# Patient Record
Sex: MALE | Race: WHITE | NOT HISPANIC OR LATINO | Employment: OTHER | URBAN - METROPOLITAN AREA
[De-identification: names, ages, dates, MRNs, and addresses within clinical notes are randomized per-mention and may not be internally consistent; named-entity substitution may affect disease eponyms.]

---

## 2020-08-17 ENCOUNTER — NURSING HOME VISIT (OUTPATIENT)
Dept: FAMILY MEDICINE CLINIC | Facility: CLINIC | Age: 70
End: 2020-08-17
Payer: COMMERCIAL

## 2020-08-17 VITALS
TEMPERATURE: 98.2 F | OXYGEN SATURATION: 90 % | WEIGHT: 136.9 LBS | SYSTOLIC BLOOD PRESSURE: 122 MMHG | HEART RATE: 67 BPM | RESPIRATION RATE: 16 BRPM | DIASTOLIC BLOOD PRESSURE: 80 MMHG

## 2020-08-17 DIAGNOSIS — I63.9 CEREBROVASCULAR ACCIDENT (CVA), UNSPECIFIED MECHANISM (HCC): ICD-10-CM

## 2020-08-17 DIAGNOSIS — I10 ESSENTIAL HYPERTENSION: ICD-10-CM

## 2020-08-17 DIAGNOSIS — G47.09 OTHER INSOMNIA: ICD-10-CM

## 2020-08-17 DIAGNOSIS — E78.49 OTHER HYPERLIPIDEMIA: Primary | ICD-10-CM

## 2020-08-17 PROCEDURE — 99305 1ST NF CARE MODERATE MDM 35: CPT | Performed by: INTERNAL MEDICINE

## 2020-08-17 NOTE — ASSESSMENT & PLAN NOTE
Patient was initially hospitalized on 07/21 with confusion  He was noted to have left-sided neglect on physical exam   MRI/MRA of the head and neck revealed acute to early subacute infarction involving the right posterior cerebral artery territory along with multiple old lacunar infarction and microvascular ischemic changes  Patient was evaluated by neurology service    Continue aspirin and atorvastatin
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
## 2020-08-17 NOTE — ASSESSMENT & PLAN NOTE
Continue trazodone and melatonin    Patient was previously on Remeron and Ambien which were discontinued during recent hospitalization

## 2020-08-17 NOTE — ASSESSMENT & PLAN NOTE
Blood pressure remains stable of antihypertensives    Patient was previously on lisinopril/ HCTZ which was discontinued at the hospital

## 2020-08-21 PROBLEM — I63.9 CVA (CEREBRAL VASCULAR ACCIDENT) (HCC): Status: RESOLVED | Noted: 2020-08-17 | Resolved: 2020-08-21

## 2020-08-21 PROBLEM — Z29.9 DVT PROPHYLAXIS: Status: ACTIVE | Noted: 2020-08-21

## 2020-08-21 PROBLEM — K59.01 SLOW TRANSIT CONSTIPATION: Status: ACTIVE | Noted: 2020-08-21

## 2020-08-21 PROBLEM — G62.9 PERIPHERAL NEUROPATHY: Status: ACTIVE | Noted: 2020-08-21

## 2020-12-10 ENCOUNTER — APPOINTMENT (EMERGENCY)
Dept: RADIOLOGY | Facility: HOSPITAL | Age: 70
DRG: 177 | End: 2020-12-10
Payer: COMMERCIAL

## 2020-12-10 ENCOUNTER — HOSPITAL ENCOUNTER (INPATIENT)
Facility: HOSPITAL | Age: 70
LOS: 21 days | Discharge: NURSING, REHAB OR LONG-TERM CARE | DRG: 177 | End: 2020-12-31
Attending: EMERGENCY MEDICINE | Admitting: ANESTHESIOLOGY
Payer: COMMERCIAL

## 2020-12-10 DIAGNOSIS — J96.01 ACUTE RESPIRATORY FAILURE WITH HYPOXIA (HCC): ICD-10-CM

## 2020-12-10 DIAGNOSIS — G93.41 ACUTE METABOLIC ENCEPHALOPATHY: ICD-10-CM

## 2020-12-10 DIAGNOSIS — J44.9 CHRONIC OBSTRUCTIVE PULMONARY DISEASE, UNSPECIFIED COPD TYPE (HCC): ICD-10-CM

## 2020-12-10 DIAGNOSIS — U07.1 PNEUMONIA DUE TO COVID-19 VIRUS: ICD-10-CM

## 2020-12-10 DIAGNOSIS — U07.1 COVID-19 VIRUS INFECTION: ICD-10-CM

## 2020-12-10 DIAGNOSIS — I63.9 CEREBROVASCULAR ACCIDENT (CVA), UNSPECIFIED MECHANISM (HCC): ICD-10-CM

## 2020-12-10 DIAGNOSIS — A41.9 SEPSIS, DUE TO UNSPECIFIED ORGANISM, UNSPECIFIED WHETHER ACUTE ORGAN DYSFUNCTION PRESENT (HCC): Primary | ICD-10-CM

## 2020-12-10 DIAGNOSIS — R09.02 HYPOXIA: ICD-10-CM

## 2020-12-10 DIAGNOSIS — J12.82 PNEUMONIA DUE TO COVID-19 VIRUS: ICD-10-CM

## 2020-12-10 DIAGNOSIS — I48.91 NEW ONSET ATRIAL FIBRILLATION (HCC): ICD-10-CM

## 2020-12-10 DIAGNOSIS — S06.5X9A SUBDURAL HEMATOMA (HCC): ICD-10-CM

## 2020-12-10 LAB
ABO GROUP BLD: NORMAL
ALBUMIN SERPL BCP-MCNC: 2.5 G/DL (ref 3.5–5)
ALP SERPL-CCNC: 77 U/L (ref 46–116)
ALT SERPL W P-5'-P-CCNC: 27 U/L (ref 12–78)
ANION GAP SERPL CALCULATED.3IONS-SCNC: 9 MMOL/L (ref 4–13)
APTT PPP: 33 SECONDS (ref 23–37)
AST SERPL W P-5'-P-CCNC: 58 U/L (ref 5–45)
ATRIAL RATE: 86 BPM
BASOPHILS # BLD AUTO: 0 THOUSANDS/ΜL (ref 0–0.1)
BASOPHILS NFR BLD AUTO: 0 % (ref 0–1)
BILIRUB SERPL-MCNC: 0.5 MG/DL (ref 0.2–1)
BUN SERPL-MCNC: 41 MG/DL (ref 5–25)
CALCIUM ALBUM COR SERPL-MCNC: 9.2 MG/DL (ref 8.3–10.1)
CALCIUM SERPL-MCNC: 8 MG/DL (ref 8.3–10.1)
CHLORIDE SERPL-SCNC: 105 MMOL/L (ref 100–108)
CO2 SERPL-SCNC: 22 MMOL/L (ref 21–32)
CREAT SERPL-MCNC: 1.41 MG/DL (ref 0.6–1.3)
CRP SERPL QL: 229.7 MG/L
D DIMER PPP FEU-MCNC: 1.99 UG/ML FEU
EOSINOPHIL # BLD AUTO: 0 THOUSAND/ΜL (ref 0–0.61)
EOSINOPHIL NFR BLD AUTO: 0 % (ref 0–6)
ERYTHROCYTE [DISTWIDTH] IN BLOOD BY AUTOMATED COUNT: 16.3 % (ref 11.6–15.1)
FERRITIN SERPL-MCNC: 1801 NG/ML (ref 8–388)
FLUAV RNA RESP QL NAA+PROBE: NEGATIVE
FLUBV RNA RESP QL NAA+PROBE: NEGATIVE
GFR SERPL CREATININE-BSD FRML MDRD: 49 ML/MIN/1.73SQ M
GLUCOSE SERPL-MCNC: 174 MG/DL (ref 65–140)
HCT VFR BLD AUTO: 30.2 % (ref 36.5–49.3)
HGB BLD-MCNC: 10.3 G/DL (ref 12–17)
IMM GRANULOCYTES # BLD AUTO: 0.02 THOUSAND/UL (ref 0–0.2)
IMM GRANULOCYTES NFR BLD AUTO: 1 % (ref 0–2)
INR PPP: 0.98 (ref 0.84–1.19)
LACTATE SERPL-SCNC: 1.6 MMOL/L (ref 0.5–2)
LYMPHOCYTES # BLD AUTO: 0.42 THOUSANDS/ΜL (ref 0.6–4.47)
LYMPHOCYTES NFR BLD AUTO: 14 % (ref 14–44)
MAGNESIUM SERPL-MCNC: 2.1 MG/DL (ref 1.6–2.6)
MCH RBC QN AUTO: 30.8 PG (ref 26.8–34.3)
MCHC RBC AUTO-ENTMCNC: 34.1 G/DL (ref 31.4–37.4)
MCV RBC AUTO: 90 FL (ref 82–98)
MONOCYTES # BLD AUTO: 0.23 THOUSAND/ΜL (ref 0.17–1.22)
MONOCYTES NFR BLD AUTO: 8 % (ref 4–12)
NEUTROPHILS # BLD AUTO: 2.37 THOUSANDS/ΜL (ref 1.85–7.62)
NEUTS SEG NFR BLD AUTO: 77 % (ref 43–75)
NRBC BLD AUTO-RTO: 0 /100 WBCS
NT-PROBNP SERPL-MCNC: 442 PG/ML
P AXIS: 38 DEGREES
PLATELET # BLD AUTO: 131 THOUSANDS/UL (ref 149–390)
PMV BLD AUTO: 10 FL (ref 8.9–12.7)
POTASSIUM SERPL-SCNC: 4.3 MMOL/L (ref 3.5–5.3)
PR INTERVAL: 142 MS
PROCALCITONIN SERPL-MCNC: 0.05 NG/ML
PROCALCITONIN SERPL-MCNC: 0.18 NG/ML
PROT SERPL-MCNC: 6.7 G/DL (ref 6.4–8.2)
PROTHROMBIN TIME: 13.2 SECONDS (ref 11.6–14.5)
QRS AXIS: 22 DEGREES
QRSD INTERVAL: 76 MS
QT INTERVAL: 392 MS
QTC INTERVAL: 469 MS
RBC # BLD AUTO: 3.34 MILLION/UL (ref 3.88–5.62)
RH BLD: POSITIVE
RSV RNA RESP QL NAA+PROBE: NEGATIVE
SARS-COV-2 RNA RESP QL NAA+PROBE: POSITIVE
SODIUM SERPL-SCNC: 136 MMOL/L (ref 136–145)
T WAVE AXIS: 29 DEGREES
TROPONIN I SERPL-MCNC: 0.02 NG/ML
VENTRICULAR RATE: 86 BPM
WBC # BLD AUTO: 3.04 THOUSAND/UL (ref 4.31–10.16)

## 2020-12-10 PROCEDURE — 83735 ASSAY OF MAGNESIUM: CPT | Performed by: EMERGENCY MEDICINE

## 2020-12-10 PROCEDURE — 84484 ASSAY OF TROPONIN QUANT: CPT | Performed by: EMERGENCY MEDICINE

## 2020-12-10 PROCEDURE — 85379 FIBRIN DEGRADATION QUANT: CPT | Performed by: EMERGENCY MEDICINE

## 2020-12-10 PROCEDURE — 80053 COMPREHEN METABOLIC PANEL: CPT | Performed by: EMERGENCY MEDICINE

## 2020-12-10 PROCEDURE — 96374 THER/PROPH/DIAG INJ IV PUSH: CPT

## 2020-12-10 PROCEDURE — 85610 PROTHROMBIN TIME: CPT | Performed by: EMERGENCY MEDICINE

## 2020-12-10 PROCEDURE — 87040 BLOOD CULTURE FOR BACTERIA: CPT | Performed by: EMERGENCY MEDICINE

## 2020-12-10 PROCEDURE — 86901 BLOOD TYPING SEROLOGIC RH(D): CPT | Performed by: INTERNAL MEDICINE

## 2020-12-10 PROCEDURE — 85025 COMPLETE CBC W/AUTO DIFF WBC: CPT | Performed by: EMERGENCY MEDICINE

## 2020-12-10 PROCEDURE — 83605 ASSAY OF LACTIC ACID: CPT | Performed by: EMERGENCY MEDICINE

## 2020-12-10 PROCEDURE — 85730 THROMBOPLASTIN TIME PARTIAL: CPT | Performed by: EMERGENCY MEDICINE

## 2020-12-10 PROCEDURE — 99223 1ST HOSP IP/OBS HIGH 75: CPT | Performed by: INTERNAL MEDICINE

## 2020-12-10 PROCEDURE — 86140 C-REACTIVE PROTEIN: CPT | Performed by: INTERNAL MEDICINE

## 2020-12-10 PROCEDURE — 99285 EMERGENCY DEPT VISIT HI MDM: CPT

## 2020-12-10 PROCEDURE — 84145 PROCALCITONIN (PCT): CPT | Performed by: INTERNAL MEDICINE

## 2020-12-10 PROCEDURE — 99285 EMERGENCY DEPT VISIT HI MDM: CPT | Performed by: EMERGENCY MEDICINE

## 2020-12-10 PROCEDURE — 93005 ELECTROCARDIOGRAM TRACING: CPT

## 2020-12-10 PROCEDURE — 84145 PROCALCITONIN (PCT): CPT | Performed by: EMERGENCY MEDICINE

## 2020-12-10 PROCEDURE — 83520 IMMUNOASSAY QUANT NOS NONAB: CPT | Performed by: INTERNAL MEDICINE

## 2020-12-10 PROCEDURE — 71045 X-RAY EXAM CHEST 1 VIEW: CPT

## 2020-12-10 PROCEDURE — 86900 BLOOD TYPING SEROLOGIC ABO: CPT | Performed by: INTERNAL MEDICINE

## 2020-12-10 PROCEDURE — 0241U HB NFCT DS VIR RESP RNA 4 TRGT: CPT | Performed by: EMERGENCY MEDICINE

## 2020-12-10 PROCEDURE — XW033E5 INTRODUCTION OF REMDESIVIR ANTI-INFECTIVE INTO PERIPHERAL VEIN, PERCUTANEOUS APPROACH, NEW TECHNOLOGY GROUP 5: ICD-10-PCS | Performed by: INTERNAL MEDICINE

## 2020-12-10 PROCEDURE — 93010 ELECTROCARDIOGRAM REPORT: CPT | Performed by: INTERNAL MEDICINE

## 2020-12-10 PROCEDURE — 83880 ASSAY OF NATRIURETIC PEPTIDE: CPT | Performed by: EMERGENCY MEDICINE

## 2020-12-10 PROCEDURE — 96360 HYDRATION IV INFUSION INIT: CPT

## 2020-12-10 PROCEDURE — 82728 ASSAY OF FERRITIN: CPT | Performed by: INTERNAL MEDICINE

## 2020-12-10 PROCEDURE — 36415 COLL VENOUS BLD VENIPUNCTURE: CPT | Performed by: EMERGENCY MEDICINE

## 2020-12-10 RX ORDER — DOXYCYCLINE HYCLATE 100 MG/1
100 CAPSULE ORAL EVERY 12 HOURS SCHEDULED
Status: DISCONTINUED | OUTPATIENT
Start: 2020-12-10 | End: 2020-12-11

## 2020-12-10 RX ORDER — FAMOTIDINE 20 MG/1
20 TABLET, FILM COATED ORAL DAILY
Status: DISCONTINUED | OUTPATIENT
Start: 2020-12-10 | End: 2020-12-22

## 2020-12-10 RX ORDER — HEPARIN SODIUM 5000 [USP'U]/ML
5000 INJECTION, SOLUTION INTRAVENOUS; SUBCUTANEOUS EVERY 8 HOURS SCHEDULED
Status: DISCONTINUED | OUTPATIENT
Start: 2020-12-10 | End: 2020-12-15

## 2020-12-10 RX ORDER — FAMOTIDINE 20 MG/1
20 TABLET, FILM COATED ORAL 2 TIMES DAILY
Status: DISCONTINUED | OUTPATIENT
Start: 2020-12-10 | End: 2020-12-10

## 2020-12-10 RX ORDER — MELATONIN
2000 DAILY
Status: DISCONTINUED | OUTPATIENT
Start: 2020-12-11 | End: 2020-12-22

## 2020-12-10 RX ORDER — ATORVASTATIN CALCIUM 40 MG/1
40 TABLET, FILM COATED ORAL
Status: DISCONTINUED | OUTPATIENT
Start: 2020-12-10 | End: 2020-12-22

## 2020-12-10 RX ORDER — DEXAMETHASONE SODIUM PHOSPHATE 4 MG/ML
6 INJECTION, SOLUTION INTRA-ARTICULAR; INTRALESIONAL; INTRAMUSCULAR; INTRAVENOUS; SOFT TISSUE EVERY 24 HOURS
Status: COMPLETED | OUTPATIENT
Start: 2020-12-10 | End: 2020-12-19

## 2020-12-10 RX ORDER — MULTIVITAMIN/IRON/FOLIC ACID 18MG-0.4MG
1 TABLET ORAL DAILY
Status: DISCONTINUED | OUTPATIENT
Start: 2020-12-18 | End: 2020-12-22

## 2020-12-10 RX ORDER — VANCOMYCIN HYDROCHLORIDE 1 G/200ML
15 INJECTION, SOLUTION INTRAVENOUS ONCE
Status: COMPLETED | OUTPATIENT
Start: 2020-12-10 | End: 2020-12-10

## 2020-12-10 RX ORDER — ZINC SULFATE 50(220)MG
220 CAPSULE ORAL DAILY
Status: COMPLETED | OUTPATIENT
Start: 2020-12-11 | End: 2020-12-17

## 2020-12-10 RX ORDER — LANOLIN ALCOHOL/MO/W.PET/CERES
3 CREAM (GRAM) TOPICAL
Status: DISCONTINUED | OUTPATIENT
Start: 2020-12-10 | End: 2020-12-22

## 2020-12-10 RX ORDER — ACETAMINOPHEN 325 MG/1
650 TABLET ORAL EVERY 6 HOURS PRN
Status: DISCONTINUED | OUTPATIENT
Start: 2020-12-10 | End: 2020-12-10 | Stop reason: SDUPTHER

## 2020-12-10 RX ORDER — ONDANSETRON 2 MG/ML
4 INJECTION INTRAMUSCULAR; INTRAVENOUS EVERY 6 HOURS PRN
Status: DISCONTINUED | OUTPATIENT
Start: 2020-12-10 | End: 2020-12-24

## 2020-12-10 RX ORDER — SODIUM CHLORIDE, SODIUM LACTATE, POTASSIUM CHLORIDE, CALCIUM CHLORIDE 600; 310; 30; 20 MG/100ML; MG/100ML; MG/100ML; MG/100ML
75 INJECTION, SOLUTION INTRAVENOUS CONTINUOUS
Status: DISCONTINUED | OUTPATIENT
Start: 2020-12-10 | End: 2020-12-11

## 2020-12-10 RX ORDER — ASCORBIC ACID 500 MG
1000 TABLET ORAL EVERY 12 HOURS SCHEDULED
Status: COMPLETED | OUTPATIENT
Start: 2020-12-10 | End: 2020-12-17

## 2020-12-10 RX ORDER — BISACODYL 10 MG
10 SUPPOSITORY, RECTAL RECTAL DAILY
Status: DISCONTINUED | OUTPATIENT
Start: 2020-12-11 | End: 2020-12-22

## 2020-12-10 RX ORDER — ACETAMINOPHEN 325 MG/1
650 TABLET ORAL EVERY 6 HOURS PRN
Status: DISCONTINUED | OUTPATIENT
Start: 2020-12-10 | End: 2020-12-22

## 2020-12-10 RX ORDER — NICOTINE 21 MG/24HR
1 PATCH, TRANSDERMAL 24 HOURS TRANSDERMAL DAILY
Status: DISCONTINUED | OUTPATIENT
Start: 2020-12-11 | End: 2020-12-20

## 2020-12-10 RX ORDER — GABAPENTIN 300 MG/1
600 CAPSULE ORAL 3 TIMES DAILY
Status: DISCONTINUED | OUTPATIENT
Start: 2020-12-10 | End: 2020-12-22

## 2020-12-10 RX ORDER — TRAZODONE HYDROCHLORIDE 50 MG/1
25 TABLET ORAL
Status: DISCONTINUED | OUTPATIENT
Start: 2020-12-10 | End: 2020-12-22

## 2020-12-10 RX ORDER — ASPIRIN 325 MG
325 TABLET ORAL DAILY
Status: DISCONTINUED | OUTPATIENT
Start: 2020-12-11 | End: 2020-12-16

## 2020-12-10 RX ADMIN — SODIUM CHLORIDE 1000 ML: 0.9 INJECTION, SOLUTION INTRAVENOUS at 13:31

## 2020-12-10 RX ADMIN — CEFEPIME HYDROCHLORIDE 2000 MG: 2 INJECTION, POWDER, FOR SOLUTION INTRAVENOUS at 14:05

## 2020-12-10 RX ADMIN — DOXYCYCLINE 100 MG: 100 CAPSULE ORAL at 20:14

## 2020-12-10 RX ADMIN — ATORVASTATIN CALCIUM 40 MG: 40 TABLET, FILM COATED ORAL at 22:50

## 2020-12-10 RX ADMIN — MELATONIN 3 MG: at 22:51

## 2020-12-10 RX ADMIN — VANCOMYCIN HYDROCHLORIDE 1000 MG: 1 INJECTION, SOLUTION INTRAVENOUS at 15:27

## 2020-12-10 RX ADMIN — GABAPENTIN 600 MG: 300 CAPSULE ORAL at 20:15

## 2020-12-10 RX ADMIN — FAMOTIDINE 20 MG: 20 TABLET ORAL at 22:51

## 2020-12-10 RX ADMIN — DEXAMETHASONE SODIUM PHOSPHATE 6 MG: 4 INJECTION, SOLUTION INTRAMUSCULAR; INTRAVENOUS at 20:15

## 2020-12-10 RX ADMIN — SODIUM CHLORIDE, SODIUM LACTATE, POTASSIUM CHLORIDE, AND CALCIUM CHLORIDE 75 ML/HR: .6; .31; .03; .02 INJECTION, SOLUTION INTRAVENOUS at 20:11

## 2020-12-10 RX ADMIN — CEFTRIAXONE SODIUM 1000 MG: 10 INJECTION, POWDER, FOR SOLUTION INTRAVENOUS at 20:16

## 2020-12-10 RX ADMIN — TRAZODONE HYDROCHLORIDE 25 MG: 50 TABLET ORAL at 22:50

## 2020-12-10 RX ADMIN — HEPARIN SODIUM 5000 UNITS: 5000 INJECTION INTRAVENOUS; SUBCUTANEOUS at 22:51

## 2020-12-10 RX ADMIN — REMDESIVIR 200 MG: 100 INJECTION, POWDER, LYOPHILIZED, FOR SOLUTION INTRAVENOUS at 22:45

## 2020-12-10 RX ADMIN — OXYCODONE HYDROCHLORIDE AND ACETAMINOPHEN 1000 MG: 500 TABLET ORAL at 20:14

## 2020-12-10 NOTE — ED NOTES
Patient informed about urine sample collection  Patient unable to use the restroom at this time       Beranrd Landis  12/10/20 5745
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
## 2020-12-10 NOTE — ASSESSMENT & PLAN NOTE
Will admit patient on moderate COVID-19 protocol as he is on 5 L of oxygen satting 92%   IV remdesivir, IV Decadron, multivitamin  IV Rocephin, doxycycline  DVT prophylaxis  Will order all COVID labs and trend     Discussed with patient's niece over the phone obtain consent COVID-19 plasma after explaining it as EUA  Convalescent Plasma was ordered on 12/10/2020  The Fact Sheet for Patients and Parents/Caregivers was provided to the patient and/or healthcare agent  The option to accept or refuse administration was offered  The risks, benefits, and alternatives to treatment were reviewed, and all questions addressed  Disclosure that this treatment is authorized for use under EUA and not FDA approved for treatment was discussed       Will consult pulmonology, recommendations would be greatly appreciated

## 2020-12-10 NOTE — ASSESSMENT & PLAN NOTE
Patient currently on 5 L of oxygen via nasal cannula satting 92%  Continue oxygen supplementation  If patient needs increased oxygen requirement insert med floor and consult ICU

## 2020-12-10 NOTE — H&P
H&P- Syliva Arrow 1950, 65 y o  male MRN: [id number]    Unit/Bed#: ED 21 Encounter: [id number]    Primary Care Provider: Cari Reagan MD   Date and time admitted to hospital: 12/10/2020 12:59 PM    * COVID-19 virus infection  Assessment & Plan  Will admit patient on moderate COVID-19 protocol as he is on 5 L of oxygen satting 92%   IV remdesivir, IV Decadron, multivitamin  IV Rocephin, doxycycline  DVT prophylaxis  Will order all COVID labs and trend     Discussed with patient's niece over the phone obtain consent COVID-19 plasma after explaining it as EUA  Convalescent Plasma was ordered on 12/10/2020  The Fact Sheet for Patients and Parents/Caregivers was provided to the patient and/or healthcare agent  The option to accept or refuse administration was offered  The risks, benefits, and alternatives to treatment were reviewed, and all questions addressed  Disclosure that this treatment is authorized for use under EUA and not FDA approved for treatment was discussed  Will consult pulmonology, recommendations would be greatly appreciated    Acute metabolic encephalopathy  Assessment & Plan  Likely secondary to COVID-19 infection with underlying cognitive dysfunction after recent stroke  Continue to monitor clinically  Serial exams    Acute respiratory failure with hypoxia [facility])  Assessment & Plan  Patient currently on 5 L of oxygen via nasal cannula satting 92%  Continue oxygen supplementation  If patient needs increased oxygen requirement insert med floor and consult ICU    COPD (chronic obstructive pulmonary disease) ([facility])  Assessment & Plan  Albuterol p r n      CVA (cerebral vascular accident) [facility])  Assessment & Plan  Recent history of strokes few months ago mild left upper extremity weakness  Fall precautions    Essential hypertension  Assessment & Plan  Was hypotensive on admission  IV fluid hydration    Continue to monitor    Other hyperlipidemia  Assessment & Plan  Will change statin to atorvastatin 40 mg due to COVID infection      VTE Prophylaxis: Heparin  / sequential compression device   Code Status:  Level 2 as per patient's niece  POLST: POLST form is not discussed and not completed at this time  Discussion with family:  Patient's niece over the phone    Anticipated Length of Stay:  Patient will be admitted on an Inpatient basis with an anticipated length of stay of  2 midnights  Justification for Hospital Stay:     Total Time for Visit, including Counseling / Coordination of Care: 30 minutes  Greater than 50% of this total time spent on direct patient counseling and coordination of care  Chief Complaint:       History of Present Illness:    Jennifer Russell is a 65 y o  male with prior past medical history of hypertension, hyperlipidemia, TIA, Chronic obstructive pulmonary disease who presents with difficulty walking and hypotension  Patient is a poor historian also appears to have had gradual cognitive worsening over the last few weeks after his stroke  Entire history is obtained by talking to ED provider as well as discussing with his niece over the phone  Patient's sister called EMS as she found him very weak tired  Upon EMS arrival he was found to be hypoxic  Review of Systems:    Review of systems limited secondary to patient's mental status change  He denies any chest pain or shortness of breath  Does not answer much questions      Past Medical and Surgical History:     Past Medical History:   Diagnosis Date    Abnormality of gait and mobility     GERD (gastroesophageal reflux disease)     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 Authorizing Provider   acetaminophen (TYLENOL) 325 mg tablet Take 650 mg by mouth every 6 (six) hours as needed for mild pain    Historical Provider, MD   aspirin 325 mg tablet Take 325 mg by mouth daily    Historical Provider, MD   atorvastatin (LIPITOR) 20 mg tablet Take 20 mg by mouth daily    Historical Provider, MD   bisacodyl (Dulcolax) 10 mg suppository Insert 10 mg into the rectum daily    Historical Provider, MD   gabapentin (NEURONTIN) 600 MG tablet Take 600 mg by mouth 3 (three) times a day    Historical Provider, MD   Heparin Sodium, Porcine, (heparin, porcine,) 5,000 units/mL Inject 5,000 Units under the skin every 8 (eight) hours    Historical Provider, MD   magnesium hydroxide (MILK OF MAGNESIA) 400 mg/5 mL oral suspension Take 30 mL by mouth daily as needed for constipation    Historical Provider, MD   melatonin 3 mg Take 3 mg by mouth daily at bedtime    Historical Provider, MD   Polyethylene Glycol 3350 POWD 17 g by Does not apply route daily as needed    Historical Provider, MD   traZODone (DESYREL) 50 mg tablet Take 25 mg by mouth daily at bedtime    Historical Provider, MD     I have reviewed home medications with patient personally  Allergies: No Known Allergies    Social History:     Marital Status: Single   Occupation:   Patient Pre-hospital Living Situation:   Patient Pre-hospital Level of Mobility:   Patient Pre-hospital Diet Restrictions:   Substance Use History:   Social History     Substance and Sexual Activity   Alcohol Use Yes     Social History     Tobacco Use   Smoking Status Current Every Day Smoker     Social History     Substance and Sexual Activity   Drug Use Yes    Comment: pt states "I take whatever I get a hold of"       Family History:    No family history on file      Physical Exam:     Vitals:   Blood Pressure: 114/57 (12/10/20 1500)  Pulse: 77 (12/10/20 1500)  Temperature: 98 6 °F (37 °C) (12/10/20 1309)  Temp Source: Oral (12/10/20 1309)  Respirations: (!) 26 (12/10/20 1500)  SpO2: 92 % (12/10/20 1500)    Physical Exam  Vitals signs and nursing note reviewed  Constitutional:       Appearance: He is ill-appearing  HENT:      Head: Normocephalic and atraumatic  Neck:      Musculoskeletal: Normal range of motion  Cardiovascular:      Rate and Rhythm: Normal rate and regular rhythm  Pulses: Normal pulses  Pulmonary:      Effort: Pulmonary effort is normal  No respiratory distress  Breath sounds: Rales present  Abdominal:      General: Abdomen is flat  Palpations: Abdomen is soft  Neurological:      Mental Status: He is alert  He is disoriented  Comments: Mild left upper extremity weakness         Additional Data:     Lab Results: I have personally reviewed pertinent reports  Results from last 7 days   Lab Units 12/10/20  1318   WBC Thousand/uL 3 04*   HEMOGLOBIN g/dL 10 3*   HEMATOCRIT % 30 2*   PLATELETS Thousands/uL 131*   NEUTROS PCT % 77*   LYMPHS PCT % 14   MONOS PCT % 8   EOS PCT % 0     Results from last 7 days   Lab Units 12/10/20  1318   SODIUM mmol/L 136   POTASSIUM mmol/L 4 3   CHLORIDE mmol/L 105   CO2 mmol/L 22   BUN mg/dL 41*   CREATININE mg/dL 1 41*   ANION GAP mmol/L 9   CALCIUM mg/dL 8 0*   ALBUMIN g/dL 2 5*   TOTAL BILIRUBIN mg/dL 0 50   ALK PHOS U/L 77   ALT U/L 27   AST U/L 58*   GLUCOSE RANDOM mg/dL 174*     Results from last 7 days   Lab Units 12/10/20  1318   INR  0 98             Results from last 7 days   Lab Units 12/10/20  1318   LACTIC ACID mmol/L 1 6       Imaging: I have personally reviewed pertinent reports  EKG, Pathology, and Other Studies Reviewed on Admission:   · EKG:     Allscripts / Epic Records Reviewed: Yes     ** Please Note: This note has been constructed using a voice recognition system   **

## 2020-12-10 NOTE — PLAN OF CARE
Problem: Potential for Falls  Goal: Patient will remain free of falls  Description: INTERVENTIONS:  - Assess patient frequently for physical needs  -  Identify cognitive and physical deficits and behaviors that affect risk of falls    -  [place] fall precautions as indicated by assessment   - Educate patient/family on patient safety including physical limitations  - Instruct patient to call for assistance with activity based on assessment  - Modify environment to reduce risk of injury  - Consider OT/PT consult to assist with strengthening/mobility  Outcome: Not Progressing     Problem: PAIN - ADULT  Goal: Verbalizes/displays adequate comfort level or baseline comfort level  Description: Interventions:  - Encourage patient to monitor pain and request assistance  - Assess pain using appropriate pain scale  - Administer analgesics based on type and severity of pain and evaluate response  - Implement non-pharmacological measures as appropriate and evaluate response  - Consider cultural and social influences on pain and pain management  - Notify physician/advanced practitioner if interventions unsuccessful or patient reports new pain  Outcome: Not Progressing     Problem: INFECTION - ADULT  Goal: Absence or prevention of progression during hospitalization  Description: INTERVENTIONS:  - Assess and monitor for signs and symptoms of infection  - Monitor lab/diagnostic results  - Monitor all insertion sites, i e  indwelling lines, tubes, and drains  - Monitor endotracheal if appropriate and nasal secretions for changes in amount and color  - [place] appropriate cooling/warming therapies per order  - Administer medications as ordered  - Instruct and encourage patient and family to use good hand hygiene technique  - Identify and instruct in appropriate isolation precautions for identified infection/condition  Outcome: Not Progressing  Goal: Absence of fever/infection during neutropenic period  Description: INTERVENTIONS:  - Monitor WBC    Outcome: Not Progressing     Problem: SAFETY ADULT  Goal: Patient will remain free of falls  Description: INTERVENTIONS:  - Assess patient frequently for physical needs  -  Identify cognitive and physical deficits and behaviors that affect risk of falls    -  [place] fall precautions as indicated by assessment   - Educate patient/family on patient safety including physical limitations  - Instruct patient to call for assistance with activity based on assessment  - Modify environment to reduce risk of injury  - Consider OT/PT consult to assist with strengthening/mobility  Outcome: Not Progressing  Goal: Maintain or return to baseline ADL function  Description: INTERVENTIONS:  -  Assess patient's ability to carry out ADLs; assess patient's baseline for ADL function and identify physical deficits which impact ability to perform ADLs (bathing, care of mouth/teeth, toileting, grooming, dressing, etc )  - Assess/evaluate cause of self-care deficits   - Assess range of motion  - Assess patient's mobility; develop plan if impaired  - Assess patient's need for assistive devices and provide as appropriate  - Encourage maximum independence but intervene and supervise when necessary  - Involve family in performance of ADLs  - Assess for home care needs following discharge   - Consider OT consult to assist with ADL evaluation and planning for discharge  - Provide patient education as appropriate  Outcome: Not Progressing  Goal: Maintain or return mobility status to optimal level  Description: INTERVENTIONS:  - Assess patient's baseline mobility status (ambulation, transfers, stairs, etc )    - Identify cognitive and physical deficits and behaviors that affect mobility  - Identify mobility aids required to assist with transfers and/or ambulation (gait belt, sit-to-stand, lift, walker, cane, etc )  - [place] fall precautions as indicated by assessment  - Record patient progress and toleration of activity level on Mobility SBAR; progress patient to next Phase/Stage  - Instruct patient to call for assistance with activity based on assessment  - Consider rehabilitation consult to assist with strengthening/weightbearing, etc   Outcome: Not Progressing     Problem: DISCHARGE PLANNING  Goal: Discharge to home or other facility with appropriate resources  Description: INTERVENTIONS:  - Identify barriers to discharge w/patient and caregiver  - Arrange for needed discharge resources and transportation as appropriate  - Identify discharge learning needs (meds, wound care, etc )  - Arrange for interpretive services to assist at discharge as needed  - Refer to Case Management Department for coordinating discharge planning if the patient needs post-hospital services based on physician/advanced practitioner order or complex needs related to functional status, cognitive ability, or social support system  Outcome: Not Progressing     Problem: Knowledge Deficit  Goal: Patient/family/caregiver demonstrates understanding of disease process, treatment plan, medications, and discharge instructions  Description: Complete learning assessment and assess knowledge base    Interventions:  - Provide teaching at level of understanding  - Provide teaching via preferred learning methods  Outcome: Not Progressing

## 2020-12-11 PROBLEM — N17.9 AKI (ACUTE KIDNEY INJURY) (HCC): Status: ACTIVE | Noted: 2020-12-11

## 2020-12-11 LAB
ABO GROUP BLD: NORMAL
ALBUMIN SERPL BCP-MCNC: 2.3 G/DL (ref 3.5–5)
ALP SERPL-CCNC: 81 U/L (ref 46–116)
ALT SERPL W P-5'-P-CCNC: 27 U/L (ref 12–78)
ANION GAP SERPL CALCULATED.3IONS-SCNC: 9 MMOL/L (ref 4–13)
AST SERPL W P-5'-P-CCNC: 55 U/L (ref 5–45)
BILIRUB SERPL-MCNC: 0.4 MG/DL (ref 0.2–1)
BLD GP AB SCN SERPL QL: NEGATIVE
BUN SERPL-MCNC: 30 MG/DL (ref 5–25)
CALCIUM ALBUM COR SERPL-MCNC: 9.9 MG/DL (ref 8.3–10.1)
CALCIUM SERPL-MCNC: 8.5 MG/DL (ref 8.3–10.1)
CHLORIDE SERPL-SCNC: 107 MMOL/L (ref 100–108)
CO2 SERPL-SCNC: 25 MMOL/L (ref 21–32)
CREAT SERPL-MCNC: 1.17 MG/DL (ref 0.6–1.3)
ERYTHROCYTE [DISTWIDTH] IN BLOOD BY AUTOMATED COUNT: 16.2 % (ref 11.6–15.1)
GFR SERPL CREATININE-BSD FRML MDRD: 62 ML/MIN/1.73SQ M
GLUCOSE SERPL-MCNC: 189 MG/DL (ref 65–140)
HCT VFR BLD AUTO: 33.1 % (ref 36.5–49.3)
HGB BLD-MCNC: 11 G/DL (ref 12–17)
MCH RBC QN AUTO: 30.5 PG (ref 26.8–34.3)
MCHC RBC AUTO-ENTMCNC: 33.2 G/DL (ref 31.4–37.4)
MCV RBC AUTO: 92 FL (ref 82–98)
PLATELET # BLD AUTO: 122 THOUSANDS/UL (ref 149–390)
PMV BLD AUTO: 9.7 FL (ref 8.9–12.7)
POTASSIUM SERPL-SCNC: 4.7 MMOL/L (ref 3.5–5.3)
PROCALCITONIN SERPL-MCNC: 0.05 NG/ML
PROT SERPL-MCNC: 6.6 G/DL (ref 6.4–8.2)
RBC # BLD AUTO: 3.61 MILLION/UL (ref 3.88–5.62)
RH BLD: POSITIVE
SODIUM SERPL-SCNC: 141 MMOL/L (ref 136–145)
SPECIMEN EXPIRATION DATE: NORMAL
WBC # BLD AUTO: 2.73 THOUSAND/UL (ref 4.31–10.16)

## 2020-12-11 PROCEDURE — 86901 BLOOD TYPING SEROLOGIC RH(D): CPT | Performed by: INTERNAL MEDICINE

## 2020-12-11 PROCEDURE — XW13325 TRANSFUSION OF CONVALESCENT PLASMA (NONAUTOLOGOUS) INTO PERIPHERAL VEIN, PERCUTANEOUS APPROACH, NEW TECHNOLOGY GROUP 5: ICD-10-PCS | Performed by: INTERNAL MEDICINE

## 2020-12-11 PROCEDURE — 86900 BLOOD TYPING SEROLOGIC ABO: CPT | Performed by: INTERNAL MEDICINE

## 2020-12-11 PROCEDURE — 80053 COMPREHEN METABOLIC PANEL: CPT | Performed by: INTERNAL MEDICINE

## 2020-12-11 PROCEDURE — 86850 RBC ANTIBODY SCREEN: CPT | Performed by: INTERNAL MEDICINE

## 2020-12-11 PROCEDURE — 85027 COMPLETE CBC AUTOMATED: CPT | Performed by: INTERNAL MEDICINE

## 2020-12-11 PROCEDURE — 99232 SBSQ HOSP IP/OBS MODERATE 35: CPT | Performed by: NURSE PRACTITIONER

## 2020-12-11 PROCEDURE — 84145 PROCALCITONIN (PCT): CPT | Performed by: EMERGENCY MEDICINE

## 2020-12-11 PROCEDURE — 99222 1ST HOSP IP/OBS MODERATE 55: CPT | Performed by: INTERNAL MEDICINE

## 2020-12-11 RX ADMIN — OXYCODONE HYDROCHLORIDE AND ACETAMINOPHEN 1000 MG: 500 TABLET ORAL at 20:49

## 2020-12-11 RX ADMIN — FAMOTIDINE 20 MG: 20 TABLET ORAL at 10:00

## 2020-12-11 RX ADMIN — GABAPENTIN 600 MG: 300 CAPSULE ORAL at 10:00

## 2020-12-11 RX ADMIN — ATORVASTATIN CALCIUM 40 MG: 40 TABLET, FILM COATED ORAL at 23:36

## 2020-12-11 RX ADMIN — HEPARIN SODIUM 5000 UNITS: 5000 INJECTION INTRAVENOUS; SUBCUTANEOUS at 06:15

## 2020-12-11 RX ADMIN — Medication 1 PATCH: at 10:00

## 2020-12-11 RX ADMIN — ZINC SULFATE 220 MG (50 MG) CAPSULE 220 MG: CAPSULE at 10:00

## 2020-12-11 RX ADMIN — BISACODYL 10 MG: 10 SUPPOSITORY RECTAL at 10:00

## 2020-12-11 RX ADMIN — ACETAMINOPHEN 650 MG: 325 TABLET, FILM COATED ORAL at 00:00

## 2020-12-11 RX ADMIN — HEPARIN SODIUM 5000 UNITS: 5000 INJECTION INTRAVENOUS; SUBCUTANEOUS at 12:50

## 2020-12-11 RX ADMIN — DEXAMETHASONE SODIUM PHOSPHATE 6 MG: 4 INJECTION, SOLUTION INTRAMUSCULAR; INTRAVENOUS at 20:49

## 2020-12-11 RX ADMIN — GABAPENTIN 600 MG: 300 CAPSULE ORAL at 18:31

## 2020-12-11 RX ADMIN — OXYCODONE HYDROCHLORIDE AND ACETAMINOPHEN 1000 MG: 500 TABLET ORAL at 10:00

## 2020-12-11 RX ADMIN — ACETAMINOPHEN 650 MG: 325 TABLET, FILM COATED ORAL at 23:35

## 2020-12-11 RX ADMIN — ASPIRIN 325 MG ORAL TABLET 325 MG: 325 PILL ORAL at 10:00

## 2020-12-11 RX ADMIN — TRAZODONE HYDROCHLORIDE 25 MG: 50 TABLET ORAL at 23:36

## 2020-12-11 RX ADMIN — MELATONIN 3 MG: at 23:35

## 2020-12-11 RX ADMIN — HEPARIN SODIUM 5000 UNITS: 5000 INJECTION INTRAVENOUS; SUBCUTANEOUS at 23:34

## 2020-12-11 RX ADMIN — Medication 2000 UNITS: at 10:00

## 2020-12-11 RX ADMIN — DOXYCYCLINE 100 MG: 100 CAPSULE ORAL at 10:00

## 2020-12-11 RX ADMIN — REMDESIVIR 100 MG: 100 INJECTION, POWDER, LYOPHILIZED, FOR SOLUTION INTRAVENOUS at 20:42

## 2020-12-11 NOTE — NURSING NOTE
Pt is currently receiving plasma unable to do documentation in EPIC as per blood bank there is no code as yet for this unit so I was unable to scan same, however the downtime paper work has been completed    Silvio Espino RN  7:08 AM  12/11/20

## 2020-12-11 NOTE — PROGRESS NOTES
Progress Note - Georgia Rinaldi 1950, 65 y o  male MRN: [id number]    Unit/Bed#: -01 Encounter: [id number]    Primary Care Provider: Leo Gautam MD   Date and time admitted to hospital: 12/10/2020 12:59 PM    * COVID-19 virus infection  Assessment & Plan  Will admit patient on moderate COVID-19 protocol as he is on 4-5 L of oxygen satting 92%   IV remdesivir, IV Decadron, multivitamin  Procalcitonin negative x2, IV antibiotics discontinued  DVT prophylaxis  COVID labs are elevated, continue to trend  Pulmonary consulted    Previous provider Discussed with patient's niece over the phone obtain consent COVID-19 plasma after explaining it as EUA  Convalescent Plasma was ordered on 12/11/2020  The Fact Sheet for Patients and Parents/Caregivers was provided to the patient and/or healthcare agent  The option to accept or refuse administration was offered  The risks, benefits, and alternatives to treatment were reviewed, and all questions addressed  Disclosure that this treatment is authorized for use under EUA and not FDA approved for treatment was discussed  Acute respiratory failure with hypoxia (HCC)  Assessment & Plan  Patient increased to 6 L of oxygen via nasal cannula for saturation 93-95%  Continue oxygen supplementation  If patient needs increased oxygen requirement insert med floor and consult ICU    Acute metabolic encephalopathy  Assessment & Plan  Likely secondary to COVID-19 infection  Continue to monitor clinically  Serial exams    JOSHUA (acute kidney injury) ([facility] [address] )  Assessment & Plan  Creatinine was 1 41 on admission improved to 1 7 with IV fluids    COPD (chronic obstructive pulmonary disease) ([facility])  Assessment & Plan  Albuterol p r n      CVA (cerebral vascular accident) [facility])  Assessment & Plan  Recent history of strokes few months ago mild right upper extremity weakness  Fall precautions    Essential hypertension  Assessment & Plan  With hypotensive  IV fluid hydration  Continue to monitor    Other hyperlipidemia  Assessment & Plan  Will change statin to atorvastatin 40 mg due to COVID infection     VTE Pharmacologic Prophylaxis:   Pharmacologic: Heparin  Mechanical VTE Prophylaxis in Place: Yes    Patient Centered Rounds: I have performed bedside rounds with nursing staff today  Discussions with Specialists or Other Care Team Provider:  Reviewed previous provider's notes discussed with case management and primary RN    Education and Discussions with Family / Patient:  Discussed plan of care with patient denies any additional questions or concerns at time    Time Spent for Care: 20 minutes  More than 50% of total time spent on counseling and coordination of care as described above  Current Length of Stay: 1 day(s)    Current Patient Status: Inpatient   Certification Statement: The patient will continue to require additional inpatient hospital stay due to Increased oxygen requirements, IV remdesivir, IV steroids, supportive care    Discharge Plan / Estimated Discharge Date:  Greater than 72 hours      Code Status: Level 2 - DNAR: but accepts endotracheal intubation      Subjective:   Reports that he feels okay, reports pretty severe fatigue  Went over a value and importance of proning  Encouraged ambulation in out of bed into the chair  Patient has significant decreased mobility secondary to a left arm injury  Encouraged incentive spirometer  Denies any chest pain or chest tightness does remark mild shortness of breath and dyspnea on exertion    Objective:     Vitals:   Temp (24hrs), Av 1 °F (36 7 °C), Min:97 3 °F (36 3 °C), Max:99 2 °F (37 3 °C)    Temp:  [97 3 °F (36 3 °C)-99 2 °F (37 3 °C)] 98 3 °F (36 8 °C)  HR:  [58-84] 79  Resp:  [17-26] 20  BP: (103-136)/(57-78) 105/65  SpO2:  [85 %-95 %] 91 %  Body mass index is 23 5 kg/m²  Input and Output Summary (last 24 hours):        Intake/Output Summary (Last 24 hours) at 2020 1448  Last data filed at 2020 0601  Gross per 24 hour   Intake 50 ml   Output 400 ml   Net -350 ml       Physical Exam:     Physical Exam  Constitutional:       General: He is not in acute distress  Appearance: He is cachectic  He is ill-appearing  HENT:      Head: Normocephalic  Neck:      Musculoskeletal: Normal range of motion  Cardiovascular:      Rate and Rhythm: Normal rate  Pulmonary:      Effort: Pulmonary effort is normal  No respiratory distress  Abdominal:      General: Abdomen is flat  Musculoskeletal: Normal range of motion  Skin:     General: Skin is warm  Neurological:      General: No focal deficit present  Mental Status: He is alert  Mental status is at baseline  Psychiatric:         Mood and Affect: Mood normal          Thought Content: Thought content normal          Judgment: Judgment normal        Additional Data:     Labs:    Results from last 7 days   Lab Units 12/11/20  0456 12/10/20  1318   WBC Thousand/uL 2 73* 3 04*   HEMOGLOBIN g/dL 11 0* 10 3*   HEMATOCRIT % 33 1* 30 2*   PLATELETS Thousands/uL 122* 131*   NEUTROS PCT %  --  77*   LYMPHS PCT %  --  14   MONOS PCT %  --  8   EOS PCT %  --  0     Results from last 7 days   Lab Units 12/11/20  0456   POTASSIUM mmol/L 4 7   CHLORIDE mmol/L 107   CO2 mmol/L 25   BUN mg/dL 30*   CREATININE mg/dL 1 17   CALCIUM mg/dL 8 5   ALK PHOS U/L 81   ALT U/L 27   AST U/L 55*     Results from last 7 days   Lab Units 12/10/20  1318   INR  0 98       * I Have Reviewed All Lab Data Listed Above  * Additional Pertinent Lab Tests Reviewed: [facility]    Recent Cultures (last 7 days):     Results from last 7 days   Lab Units 12/10/20  1318   BLOOD CULTURE  Received in Microbiology Lab  Culture in Progress  Received in Microbiology Lab  Culture in Progress         Last 24 Hours Medication List:   Current Facility-Administered Medications   Medication Dose Route Frequency Provider Last Rate    acetaminophen  650 mg Oral Q6H PRFEROZ Roberts, MD      ascorbic acid  1,000 mg Oral Q12H Marcellus Heimlich, MD      aspirin  325 mg Oral Daily Brisa Roberts, MD      atorvastatin  40 mg Oral HS Brisa Roberts, MD      bisacodyl  10 mg Rectal Daily Brisa Roberts MD      cholecalciferol  2,000 Units Oral Daily Brisa Roberts MD      dexamethasone  6 mg Intravenous Q24H Brisa Roberts MD      famotidine  20 mg Oral Daily Brisa Roberts, MD      gabapentin  600 mg Oral TID Brisa Roberts MD      heparin (porcine)  5,000 Units Subcutaneous Q8H Marcellus Heimlich, MD      lactated ringers  75 mL/hr Intravenous Continuous Brisa Roberts MD Stopped (12/11/20 0446)    magnesium hydroxide  30 mL Oral Daily PRN Brisa Roberts MD      melatonin  3 mg Oral HS Brisa Roberts MD      zinc sulfate  220 mg Oral Daily Brisa Roberts MD      Followed by   Terrie Jose ON 12/18/2020] multivitamin-minerals  1 tablet Oral Daily Brisa Roberts MD      nicotine  1 patch Transdermal Daily Brisa Roberts MD      ondansetron  4 mg Intravenous Q6H PRN Brisa Roberts MD      remdesivir  100 mg Intravenous Q24H Brisa Roberts MD      traZODone  25 mg Oral HS Brisa Roberts MD          Today, Patient Was Seen By: ZULMA Last    ** Please Note: Dragon 360 Dictation voice to text software may have been used in the creation of this document   **

## 2020-12-11 NOTE — ASSESSMENT & PLAN NOTE
Will admit patient on moderate COVID-19 protocol as he is on 4-5 L of oxygen satting 92%   IV remdesivir, IV Decadron, multivitamin  Procalcitonin negative x2, IV antibiotics discontinued  DVT prophylaxis  COVID labs are elevated, continue to trend  Pulmonary consulted    Previous provider Discussed with patient's niece over the phone obtain consent COVID-19 plasma after explaining it as EUA  Convalescent Plasma was ordered on 12/11/2020  The Fact Sheet for Patients and Parents/Caregivers was provided to the patient and/or healthcare agent  The option to accept or refuse administration was offered  The risks, benefits, and alternatives to treatment were reviewed, and all questions addressed  Disclosure that this treatment is authorized for use under EUA and not FDA approved for treatment was discussed

## 2020-12-11 NOTE — ASSESSMENT & PLAN NOTE
Patient increased to 6 L of oxygen via nasal cannula for saturation 93-95%  Continue oxygen supplementation  If patient needs increased oxygen requirement insert med floor and consult ICU

## 2020-12-11 NOTE — UTILIZATION REVIEW
Notification of Inpatient Admission/Inpatient Authorization Request   This is a Notification of Inpatient Admission for [address]  Be advised that this patient was admitted to our facility under Inpatient Status  Contact Dean Stanford at [phone] for additional admission information  [facility] DEPT DEDICATED [employer] Cover [phone]  Patient Name:   Kobi Calvin   YOB: 1950       [address]   [address]:   [address]   Tax ID: [id number]  NPI: [id number] Attending Provider/NPI:  Phone:  Address: Jeffry Poe [[id number]]  [phone]  Same as SHANTE/Emmie Baird 1106 of Service Code: 24     [facility]   Start Date: 12/10/20 1418 Discharge Date & Time: No discharge date for patient encounter  Type of Admission: Inpatient Status Discharge Disposition   (if discharged): Final discharge disposition not confirmed   Patient Diagnoses: Difficulty walking [R26 2]  Hypoxia [R09 02]  Sepsis, due to unspecified organism, unspecified whether acute organ dysfunction present ([facility] [address] ) [A41 9]  Pneumonia due to COVID-19 virus [U07 1, J12 89]     Orders: Admission Orders (From admission, onward)     Ordered        12/10/20 1418  Inpatient Admission  Once                    Assigned Utilization Review Contact: Dean Stanford  Utilization   Network Utilization Review Department  Phone: [phone]; Fax [phone]  Email: [email or web address]   ATTENTION PAYERS: Please call the assigned Utilization  directly with any questions or concerns ALL voicemails in the department are confidential  Send all requests for admission clinical reviews, approved or denied determinations and any other requests to dedicated fax number belonging to the campus where the patient is receiving treatment

## 2020-12-11 NOTE — UTILIZATION REVIEW
Initial Clinical Review    Admission: Date/Time/Statement:   Admission Orders (From admission, onward)     Ordered        12/10/20 1418  Inpatient Admission  Once                   Orders Placed This Encounter   Procedures    Inpatient Admission     Standing Status:   Standing     Number of Occurrences:   1     Order Specific Question:   Admitting Physician     Answer:   Ellis Cramer [[id number]]     Order Specific Question:   Level of Care     Answer:   Med Surg [[id number]]     Order Specific Question:   Estimated length of stay     Answer:   More than 2 Midnights     Order Specific Question:   Certification     Answer:   I certify that inpatient services are medically necessary for this patient for a duration of greater than two midnights  See H&P and MD Progress Notes for additional information about the patient's course of treatment  ED Arrival Information     Expected Arrival Acuity Means of Arrival Escorted By Service Admission Type    - 12/10/2020 12:59 Emergent Ambulance [address] Emergency    Arrival Complaint    -        Chief Complaint   Patient presents with    Difficulty Walking     Pt arrives via EMS, sister called EMS because pt was not able to walk, BP 90/50, was recently in a rehab for stroke   Flu Symptoms     Assessment/Plan: 65year old male to the ED from home via EMS with complaints of difficulty walking  Recently discharged from rehab facility and was not able to get out of bed and not eating anything  Admitted to inpatient COVID 19, acute metabolic encephaolopathy, acute respir failure with hypoxia  for  Arrives hypoxic with pulse ox 89%  Placed on [facility]  GCS-15, very poor historian  IVfluid bolus given by EMS with improved BP  CXR shows:  diffuse consolidations, especially the right, concerning for possible pneumonia, could also be COVID-19  COVID +  Rales heard in bases  Pulmonary consult  IV remdesivir, IV abx, IV steroids initiated    Plan for convalescent plasma  12/10 INcrease in oxygen need to [facility] with pulse ox 92%  12/11 PUlmonary consult: Acute hypoxemic respiratory failure secondary to COVID-19 pneumonia  Currently on moderate COVID pathway  Requiring 6 L nasal cannula with sats in the low 90s  No O2 requirement at baseline, titrate to maintain O2 sats greater than 90%  Continue steroids, remdesivir, S/P convalescent plasma  INflammatory markers elevated  Continue anticoag    Procal neg x 2   DC ab      ED Triage Vitals [12/10/20 1309]   Temperature Pulse Respirations Blood Pressure SpO2   98 6 °F (37 °C) 80 20 101/55 (!) 89 %      Temp Source Heart Rate Source Patient Position - Orthostatic VS BP Location FiO2 (%)   Oral Monitor Lying Left arm --      Pain Score       No Pain          Wt Readings from Last 1 Encounters:   12/11/20 62 1 kg (136 lb 14 5 oz)     Additional Vital Signs:   Date/Time  Temp  Pulse  Resp  BP  MAP (mmHg)  SpO2  Calculated FIO2 (%) - Nasal Cannula  Nasal Cannula O2 Flow Rate (L/min)  O2 Device  Patient Position - Orthostatic VS   12/11/20 1100  --  --  --  --  --  --  44  6 L/min  Nasal cannula  --   12/11/20 0900  --  --  --  --  --  --  44  6 L/min  Nasal cannula  --   12/11/20 0700  --  --  --  --  --  94 %  40  5 L/min  Nasal cannula  --   12/11/20 06:46:17  97 3 °F (36 3 °C)Abnormal   64  --  136/78  97  93 %  --  --  --  --   12/11/20 0645  97 6 °F (36 4 °C)  64  17  136/78  --  --  --  --  --  --   12/11/20 0615  --  58  --  136/78  --  --  --  --  --  --   12/11/20 0600  98 9 °F (37 2 °C)  --  17  --  --  95 %  --  --  --  --   12/11/20 05:54:41  97 3 °F (36 3 °C)Abnormal   72  17  133/78  96  94 %  --  --  Nasal cannula  Lying   12/11/20 0530  --  --  --  --  --  95 %  --  --  --  --   12/11/20 0300  --  --  --  --  --  94 %  40  5 L/min  None (Room air)  --   12/10/20 2300  --  --  --  --  --  94 %  40  5 L/min  None (Room air)  --   12/10/20 21:39:26  99 2 °F (37 3 °C)  84  19  111/66  81  95 %  --  --  --  -- 12/10/20 1900  --  --  --  --  --  91 %  40  5 L/min  --  --   12/10/20 1750  --  --  --  --  --  --  40  5 L/min  --  --   12/10/20 17:36:56  98 3 °F (36 8 °C)  69  18  118/64  82  95 %  --  --  None (Room air)  Lying   12/10/20 1650  --  70  24Abnormal   103/61  76  94 %  40  5 L/min  Nasal cannula  Lying   12/10/20 1500  --  77  26Abnormal   114/57  79  92 %  --  --  Nasal cannula  --   12/10/20 1309  98 6 °F (37 °C)  80  20  101/55  --  89 %Abnormal              Pertinent Labs/Diagnostic Test Results:   12/10 CXR: There are areas of groundglass density in the both lungs with peripheral predominance with associated organizing consolidation in the right midlung and right perihilar region  ,  Commonly reported imaging features of Covid 19 pneumonia are   present   Superimposed bacterial pneumonia is not entirely excluded       Other less likely differential consideration could include influenza pneumonia and organizing pneumonia     Correlate clinically     Results from last 7 days   Lab Units 12/10/20  1318   SARS-COV-2  Positive*     Results from last 7 days   Lab Units 12/11/20  0456 12/10/20  1318   WBC Thousand/uL 2 73* 3 04*   HEMOGLOBIN g/dL 11 0* 10 3*   HEMATOCRIT % 33 1* 30 2*   PLATELETS Thousands/uL 122* 131*   NEUTROS ABS Thousands/µL  --  2 37         Results from last 7 days   Lab Units 12/11/20  0456 12/10/20  1318   SODIUM mmol/L 141 136   POTASSIUM mmol/L 4 7 4 3   CHLORIDE mmol/L 107 105   CO2 mmol/L 25 22   ANION GAP mmol/L 9 9   BUN mg/dL 30* 41*   CREATININE mg/dL 1 17 1 41*   EGFR ml/min/1 73sq m 62 49   CALCIUM mg/dL 8 5 8 0*   MAGNESIUM mg/dL  --  2 1     Results from last 7 days   Lab Units 12/11/20  0456 12/10/20  1318   AST U/L 55* 58*   ALT U/L 27 27   ALK PHOS U/L 81 77   TOTAL PROTEIN g/dL 6 6 6 7   ALBUMIN g/dL 2 3* 2 5*   TOTAL BILIRUBIN mg/dL 0 40 0 50         Results from last 7 days   Lab Units 12/11/20  0456 12/10/20  1318   GLUCOSE RANDOM mg/dL 189* 174*       Results from last 7 days   Lab Units 12/10/20  1318   TROPONIN I ng/mL 0 02     Results from last 7 days   Lab Units 12/10/20  1318   D-DIMER QUANTITATIVE ug/ml FEU 1 99*     Results from last 7 days   Lab Units 12/10/20  1318   PROTIME seconds 13 2   INR  0 98   PTT seconds 33         Results from last 7 days   Lab Units 12/11/20  0456 12/10/20  1649 12/10/20  1318   PROCALCITONIN ng/ml 0 05 0 05 0 18     Results from last 7 days   Lab Units 12/10/20  1318   LACTIC ACID mmol/L 1 6             Results from last 7 days   Lab Units 12/10/20  1318   NT-PRO BNP pg/mL 442*     Results from last 7 days   Lab Units 12/10/20  1318   FERRITIN ng/mL 1,801*       Results from last 7 days   Lab Units 12/10/20  1318   CRP mg/L 229 7*     Results from last 7 days   Lab Units 12/10/20  1318   INFLUENZA A PCR  Negative   INFLUENZA B PCR  Negative   RSV PCR  Negative           Results from last 7 days   Lab Units 12/10/20  1318   BLOOD CULTURE  Received in Microbiology Lab  Culture in Progress  Received in Microbiology Lab  Culture in Progress       ED Treatment:   Medication Administration from 12/10/2020 1259 to 12/10/2020 1730       Date/Time Order Dose Route Action     12/10/2020 1405 cefepime (MAXIPIME) 2,000 mg in dextrose 5 % 50 mL IVPB 2,000 mg Intravenous New Bag     12/10/2020 1527 vancomycin (VANCOCIN) IVPB (premix in dextrose) 1,000 mg 200 mL 1,000 mg Intravenous New Bag        Past Medical History:   Diagnosis Date    Abnormality of gait and mobility     GERD (gastroesophageal reflux disease)     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     Other mechanical complication of other specified internal prosthetic devices, implants and grafts, sequela     Recurrent depressive disorder ([facility] [address] )     Wedge compression fracture of t11-T12 vertebra, initial encounter for closed fracture ([facility] [address] )     Wheezing        Admitting Diagnosis: Difficulty walking [R26 2]  Hypoxia [R09 02]  Sepsis, due to unspecified organism, unspecified whether acute organ dysfunction present ([facility] [address] ) [A41 9]  Pneumonia due to COVID-19 virus [U07 1, J12 89]  Age/Sex: 65 y o  male  Admission Orders:  SCDS  I/O  Up with assist  Self prone  CBC, CMP, CRP, ferritin, ddimer    Scheduled Medications:  ascorbic acid, 1,000 mg, Oral, Q12H ALEJANDRINA  aspirin, 325 mg, Oral, Daily  atorvastatin, 40 mg, Oral, HS  bisacodyl, 10 mg, Rectal, Daily  cholecalciferol, 2,000 Units, Oral, Daily  dexamethasone, 6 mg, Intravenous, Q24H  famotidine, 20 mg, Oral, Daily  gabapentin, 600 mg, Oral, TID  heparin (porcine), 5,000 Units, Subcutaneous, Q8H [address]  melatonin, 3 mg, Oral, HS  zinc sulfate, 220 mg, Oral, Daily    Followed by  Kenisha Killian ON 12/18/2020] multivitamin-minerals, 1 tablet, Oral, Daily  nicotine, 1 patch, Transdermal, Daily  remdesivir, 100 mg, Intravenous, Q24H  traZODone, 25 mg, Oral, HS      Continuous IV Infusions:  lactated ringers, 75 mL/hr, Intravenous, Continuous      PRN Meds:  acetaminophen, 650 mg, Oral, Q6H PRN  magnesium hydroxide, 30 mL, Oral, Daily PRN  ondansetron, 4 mg, Intravenous, Q6H PRN        IP CONSULT TO PULMONOLOGY    Network Utilization Review Department  [email or web address]  ATTENTION: Please call with any questions or concerns to [phone] and carefully listen to the prompts so that you are directed to the right person  All voicemails are confidential   Olena Bailey all requests for admission clinical reviews, approved or denied determinations and any other requests to dedicated fax number below belonging to the campus where the patient is receiving treatment   List of dedicated fax numbers for the Facilities:  FACILITY NAME UR FAX NUMBER   ADMISSION DENIALS (Administrative/Medical Necessity) [phone]   [address] (Maternity/NICU/Pediatrics) [phone]   Johan Almaraz [address] [place] [address] [facility] [phone]   [facility] [phone] [address] [facility]  [phone]   [address] [place] [address] [phone]

## 2020-12-12 PROBLEM — N17.9 AKI (ACUTE KIDNEY INJURY) (HCC): Status: RESOLVED | Noted: 2020-12-11 | Resolved: 2020-12-12

## 2020-12-12 LAB
ALBUMIN SERPL BCP-MCNC: 2.3 G/DL (ref 3.5–5)
ALP SERPL-CCNC: 84 U/L (ref 46–116)
ALT SERPL W P-5'-P-CCNC: 27 U/L (ref 12–78)
ANION GAP SERPL CALCULATED.3IONS-SCNC: 10 MMOL/L (ref 4–13)
AST SERPL W P-5'-P-CCNC: 50 U/L (ref 5–45)
BASOPHILS # BLD MANUAL: 0 THOUSAND/UL (ref 0–0.1)
BASOPHILS NFR MAR MANUAL: 0 % (ref 0–1)
BILIRUB SERPL-MCNC: 0.4 MG/DL (ref 0.2–1)
BUN SERPL-MCNC: 38 MG/DL (ref 5–25)
CALCIUM ALBUM COR SERPL-MCNC: 9.9 MG/DL (ref 8.3–10.1)
CALCIUM SERPL-MCNC: 8.5 MG/DL (ref 8.3–10.1)
CHLORIDE SERPL-SCNC: 109 MMOL/L (ref 100–108)
CO2 SERPL-SCNC: 23 MMOL/L (ref 21–32)
CREAT SERPL-MCNC: 1.08 MG/DL (ref 0.6–1.3)
CRP SERPL QL: 130.2 MG/L
D DIMER PPP FEU-MCNC: 2.2 UG/ML FEU
EOSINOPHIL # BLD MANUAL: 0 THOUSAND/UL (ref 0–0.4)
EOSINOPHIL NFR BLD MANUAL: 0 % (ref 0–6)
ERYTHROCYTE [DISTWIDTH] IN BLOOD BY AUTOMATED COUNT: 15.9 % (ref 11.6–15.1)
FERRITIN SERPL-MCNC: 2697 NG/ML (ref 8–388)
GFR SERPL CREATININE-BSD FRML MDRD: 68 ML/MIN/1.73SQ M
GLUCOSE SERPL-MCNC: 166 MG/DL (ref 65–140)
HCT VFR BLD AUTO: 28.1 % (ref 36.5–49.3)
HGB BLD-MCNC: 9.6 G/DL (ref 12–17)
LYMPHOCYTES # BLD AUTO: 0.6 THOUSAND/UL (ref 0.6–4.47)
LYMPHOCYTES # BLD AUTO: 13 % (ref 14–44)
MCH RBC QN AUTO: 31.2 PG (ref 26.8–34.3)
MCHC RBC AUTO-ENTMCNC: 34.2 G/DL (ref 31.4–37.4)
MCV RBC AUTO: 91 FL (ref 82–98)
MONOCYTES # BLD AUTO: 0.28 THOUSAND/UL (ref 0–1.22)
MONOCYTES NFR BLD: 6 % (ref 4–12)
NEUTROPHILS # BLD MANUAL: 3.69 THOUSAND/UL (ref 1.85–7.62)
NEUTS SEG NFR BLD AUTO: 80 % (ref 43–75)
NRBC BLD AUTO-RTO: 0 /100 WBCS
PLATELET # BLD AUTO: 142 THOUSANDS/UL (ref 149–390)
PLATELET BLD QL SMEAR: ABNORMAL
PMV BLD AUTO: 10.1 FL (ref 8.9–12.7)
POTASSIUM SERPL-SCNC: 4.7 MMOL/L (ref 3.5–5.3)
PROT SERPL-MCNC: 6.2 G/DL (ref 6.4–8.2)
RBC # BLD AUTO: 3.08 MILLION/UL (ref 3.88–5.62)
SODIUM SERPL-SCNC: 142 MMOL/L (ref 136–145)
TOTAL CELLS COUNTED SPEC: 100
VARIANT LYMPHS # BLD AUTO: 1 %
WBC # BLD AUTO: 4.61 THOUSAND/UL (ref 4.31–10.16)

## 2020-12-12 PROCEDURE — 80053 COMPREHEN METABOLIC PANEL: CPT | Performed by: NURSE PRACTITIONER

## 2020-12-12 PROCEDURE — 85027 COMPLETE CBC AUTOMATED: CPT | Performed by: NURSE PRACTITIONER

## 2020-12-12 PROCEDURE — 94760 N-INVAS EAR/PLS OXIMETRY 1: CPT

## 2020-12-12 PROCEDURE — 85007 BL SMEAR W/DIFF WBC COUNT: CPT | Performed by: NURSE PRACTITIONER

## 2020-12-12 PROCEDURE — 82728 ASSAY OF FERRITIN: CPT | Performed by: NURSE PRACTITIONER

## 2020-12-12 PROCEDURE — 94660 CPAP INITIATION&MGMT: CPT

## 2020-12-12 PROCEDURE — 86140 C-REACTIVE PROTEIN: CPT | Performed by: NURSE PRACTITIONER

## 2020-12-12 PROCEDURE — 85379 FIBRIN DEGRADATION QUANT: CPT | Performed by: NURSE PRACTITIONER

## 2020-12-12 PROCEDURE — 99232 SBSQ HOSP IP/OBS MODERATE 35: CPT | Performed by: NURSE PRACTITIONER

## 2020-12-12 RX ADMIN — FAMOTIDINE 20 MG: 20 TABLET ORAL at 08:22

## 2020-12-12 RX ADMIN — ASPIRIN 325 MG ORAL TABLET 325 MG: 325 PILL ORAL at 08:22

## 2020-12-12 RX ADMIN — GABAPENTIN 600 MG: 300 CAPSULE ORAL at 21:38

## 2020-12-12 RX ADMIN — Medication 1 PATCH: at 08:23

## 2020-12-12 RX ADMIN — TRAZODONE HYDROCHLORIDE 25 MG: 50 TABLET ORAL at 21:39

## 2020-12-12 RX ADMIN — HEPARIN SODIUM 5000 UNITS: 5000 INJECTION INTRAVENOUS; SUBCUTANEOUS at 14:36

## 2020-12-12 RX ADMIN — ZINC SULFATE 220 MG (50 MG) CAPSULE 220 MG: CAPSULE at 08:22

## 2020-12-12 RX ADMIN — Medication 2000 UNITS: at 08:22

## 2020-12-12 RX ADMIN — OXYCODONE HYDROCHLORIDE AND ACETAMINOPHEN 1000 MG: 500 TABLET ORAL at 08:22

## 2020-12-12 RX ADMIN — HEPARIN SODIUM 5000 UNITS: 5000 INJECTION INTRAVENOUS; SUBCUTANEOUS at 21:45

## 2020-12-12 RX ADMIN — ATORVASTATIN CALCIUM 40 MG: 40 TABLET, FILM COATED ORAL at 21:45

## 2020-12-12 RX ADMIN — DEXAMETHASONE SODIUM PHOSPHATE 6 MG: 4 INJECTION, SOLUTION INTRAMUSCULAR; INTRAVENOUS at 19:51

## 2020-12-12 RX ADMIN — MELATONIN 3 MG: at 21:39

## 2020-12-12 RX ADMIN — REMDESIVIR 100 MG: 100 INJECTION, POWDER, LYOPHILIZED, FOR SOLUTION INTRAVENOUS at 20:50

## 2020-12-12 RX ADMIN — GABAPENTIN 600 MG: 300 CAPSULE ORAL at 16:48

## 2020-12-12 RX ADMIN — OXYCODONE HYDROCHLORIDE AND ACETAMINOPHEN 1000 MG: 500 TABLET ORAL at 21:38

## 2020-12-12 RX ADMIN — HEPARIN SODIUM 5000 UNITS: 5000 INJECTION INTRAVENOUS; SUBCUTANEOUS at 05:29

## 2020-12-12 RX ADMIN — GABAPENTIN 600 MG: 300 CAPSULE ORAL at 08:22

## 2020-12-12 NOTE — CASE MANAGEMENT
CM received a call from patient's  sister Christina Durbin- [phone] who states patient lives alone in his senior apartment  Patient recently came home from being [facility] [place] for five weeks  Patient recently came home on Wednesday prior to this hospitalization  Christina Durbin states patient is too weak to do anything for himself at home  There are no stairs to enter from outside  Patient fills scripts from [employer], [employer]  Patient's pcp is Dr Alycia Villeda  Patient does not have [address] hx however suffers from a stroke  Patient does not have substance abuse hx  Patient's pcp is art  Patient is retired and does not drive  Upon clearance for discharge patient will need STR  A post acute care recommendation was made by your care team for STR  Discussed Freedom of Choice with caregiver  List of facilities given to caregiver via in person  caregiver aware the list is custom filtered for them by preferred and that [facility] post acute providers are designated  Referrals are pend  Cm to follow patient's needs treatment team informed  CM reviewed discharge planning process including the following: identifying help at home, patient preference for discharge planning needs, pharmacy preference, and availability of treatment team to discuss questions or concerns patient and/or family may have regarding understanding medications and recognizing signs and symptoms once discharged  CM also encouraged patient to follow up with all recommended appointments after discharge  Patient advised of importance for patient and family to participate in managing patients medical well being

## 2020-12-12 NOTE — PROGRESS NOTES
Progress Note - Ruperto Book 1950, 65 y o  male MRN: [id number]    Unit/Bed#: -01 Encounter: [id number]    Primary Care Provider: Koffi Delaney MD   Date and time admitted to hospital: 12/10/2020 12:59 PM    * COVID-19 virus infection  Assessment & Plan  Will admit patient on moderate COVID-19 protocol as he is on 4-5 L of oxygen satting 92%   IV remdesivir, IV Decadron, multivitamin  Procalcitonin negative x2, IV antibiotics discontinued  DVT prophylaxis  COVID labs are elevated, continue to trend  D-dimer increased, repeat in a m  If continues to worsen or hypoxia worsens consider PE study  Pulmonary following      Acute respiratory failure with hypoxia ([facility])  Assessment & Plan  Patient decreased to 4 L of oxygen via nasal cannula for saturation 93-95%  Continue oxygen supplementation      Acute metabolic encephalopathy  Assessment & Plan  Likely secondary to COVID-19 infection  Continue to monitor clinically  Seems to be improving    COPD (chronic obstructive pulmonary disease) ([facility])  Assessment & Plan  Albuterol p r n  CVA (cerebral vascular accident) [facility])  Assessment & Plan  Recent history of strokes few months ago mild right upper extremity weakness  Fall precautions    Essential hypertension  Assessment & Plan  With hypotensive  IV fluid hydration  Continue to monitor    Other hyperlipidemia  Assessment & Plan  Will change statin to atorvastatin 40 mg due to COVID infection    JOSHUA (acute kidney injury) ([facility])-resolved as of 12/12/2020  Assessment & Plan  Creatinine was 1 7on admission improved to 1 08 with IV fluids  Resolved with IV fluids       VTE Pharmacologic Prophylaxis:   Pharmacologic: Heparin  Mechanical VTE Prophylaxis in Place: Yes    Patient Centered Rounds: I have performed bedside rounds with nursing staff today      Discussions with Specialists or Other Care Team Provider:  Discussed with primary RN    Education and Discussions with Family / Patient:  Discussed plan of care with patient denies any additional questions or concerns    Time Spent for Care: 20 minutes  More than 50% of total time spent on counseling and coordination of care as described above  Current Length of Stay: 2 day(s)    Current Patient Status: Inpatient   Certification Statement: The patient will continue to require additional inpatient hospital stay due to IV remdesivir, IV steroids, oxygen support    Discharge Plan / Estimated Discharge Date: >48    Code Status: Level 2 - DNAR: but accepts endotracheal intubation    Subjective:   Denies any chest pain chest tightness reports that he is continuing to feel better is compliant with lying on his side given his arm deformity 3 difficult for him to get over in his stomach  Eating well decreased appetite    Objective:     Vitals:   Temp (24hrs), Av 8 °F (36 6 °C), Min:97 4 °F (36 3 °C), Max:98 3 °F (36 8 °C)    Temp:  [97 4 °F (36 3 °C)-98 3 °F (36 8 °C)] 97 6 °F (36 4 °C)  HR:  [68-79] 68  Resp:  [20] 20  BP: (105-129)/(65-71) 129/71  SpO2:  [82 %-94 %] 92 %  Body mass index is 22 71 kg/m²  Input and Output Summary (last 24 hours): Intake/Output Summary (Last 24 hours) at 2020 1242  Last data filed at 2020 0239  Gross per 24 hour   Intake --   Output 650 ml   Net -650 ml       Physical Exam:     Physical Exam  Vitals signs and nursing note reviewed  Constitutional:       General: He is not in acute distress  Appearance: He is underweight  HENT:      Head: Normocephalic  Nose: Nose normal    Cardiovascular:      Rate and Rhythm: Normal rate  Pulmonary:      Effort: Pulmonary effort is normal    Abdominal:      General: Abdomen is flat  Musculoskeletal:         General: Deformity present  Skin:     General: Skin is warm and dry  Coloration: Skin is pale  Neurological:      General: No focal deficit present  Mental Status: He is alert  Mental status is at baseline     Psychiatric:         Mood and Affect: Mood normal          Thought Content: Thought content normal      Additional Data:     Labs:    Results from last 7 days   Lab Units 12/12/20  0534  12/10/20  1318   WBC Thousand/uL 4 61   < > 3 04*   HEMOGLOBIN g/dL 9 6*   < > 10 3*   HEMATOCRIT % 28 1*   < > 30 2*   PLATELETS Thousands/uL 142*   < > 131*   NEUTROS PCT %  --   --  77*   LYMPHS PCT %  --   --  14   LYMPHO PCT % 13*  --   --    MONOS PCT %  --   --  8   MONO PCT % 6  --   --    EOS PCT % 0  --  0    < > = values in this interval not displayed  Results from last 7 days   Lab Units 12/12/20  0630   POTASSIUM mmol/L 4 7   CHLORIDE mmol/L 109*   CO2 mmol/L 23   BUN mg/dL 38*   CREATININE mg/dL 1 08   CALCIUM mg/dL 8 5   ALK PHOS U/L 84   ALT U/L 27   AST U/L 50*     Results from last 7 days   Lab Units 12/10/20  1318   INR  0 98       * I Have Reviewed All Lab Data Listed Above  * Additional Pertinent Lab Tests Reviewed: [address] Admission Reviewed    Recent Cultures (last 7 days):     Results from last 7 days   Lab Units 12/10/20  1318   BLOOD CULTURE  No Growth at 24 hrs     GRAM STAIN RESULT  Gram positive cocci in clusters*       Last 24 Hours Medication List:   Current Facility-Administered Medications   Medication Dose Route Frequency Provider Last Rate    acetaminophen  650 mg Oral Q6H PRN Lisa Tijerina MD      ascorbic acid  1,000 mg Oral Q12H Romeo Callahan MD      aspirin  325 mg Oral Daily Lisa Tijerina MD      atorvastatin  40 mg Oral HS Lisa Tijerina MD      bisacodyl  10 mg Rectal Daily Lisa Tijerina MD      cholecalciferol  2,000 Units Oral Daily Lisa Tijerina MD      dexamethasone  6 mg Intravenous Q24H Lisa Tijerina MD      famotidine  20 mg Oral Daily Lisa Tijerina MD      gabapentin  600 mg Oral TID Lisa Tijerina MD      heparin (porcine)  5,000 Units Subcutaneous Q8H [address] Lisa Tijerina MD      magnesium hydroxide  30 mL Oral Daily PRN Lisa Tijerina MD      melatonin  3 mg Oral HS Sheila Nance MD      zinc sulfate  220 mg Oral Daily Sheila Nance MD      Followed by   Georgina Maldonado ON 12/18/2020] multivitamin-minerals  1 tablet Oral Daily Sheila Nance MD      nicotine  1 patch Transdermal Daily Sheila Nance MD      ondansetron  4 mg Intravenous Q6H PRN Sheila Nance MD      remdesivir  100 mg Intravenous Q24H Sheila Nance MD      traZODone  25 mg Oral HS Sheila Nance MD          Today, Patient Was Seen By: ZULMA Mackenzie    ** Please Note: Dragon 360 Dictation voice to text software may have been used in the creation of this document   **

## 2020-12-12 NOTE — QUICK NOTE
Notified by RN [facility] x1 came back (+) for gram (+) cocci in clusters  However, other BC negative at this time  Possible contaminant? At this time continue to follow cxs and with only one BC (+) will hold off on starting abx

## 2020-12-12 NOTE — ASSESSMENT & PLAN NOTE
Will admit patient on moderate COVID-19 protocol as he is on 4-5 L of oxygen satting 92%   IV remdesivir, IV Decadron, multivitamin  Procalcitonin negative x2, IV antibiotics discontinued  DVT prophylaxis  COVID labs are elevated, continue to trend  D-dimer increased, repeat in a m   If continues to worsen or hypoxia worsens consider PE study  Pulmonary following

## 2020-12-12 NOTE — ASSESSMENT & PLAN NOTE
Patient decreased to 4 L of oxygen via nasal cannula for saturation 93-95%  Continue oxygen supplementation

## 2020-12-13 LAB
ALBUMIN SERPL BCP-MCNC: 2.4 G/DL (ref 3.5–5)
ALP SERPL-CCNC: 99 U/L (ref 46–116)
ALT SERPL W P-5'-P-CCNC: 35 U/L (ref 12–78)
ANION GAP SERPL CALCULATED.3IONS-SCNC: 13 MMOL/L (ref 4–13)
AST SERPL W P-5'-P-CCNC: 49 U/L (ref 5–45)
BASOPHILS # BLD AUTO: 0.01 THOUSANDS/ΜL (ref 0–0.1)
BASOPHILS NFR BLD AUTO: 0 % (ref 0–1)
BILIRUB SERPL-MCNC: 0.4 MG/DL (ref 0.2–1)
BUN SERPL-MCNC: 30 MG/DL (ref 5–25)
CALCIUM ALBUM COR SERPL-MCNC: 10.1 MG/DL (ref 8.3–10.1)
CALCIUM SERPL-MCNC: 8.8 MG/DL (ref 8.3–10.1)
CHLORIDE SERPL-SCNC: 109 MMOL/L (ref 100–108)
CO2 SERPL-SCNC: 22 MMOL/L (ref 21–32)
CREAT SERPL-MCNC: 0.96 MG/DL (ref 0.6–1.3)
CRP SERPL QL: 73.8 MG/L
D DIMER PPP FEU-MCNC: 1.99 UG/ML FEU
EOSINOPHIL # BLD AUTO: 0 THOUSAND/ΜL (ref 0–0.61)
EOSINOPHIL NFR BLD AUTO: 0 % (ref 0–6)
ERYTHROCYTE [DISTWIDTH] IN BLOOD BY AUTOMATED COUNT: 15.9 % (ref 11.6–15.1)
FERRITIN SERPL-MCNC: 2048 NG/ML (ref 8–388)
GFR SERPL CREATININE-BSD FRML MDRD: 79 ML/MIN/1.73SQ M
GLUCOSE SERPL-MCNC: 192 MG/DL (ref 65–140)
HCT VFR BLD AUTO: 31.9 % (ref 36.5–49.3)
HGB BLD-MCNC: 10.7 G/DL (ref 12–17)
IMM GRANULOCYTES # BLD AUTO: 0.05 THOUSAND/UL (ref 0–0.2)
IMM GRANULOCYTES NFR BLD AUTO: 1 % (ref 0–2)
LYMPHOCYTES # BLD AUTO: 0.44 THOUSANDS/ΜL (ref 0.6–4.47)
LYMPHOCYTES NFR BLD AUTO: 7 % (ref 14–44)
MCH RBC QN AUTO: 30.4 PG (ref 26.8–34.3)
MCHC RBC AUTO-ENTMCNC: 33.5 G/DL (ref 31.4–37.4)
MCV RBC AUTO: 91 FL (ref 82–98)
MONOCYTES # BLD AUTO: 0.36 THOUSAND/ΜL (ref 0.17–1.22)
MONOCYTES NFR BLD AUTO: 5 % (ref 4–12)
NEUTROPHILS # BLD AUTO: 5.78 THOUSANDS/ΜL (ref 1.85–7.62)
NEUTS SEG NFR BLD AUTO: 87 % (ref 43–75)
NRBC BLD AUTO-RTO: 0 /100 WBCS
PLATELET # BLD AUTO: 154 THOUSANDS/UL (ref 149–390)
PMV BLD AUTO: 10.2 FL (ref 8.9–12.7)
POTASSIUM SERPL-SCNC: 4.5 MMOL/L (ref 3.5–5.3)
PROT SERPL-MCNC: 6.5 G/DL (ref 6.4–8.2)
RBC # BLD AUTO: 3.52 MILLION/UL (ref 3.88–5.62)
SODIUM SERPL-SCNC: 144 MMOL/L (ref 136–145)
WBC # BLD AUTO: 6.64 THOUSAND/UL (ref 4.31–10.16)

## 2020-12-13 PROCEDURE — 99232 SBSQ HOSP IP/OBS MODERATE 35: CPT | Performed by: NURSE PRACTITIONER

## 2020-12-13 PROCEDURE — 85379 FIBRIN DEGRADATION QUANT: CPT | Performed by: NURSE PRACTITIONER

## 2020-12-13 PROCEDURE — 94760 N-INVAS EAR/PLS OXIMETRY 1: CPT

## 2020-12-13 PROCEDURE — 94660 CPAP INITIATION&MGMT: CPT

## 2020-12-13 PROCEDURE — 82728 ASSAY OF FERRITIN: CPT | Performed by: NURSE PRACTITIONER

## 2020-12-13 PROCEDURE — 80053 COMPREHEN METABOLIC PANEL: CPT | Performed by: NURSE PRACTITIONER

## 2020-12-13 PROCEDURE — 86140 C-REACTIVE PROTEIN: CPT | Performed by: NURSE PRACTITIONER

## 2020-12-13 PROCEDURE — 85025 COMPLETE CBC W/AUTO DIFF WBC: CPT | Performed by: NURSE PRACTITIONER

## 2020-12-13 RX ADMIN — HEPARIN SODIUM 5000 UNITS: 5000 INJECTION INTRAVENOUS; SUBCUTANEOUS at 13:14

## 2020-12-13 RX ADMIN — ACETAMINOPHEN 650 MG: 325 TABLET, FILM COATED ORAL at 10:14

## 2020-12-13 RX ADMIN — FAMOTIDINE 20 MG: 20 TABLET ORAL at 10:15

## 2020-12-13 RX ADMIN — DEXAMETHASONE SODIUM PHOSPHATE 6 MG: 4 INJECTION, SOLUTION INTRAMUSCULAR; INTRAVENOUS at 20:12

## 2020-12-13 RX ADMIN — HEPARIN SODIUM 5000 UNITS: 5000 INJECTION INTRAVENOUS; SUBCUTANEOUS at 05:39

## 2020-12-13 RX ADMIN — ASPIRIN 325 MG ORAL TABLET 325 MG: 325 PILL ORAL at 10:15

## 2020-12-13 RX ADMIN — Medication 2000 UNITS: at 10:14

## 2020-12-13 RX ADMIN — ATORVASTATIN CALCIUM 40 MG: 40 TABLET, FILM COATED ORAL at 23:16

## 2020-12-13 RX ADMIN — OXYCODONE HYDROCHLORIDE AND ACETAMINOPHEN 1000 MG: 500 TABLET ORAL at 20:27

## 2020-12-13 RX ADMIN — MELATONIN 3 MG: at 23:17

## 2020-12-13 RX ADMIN — GABAPENTIN 600 MG: 300 CAPSULE ORAL at 20:27

## 2020-12-13 RX ADMIN — OXYCODONE HYDROCHLORIDE AND ACETAMINOPHEN 1000 MG: 500 TABLET ORAL at 10:15

## 2020-12-13 RX ADMIN — ZINC SULFATE 220 MG (50 MG) CAPSULE 220 MG: CAPSULE at 10:14

## 2020-12-13 RX ADMIN — TRAZODONE HYDROCHLORIDE 25 MG: 50 TABLET ORAL at 23:16

## 2020-12-13 RX ADMIN — REMDESIVIR 100 MG: 100 INJECTION, POWDER, LYOPHILIZED, FOR SOLUTION INTRAVENOUS at 20:11

## 2020-12-13 RX ADMIN — GABAPENTIN 600 MG: 300 CAPSULE ORAL at 15:17

## 2020-12-13 RX ADMIN — HEPARIN SODIUM 5000 UNITS: 5000 INJECTION INTRAVENOUS; SUBCUTANEOUS at 23:17

## 2020-12-13 RX ADMIN — GABAPENTIN 600 MG: 300 CAPSULE ORAL at 10:14

## 2020-12-13 NOTE — ASSESSMENT & PLAN NOTE
Will admit patient on moderate COVID-19 protocol as he is on 15 L MFNC of oxygen satting 92-95%   IV remdesivir, IV Decadron, multivitamin  Procalcitonin negative x2, IV antibiotics discontinued  DVT prophylaxis  COVID labs are elevated, continue to trend  D-dimer is improving  Pulmonary following

## 2020-12-13 NOTE — PROGRESS NOTES
Progress Note - Shahriar Sharp 1950, 65 y o  male MRN: [id number]    Unit/Bed#: -01 Encounter: [id number]    Primary Care Provider: Monica Aguilar MD   Date and time admitted to hospital: 12/10/2020 12:59 PM    * COVID-19 virus infection  Assessment & Plan  Will admit patient on moderate COVID-19 protocol as he is on 15 L [address] of oxygen satting 92-95%   IV remdesivir, IV Decadron, multivitamin  Procalcitonin negative x2, IV antibiotics discontinued  DVT prophylaxis  COVID labs are elevated, continue to trend  D-dimer is improving  Pulmonary following      Acute respiratory failure with hypoxia ([facility] [address] )  Assessment & Plan  Hypoxia worsened overnight patient is now on mid flow nasal cannula 15 L  Saturating in the low 31%    Acute metabolic encephalopathy  Assessment & Plan  Likely secondary to COVID-19 infection  Continue to monitor clinically  Seems to be improving    COPD (chronic obstructive pulmonary disease) ([facility])  Assessment & Plan  Albuterol p r n  CVA (cerebral vascular accident) [facility])  Assessment & Plan  Recent history of strokes few months ago mild right upper extremity weakness  Fall precautions    Essential hypertension  Assessment & Plan  With hypotensive  IV fluid hydration  Continue to monitor    Other hyperlipidemia  Assessment & Plan  Will change statin to atorvastatin 40 mg due to COVID infection       VTE Pharmacologic Prophylaxis:   Pharmacologic: Heparin  Mechanical VTE Prophylaxis in Place: Yes    Patient Centered Rounds: I have performed bedside rounds with nursing staff today  Discussions with Specialists or Other Care Team Provider:  Discussed with primary RN    Education and Discussions with Family / Patient:  Discussed plan of care with patient denies any additional questions or concerns    Time Spent for Care: 20 minutes  More than 50% of total time spent on counseling and coordination of care as described above      Current Length of Stay: 3 day(s)    Current Patient Status: Inpatient   Certification Statement: The patient will continue to require additional inpatient hospital stay due to Increased oxygen requirements, IV steroids, IV remdesivir     Discharge Plan / Estimated Discharge Date: > 72 hours      Code Status: Level 2 - DNAR: but accepts endotracheal intubation      Subjective:   Patient looks very fatigued today, increased work of breathing today encouraged self proning and incentive spirometer    Objective:     Vitals:   Temp (24hrs), Av 8 °F (36 6 °C), Min:97 2 °F (36 2 °C), Max:98 1 °F (36 7 °C)    Temp:  [97 2 °F (36 2 °C)-98 1 °F (36 7 °C)] 98 °F (36 7 °C)  HR:  [63-88] 78  Resp:  [18-20] 18  BP: (110-148)/(69-74) 110/72  SpO2:  [85 %-99 %] 95 %  Body mass index is 22 29 kg/m²  Input and Output Summary (last 24 hours): Intake/Output Summary (Last 24 hours) at 2020 1046  Last data filed at 2020 0900  Gross per 24 hour   Intake 200 ml   Output 2200 ml   Net -2000 ml       Physical Exam:     Physical Exam  Vitals signs and nursing note reviewed  Constitutional:       General: He is in acute distress  Appearance: He is ill-appearing  HENT:      Head: Normocephalic  Nose: Nose normal    Cardiovascular:      Rate and Rhythm: Normal rate  Pulmonary:      Effort: Pulmonary effort is normal       Breath sounds: Rhonchi present  Abdominal:      General: Abdomen is flat  Skin:     General: Skin is warm  Neurological:      General: No focal deficit present  Mental Status: He is alert  Psychiatric:         Mood and Affect: Mood normal          Thought Content:  Thought content normal          Judgment: Judgment normal        Additional Data:     Labs:    Results from last 7 days   Lab Units 20   WBC Thousand/uL 6 64   HEMOGLOBIN g/dL 10 7*   HEMATOCRIT % 31 9*   PLATELETS Thousands/uL 154   NEUTROS PCT % 87*   LYMPHS PCT % 7*   MONOS PCT % 5   EOS PCT % 0     Results from last 7 days   Lab Units 20 POTASSIUM mmol/L 4 5   CHLORIDE mmol/L 109*   CO2 mmol/L 22   BUN mg/dL 30*   CREATININE mg/dL 0 96   CALCIUM mg/dL 8 8   ALK PHOS U/L 99   ALT U/L 35   AST U/L 49*     Results from last 7 days   Lab Units 12/10/20  1318   INR  0 98       * I Have Reviewed All Lab Data Listed Above  * Additional Pertinent Lab Tests Reviewed: [facility] Admission Reviewed    Recent Cultures (last 7 days):     Results from last 7 days   Lab Units 12/10/20  1318   BLOOD CULTURE  Staphylococcus coagulase negative*  No Growth at 48 hrs     GRAM STAIN RESULT  Gram positive cocci in clusters*       Last 24 Hours Medication List:   Current Facility-Administered Medications   Medication Dose Route Frequency Provider Last Rate    acetaminophen  650 mg Oral Q6H PRN Shelbi Armenta MD      ascorbic acid  1,000 mg Oral Q12H Eliot Mae MD      aspirin  325 mg Oral Daily Shelbi Armenta MD      atorvastatin  40 mg Oral HS Shelbi Armenta MD      bisacodyl  10 mg Rectal Daily Shelbi Armenta MD      cholecalciferol  2,000 Units Oral Daily Shelbi Armenta MD      dexamethasone  6 mg Intravenous Q24H Shelbi Armenta MD      famotidine  20 mg Oral Daily Shelbi Armneta MD      gabapentin  600 mg Oral TID Shelbi Armenta MD      heparin (porcine)  5,000 Units Subcutaneous Q8H Eliot Mae MD      magnesium hydroxide  30 mL Oral Daily PRN Shelbi Armenta MD      melatonin  3 mg Oral HS Shelbi Armenta MD      zinc sulfate  220 mg Oral Daily Shelbi Armenta MD      Followed by   Sana Ley ON 12/18/2020] multivitamin-minerals  1 tablet Oral Daily Shelbi Armenta MD      nicotine  1 patch Transdermal Daily Shelbi Armenta MD      ondansetron  4 mg Intravenous Q6H PRN Shelbi Armenta MD      remdesivir  100 mg Intravenous Q24H Shelbi Armenta MD      traZODone  25 mg Oral HS Shelbi Armenta MD          Today, Patient Was Seen By: ZULMA Taylor    ** Please Note: Dragon 360 Dictation voice to text software may have been used in the creation of this document   **

## 2020-12-14 ENCOUNTER — APPOINTMENT (INPATIENT)
Dept: CT IMAGING | Facility: HOSPITAL | Age: 70
DRG: 177 | End: 2020-12-14
Payer: COMMERCIAL

## 2020-12-14 PROBLEM — S06.5X9A SUBDURAL HEMATOMA (HCC): Status: ACTIVE | Noted: 2020-12-14

## 2020-12-14 PROBLEM — R73.9 HYPERGLYCEMIA: Status: ACTIVE | Noted: 2020-12-14

## 2020-12-14 PROBLEM — R29.898 WEAKNESS OF LEFT UPPER EXTREMITY: Status: ACTIVE | Noted: 2020-12-14

## 2020-12-14 PROBLEM — I48.91 NEW ONSET ATRIAL FIBRILLATION (HCC): Status: ACTIVE | Noted: 2020-12-14

## 2020-12-14 PROBLEM — R78.81 POSITIVE BLOOD CULTURE: Status: ACTIVE | Noted: 2020-12-14

## 2020-12-14 LAB
ATRIAL RATE: 136 BPM
ATRIAL RATE: 159 BPM
BACTERIA BLD CULT: ABNORMAL
BASOPHILS # BLD AUTO: 0.01 THOUSANDS/ΜL (ref 0–0.1)
BASOPHILS NFR BLD AUTO: 0 % (ref 0–1)
CRP SERPL QL: 42.8 MG/L
D DIMER PPP FEU-MCNC: 1.93 UG/ML FEU
EOSINOPHIL # BLD AUTO: 0 THOUSAND/ΜL (ref 0–0.61)
EOSINOPHIL NFR BLD AUTO: 0 % (ref 0–6)
ERYTHROCYTE [DISTWIDTH] IN BLOOD BY AUTOMATED COUNT: 15.9 % (ref 11.6–15.1)
EST. AVERAGE GLUCOSE BLD GHB EST-MCNC: 140 MG/DL
FERRITIN SERPL-MCNC: 1715 NG/ML (ref 8–388)
GLUCOSE SERPL-MCNC: 268 MG/DL (ref 65–140)
GLUCOSE SERPL-MCNC: 372 MG/DL (ref 65–140)
GLUCOSE SERPL-MCNC: 422 MG/DL (ref 65–140)
GRAM STN SPEC: ABNORMAL
HBA1C MFR BLD: 6.5 %
HCT VFR BLD AUTO: 32.4 % (ref 36.5–49.3)
HGB BLD-MCNC: 10.9 G/DL (ref 12–17)
IMM GRANULOCYTES # BLD AUTO: 0.12 THOUSAND/UL (ref 0–0.2)
IMM GRANULOCYTES NFR BLD AUTO: 2 % (ref 0–2)
LYMPHOCYTES # BLD AUTO: 0.46 THOUSANDS/ΜL (ref 0.6–4.47)
LYMPHOCYTES NFR BLD AUTO: 6 % (ref 14–44)
MCH RBC QN AUTO: 30.3 PG (ref 26.8–34.3)
MCHC RBC AUTO-ENTMCNC: 33.6 G/DL (ref 31.4–37.4)
MCV RBC AUTO: 90 FL (ref 82–98)
MONOCYTES # BLD AUTO: 0.35 THOUSAND/ΜL (ref 0.17–1.22)
MONOCYTES NFR BLD AUTO: 5 % (ref 4–12)
NEUTROPHILS # BLD AUTO: 6.62 THOUSANDS/ΜL (ref 1.85–7.62)
NEUTS SEG NFR BLD AUTO: 87 % (ref 43–75)
NRBC BLD AUTO-RTO: 0 /100 WBCS
PLATELET # BLD AUTO: 193 THOUSANDS/UL (ref 149–390)
PMV BLD AUTO: 10.4 FL (ref 8.9–12.7)
PROCALCITONIN SERPL-MCNC: <0.05 NG/ML
QRS AXIS: 36 DEGREES
QRS AXIS: 47 DEGREES
QRSD INTERVAL: 80 MS
QRSD INTERVAL: 82 MS
QT INTERVAL: 326 MS
QT INTERVAL: 338 MS
QTC INTERVAL: 461 MS
QTC INTERVAL: 479 MS
RBC # BLD AUTO: 3.6 MILLION/UL (ref 3.88–5.62)
T WAVE AXIS: -19 DEGREES
T WAVE AXIS: -47 DEGREES
TSH SERPL DL<=0.05 MIU/L-ACNC: 0.34 UIU/ML (ref 0.36–3.74)
VENTRICULAR RATE: 112 BPM
VENTRICULAR RATE: 130 BPM
WBC # BLD AUTO: 7.56 THOUSAND/UL (ref 4.31–10.16)

## 2020-12-14 PROCEDURE — 82948 REAGENT STRIP/BLOOD GLUCOSE: CPT

## 2020-12-14 PROCEDURE — 94660 CPAP INITIATION&MGMT: CPT

## 2020-12-14 PROCEDURE — 83036 HEMOGLOBIN GLYCOSYLATED A1C: CPT | Performed by: PHYSICIAN ASSISTANT

## 2020-12-14 PROCEDURE — 93010 ELECTROCARDIOGRAM REPORT: CPT | Performed by: INTERNAL MEDICINE

## 2020-12-14 PROCEDURE — 84439 ASSAY OF FREE THYROXINE: CPT | Performed by: PHYSICIAN ASSISTANT

## 2020-12-14 PROCEDURE — 84145 PROCALCITONIN (PCT): CPT | Performed by: NURSE PRACTITIONER

## 2020-12-14 PROCEDURE — 94760 N-INVAS EAR/PLS OXIMETRY 1: CPT

## 2020-12-14 PROCEDURE — G1004 CDSM NDSC: HCPCS

## 2020-12-14 PROCEDURE — 86140 C-REACTIVE PROTEIN: CPT | Performed by: NURSE PRACTITIONER

## 2020-12-14 PROCEDURE — 85379 FIBRIN DEGRADATION QUANT: CPT | Performed by: NURSE PRACTITIONER

## 2020-12-14 PROCEDURE — 99232 SBSQ HOSP IP/OBS MODERATE 35: CPT | Performed by: PHYSICIAN ASSISTANT

## 2020-12-14 PROCEDURE — 84443 ASSAY THYROID STIM HORMONE: CPT | Performed by: PHYSICIAN ASSISTANT

## 2020-12-14 PROCEDURE — 70450 CT HEAD/BRAIN W/O DYE: CPT

## 2020-12-14 PROCEDURE — 99221 1ST HOSP IP/OBS SF/LOW 40: CPT | Performed by: PSYCHIATRY & NEUROLOGY

## 2020-12-14 PROCEDURE — G2012 BRIEF CHECK IN BY MD/QHP: HCPCS | Performed by: INTERNAL MEDICINE

## 2020-12-14 PROCEDURE — 99232 SBSQ HOSP IP/OBS MODERATE 35: CPT | Performed by: INTERNAL MEDICINE

## 2020-12-14 PROCEDURE — 82728 ASSAY OF FERRITIN: CPT | Performed by: NURSE PRACTITIONER

## 2020-12-14 PROCEDURE — 85025 COMPLETE CBC W/AUTO DIFF WBC: CPT | Performed by: NURSE PRACTITIONER

## 2020-12-14 PROCEDURE — 93005 ELECTROCARDIOGRAM TRACING: CPT

## 2020-12-14 RX ORDER — METOPROLOL TARTRATE 5 MG/5ML
2.5 INJECTION INTRAVENOUS EVERY 6 HOURS PRN
Status: DISCONTINUED | OUTPATIENT
Start: 2020-12-14 | End: 2020-12-22

## 2020-12-14 RX ORDER — INSULIN GLARGINE 100 [IU]/ML
5 INJECTION, SOLUTION SUBCUTANEOUS
Status: DISCONTINUED | OUTPATIENT
Start: 2020-12-14 | End: 2020-12-15

## 2020-12-14 RX ADMIN — ASPIRIN 325 MG ORAL TABLET 325 MG: 325 PILL ORAL at 09:38

## 2020-12-14 RX ADMIN — HEPARIN SODIUM 5000 UNITS: 5000 INJECTION INTRAVENOUS; SUBCUTANEOUS at 14:15

## 2020-12-14 RX ADMIN — HEPARIN SODIUM 5000 UNITS: 5000 INJECTION INTRAVENOUS; SUBCUTANEOUS at 21:52

## 2020-12-14 RX ADMIN — DEXAMETHASONE SODIUM PHOSPHATE 6 MG: 4 INJECTION, SOLUTION INTRAMUSCULAR; INTRAVENOUS at 18:38

## 2020-12-14 RX ADMIN — Medication 2000 UNITS: at 09:37

## 2020-12-14 RX ADMIN — INSULIN GLARGINE 5 UNITS: 100 INJECTION, SOLUTION SUBCUTANEOUS at 21:51

## 2020-12-14 RX ADMIN — FAMOTIDINE 20 MG: 20 TABLET ORAL at 09:46

## 2020-12-14 RX ADMIN — ZINC SULFATE 220 MG (50 MG) CAPSULE 220 MG: CAPSULE at 09:38

## 2020-12-14 RX ADMIN — Medication 1 PATCH: at 09:38

## 2020-12-14 RX ADMIN — ATORVASTATIN CALCIUM 40 MG: 40 TABLET, FILM COATED ORAL at 21:46

## 2020-12-14 RX ADMIN — TRAZODONE HYDROCHLORIDE 25 MG: 50 TABLET ORAL at 21:46

## 2020-12-14 RX ADMIN — DILTIAZEM HYDROCHLORIDE 30 MG: 30 TABLET, FILM COATED ORAL at 14:15

## 2020-12-14 RX ADMIN — INSULIN LISPRO 4 UNITS: 100 INJECTION, SOLUTION INTRAVENOUS; SUBCUTANEOUS at 17:00

## 2020-12-14 RX ADMIN — GABAPENTIN 600 MG: 300 CAPSULE ORAL at 09:38

## 2020-12-14 RX ADMIN — OXYCODONE HYDROCHLORIDE AND ACETAMINOPHEN 1000 MG: 500 TABLET ORAL at 09:38

## 2020-12-14 RX ADMIN — INSULIN LISPRO 2 UNITS: 100 INJECTION, SOLUTION INTRAVENOUS; SUBCUTANEOUS at 21:52

## 2020-12-14 RX ADMIN — REMDESIVIR 100 MG: 100 INJECTION, POWDER, LYOPHILIZED, FOR SOLUTION INTRAVENOUS at 22:02

## 2020-12-14 RX ADMIN — GABAPENTIN 600 MG: 300 CAPSULE ORAL at 21:45

## 2020-12-14 RX ADMIN — MELATONIN 3 MG: at 21:45

## 2020-12-14 RX ADMIN — HEPARIN SODIUM 5000 UNITS: 5000 INJECTION INTRAVENOUS; SUBCUTANEOUS at 06:01

## 2020-12-14 RX ADMIN — GABAPENTIN 600 MG: 300 CAPSULE ORAL at 17:00

## 2020-12-14 RX ADMIN — OXYCODONE HYDROCHLORIDE AND ACETAMINOPHEN 1000 MG: 500 TABLET ORAL at 21:45

## 2020-12-14 RX ADMIN — DILTIAZEM HYDROCHLORIDE 30 MG: 30 TABLET, FILM COATED ORAL at 17:05

## 2020-12-14 RX ADMIN — INSULIN LISPRO 4 UNITS: 100 INJECTION, SOLUTION INTRAVENOUS; SUBCUTANEOUS at 14:15

## 2020-12-14 NOTE — RESPIRATORY THERAPY NOTE
12/13/20 1934   Non-Invasive Information   Interface [address] [facility] prongs   Non-Invasive Ventilation Mode MFNC (Mid flow)   SpO2 92 %   $ Pulse Oximetry Spot Check Charge Completed   Resp Comments pt remains on [address] [facility] at this time, pt is still reguiring a alejandro liter flow than allowed by a standad NC   Non-Invasive Settings   Flow (lpm) 8   Non-Invasive Readings   Skin Intervention Skin intact

## 2020-12-14 NOTE — CONSULTS
Consultation - Cardiology   Santana Francisco 65 y o  male MRN: [id number]  Unit/Bed#: -01 Encounter: [id number]  12/14/20  1:19 PM    Assessment/ Plan:  1-new onset of atrial fibrillation with RVR, heart rates in the 140s, add Cardizem 30 Q 6  Check TSH  Patient will need anticoagulation given chads 2 Vasc =4 (htn 1, age 1, stroke 2)  No need to check echocardiogram at this time as it will not   Monitor telemetry  2-COVID-19 infection continue with pathway  Management per [facility] Hospitalist/pulmonary    3-hypertension, was hypotensive on admission, now improved  Continue to monitor, last bp 117/69    4-hyperlipidemia on Lipitor    5-recent stroke versus subdural hematoma as noted in old records, consult Neurology to further assess if using anticoagulation is appropriate      History of Present Illness   Physician Requesting Consult: Kami Jolly MD    Reason for Consult / Principal Problem:     HPI: Santana Francisco is a 65y o  year old male who presents with difficulty walking and hypotension  Patient is a poor historian and has had gradual cognitive worsening over the last couple of weeks after stroke  Entire history was taken by ED provider as well as discussion with the niece over the phone  This history is obtained from the H&P done by [facility] Hospitalist   Patient's sister called EMS as noted to him to be very weak and tired  Upon arrival EMS found him to be hypoxic  Upon arrival to emergency room patient found to have COVID-19 infection  Started immediately on COVID pathway  Patient received convaslascent plasma  This morning patient noted to have tachycardia, heart rates up into the 150s  Patient found to have new onset of atrial fibrillation       Past medical history:  Recent stroke, hypertension, hyperlipidemia, COPD    Inpatient consult to Cardiology  Consult performed by: Fang Carrillo PA-C  Consult ordered by: Yadiel Lee IVET Lindsay          EKG:  Sinus rhythm with PACs      Review of Systems   Unable to perform ROS: Mental status change       Historical Information   Past Medical History:   Diagnosis Date    Abnormality of gait and mobility     JOSHUA (acute kidney injury) ([facility] ) 12/11/2020    GERD (gastroesophageal reflux disease)     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     Other mechanical complication of other specified internal prosthetic devices, implants and grafts, sequela     Recurrent depressive disorder ([facility] )     Wedge compression fracture of t11-T12 vertebra, initial encounter for closed fracture ([facility] )     Wheezing      No past surgical history on file  Social History     Substance and Sexual Activity   Alcohol Use Yes     Social History     Substance and Sexual Activity   Drug Use Yes    Comment: pt states "I take whatever I get a hold of"     Social History     Tobacco Use   Smoking Status Current Every Day Smoker       Family History: No family history on file      Meds/Allergies   all current active meds have been reviewed and current meds:   Current Facility-Administered Medications   Medication Dose Route Frequency    acetaminophen (TYLENOL) tablet 650 mg  650 mg Oral Q6H PRN    ascorbic acid (VITAMIN C) tablet 1,000 mg  1,000 mg Oral Q12H [address]    aspirin tablet 325 mg  325 mg Oral Daily    atorvastatin (LIPITOR) tablet 40 mg  40 mg Oral HS    bisacodyl (DULCOLAX) rectal suppository 10 mg  10 mg Rectal Daily    cholecalciferol (VITAMIN D3) tablet 2,000 Units  2,000 Units Oral Daily    dexamethasone (DECADRON) injection 6 mg  6 mg Intravenous Q24H    diltiazem (CARDIZEM) tablet 30 mg  30 mg Oral Q6H [address]    famotidine (PEPCID) tablet 20 mg  20 mg Oral Daily    gabapentin (NEURONTIN) capsule 600 mg  600 mg Oral TID    heparin (porcine) subcutaneous injection 5,000 Units  5,000 Units Subcutaneous Q8H [address]    insulin lispro (HumaLOG) 100 units/mL subcutaneous injection 1-5 Units 1-5 Units Subcutaneous TID AC    magnesium hydroxide (MILK OF MAGNESIA) oral suspension 30 mL  30 mL Oral Daily PRN    melatonin tablet 3 mg  3 mg Oral HS    metoprolol (LOPRESSOR) injection 2 5 mg  2 5 mg Intravenous Q6H PRN    zinc sulfate (ZINCATE) capsule 220 mg  220 mg Oral Daily    Followed by   Shante Bilberry ON 2020] multivitamin-minerals (CENTRUM ADULTS) tablet 1 tablet  1 tablet Oral Daily    nicotine (NICODERM CQ) 14 mg/24hr TD 24 hr patch 1 patch  1 patch Transdermal Daily    ondansetron (ZOFRAN) injection 4 mg  4 mg Intravenous Q6H PRN    remdesivir (Veklury) 100 mg in sodium chloride 0 9 % 250 mL IVPB  100 mg Intravenous Q24H    traZODone (DESYREL) tablet 25 mg  25 mg Oral HS     No Known Allergies    Objective   Vitals: Blood pressure 117/69, pulse 66, temperature 97 5 °F (36 4 °C), resp  rate 20, height 5' 4" (1 626 m), weight 58 6 kg (129 lb 3 oz), SpO2 98 %  , Body mass index is 22 18 kg/m² ,   Orthostatic Blood Pressures      Most Recent Value   Blood Pressure  117/69 filed at 2020 0605   Patient Position - Orthostatic VS  Lying filed at 2020 1807          Systolic (91QBT), NZ , Min:111 , CUU:102     Diastolic (50XNX), WNF:84, Min:66, Max:74        Intake/Output Summary (Last 24 hours) at 2020 1319  Last data filed at 2020 0900  Gross per 24 hour   Intake 240 ml   Output 2800 ml   Net -2560 ml       Invasive Devices     Peripheral Intravenous Line            Peripheral IV 20 Left;Ventral (anterior) Hand 2 days          Drain            External Urinary Catheter Medium 3 days                    Physical Exam: Pt was spoken to over the phone    Not physically examined, given current COVID infection      Lab Results:     Troponins:   Results from last 7 days   Lab Units 12/10/20  1318   TROPONIN I ng/mL 0 02       CBC with diff:   Results from last 7 days   Lab Units 20  0546 20  0527 20  0534 20  0456 12/10/20  1318   WBC Thousand/uL 7  56 6 64 4 61 2 73* 3 04*   HEMOGLOBIN g/dL 10 9* 10 7* 9 6* 11 0* 10 3*   HEMATOCRIT % 32 4* 31 9* 28 1* 33 1* 30 2*   MCV fL 90 91 91 92 90   PLATELETS Thousands/uL 193 154 142* 122* 131*   MCH pg 30 3 30 4 31 2 30 5 30 8   MCHC g/dL 33 6 33 5 34 2 33 2 34 1   RDW % 15 9* 15 9* 15 9* 16 2* 16 3*   MPV fL 10 4 10 2 10 1 9 7 10 0   NRBC AUTO /100 WBCs 0 0 0  --  0         CMP:   Results from last 7 days   Lab Units 12/13/20  0527 12/12/20  0630 12/11/20  0456 12/10/20  1318   POTASSIUM mmol/L 4 5 4 7 4 7 4 3   CHLORIDE mmol/L 109* 109* 107 105   CO2 mmol/L 22 23 25 22   BUN mg/dL 30* 38* 30* 41*   CREATININE mg/dL 0 96 1 08 1 17 1 41*   CALCIUM mg/dL 8 8 8 5 8 5 8 0*   AST U/L 49* 50* 55* 58*   ALT U/L 35 27 27 27   ALK PHOS U/L 99 84 81 77   EGFR ml/min/1 73sq m 79 68 62 49

## 2020-12-14 NOTE — ASSESSMENT & PLAN NOTE
· Noted to have hypotension on admission  · Significantly improved, stable  · Not maintained on any antihypertensives on review   · Continue to monitor

## 2020-12-14 NOTE — ASSESSMENT & PLAN NOTE
This patient may have had an encephalopathy at the time of his presentation  He would have had multiple reasons for an encephalopathy, specifically his positive COVID status, his new AFib with rapid ventricular response, his a previous strokes and subdural, as well as his age of 65  This time though he appears to be otherwise doing well  Although I did not go into his room he conversed on the phone with me through the class and was able to follow commands, interact somewhat spontaneously etcetera  I suspect that he is likely at his cognitive baseline, his staff nurse reports that he is cognitively intact in terms of orientation and other facets of his hospitalization  But certainly his positive COVID status may certainly still be a factor in his interaction, his neuro fatigue, his memory problem-solving etcetera

## 2020-12-14 NOTE — ASSESSMENT & PLAN NOTE
· Pt initially requiring 5 L NC on admission with positive COVID screen   · Hypoxia continues to worsen, pt saturating 80s on 10 L MFNC, therefore will increase to 15 L [address] today   · Critical care team notified   · Appreciate pulmonary recommendations   · Attempt to keep O2 saturations > 88%

## 2020-12-14 NOTE — ASSESSMENT & PLAN NOTE
· Pt presented with generalized weakness and increased confusion   · COVID (+)   · Initially requiring 5 L NC, now increased to 15 L MFNC, monitor oxygen saturations   · Pulmonary following, appreciate recommendations   · Moderate pathway,   · Continue IV Remdesivir (day 5/5)   · Continue IV Decadron (day 5/10)  · S/p covalescent plasma administration   · Procalcitonin negative x2, IV antibiotics discontinued  · Vit C, Zinc, Vit D supplementation  · Lipitor 40 mg daily   · Pepcid 20 mg daily  · Continue SQ heparin per protocol   · Inflammatory markers slightly improved today, continue to trend   · D-dimer < 2 5

## 2020-12-14 NOTE — ASSESSMENT & PLAN NOTE
· Noted on AM BMP  · Glucose 192 this AM  · Prior hgbA1c 4 9% in July of this year  · Obtain repeat hgbA1c, likely in setting of high dose IV steroid therapy   · Place on low dose PRN SSI coverage   · QID glucose checks  · Consistent carb diet as able, speech therapy consultation for signs of possible dysphagia

## 2020-12-14 NOTE — ASSESSMENT & PLAN NOTE
· Pt noted to have fluctuations in HR overnight on 12/13  · EKG obtained with evidence of A   Fib with RVR  · Place on telemetry monitoring, HR ranging from 120-160s   · New onset, consult cardiology   · Could be in setting of underlying COVID infection   · Place on PRN IV lopressor for HR > 130 BPM, holding parameters due to previous hypotension  · CHADSvasc score of 3, consideration for therapeutic a/c per cardiology, currently on SQ heparin per COVID pathway

## 2020-12-14 NOTE — ASSESSMENT & PLAN NOTE
· 1/2 BC (+) for staph coag negative   · Likely contaminant, additional BC negative x 72 hours   · Continue to monitor
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
## 2020-12-14 NOTE — ASSESSMENT & PLAN NOTE
His new onset AFib with rapid ventricular response was appreciated this morning  He was already been seen by Cardiology and evaluated  Their recommendation is for anticoagulation at this time there are no films available at this time for review

## 2020-12-14 NOTE — ASSESSMENT & PLAN NOTE
He has recently been increased on his oxygen to 10 L of mid flow, he previously was on a yesterday  Denies that he has any shortness of breath this time
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
## 2020-12-14 NOTE — ASSESSMENT & PLAN NOTE
· Not typically maintained on supplemental O2 as an outpatient   · Pulmonary following,  · Continue IV Decadron 6 mg daily (day 5/10)  · Continue PRN albuterol

## 2020-12-14 NOTE — ASSESSMENT & PLAN NOTE
· Pt with recent hx of stroke x a few months ago   · Baseline left sided weakness  · Continue fall precautions  · PT/OT consultations once clinically improved for disposition   · Monitor
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
## 2020-12-14 NOTE — QUICK NOTE
Called to evaluate patient for worsening hypoxia  Now on 12 L midflow nasal cannula  Patient admitted for COVID-19 infection  Upon evaluation, the patient appears comfortable in bed  He denies dyspnea  He is currently 91% on 12 L midflow n/c  Assessment/Plan    Acute hypoxic respiratory failure secondary to COVID-19    COVID Treatment ([facility]):  - Vitamin C/Zinc/Statin: started on 12/10   - Steroids: dexamethasone 6 mg IV x 10 days  - Convalescent Plasma: received   - Remdesivir: started 12/11 for 5 doses    - Actemra: can consider if hypoxia continues to worsen CRP on presentation 229 (now down-trending), ferritin persistently > 600  Check chronic hepatitis panel and rapid HIV 1/2 AB-AG combo prior to administration     -if initiating Actemra please order adequate   Trend CRP, D-dimer, ferritin daily  - Anticoagulation: heparin SQ 5000 q 8 hours, monitor D-dimer, if escalate above 2 5, consider changing to lovenox 1 mg/kg q 12  Encourage self proning or side lying position if able     Patient can remain MS/tele level of care    Please call critical care if patient has respiratory distress or if requiring > 15 L midflow nasal cannula

## 2020-12-14 NOTE — QUICK NOTE
Notified by RN pt without complaints, no cp, but HR staying mostly in 70's but occasionally jumps up to 150  Obtain ekg for now  possibly in setting of hypoxia with COVID infxn  If this continues consider cardio consult  If HR jumps at all above 130 for longer periods of time consider adding metoprolol IV prn

## 2020-12-14 NOTE — ASSESSMENT & PLAN NOTE
· Likely secondary to COVID-19 infection  · Continue to monitor clinically  · Seems to be improving, pt is currently alert, answers questions appropriately

## 2020-12-14 NOTE — CASE MANAGEMENT
Paula Avilez at the [facility] phoned to inform they will accept patient  gallito states patient was in their facility last   CM phoned and spoke to sister-yanet who states she does not want patient to go back to the [facility] because she does not like the way they sent him home from their  Eri Alejandrina states she did not recognize patient when he came home after being at the [facility]  Eri Tinoco would like patient placed in another skilled facility for str  [facility]  CM sent referral and they are reviewing  [place] pending  Treatment team informed

## 2020-12-14 NOTE — ASSESSMENT & PLAN NOTE
This patient has a reported history, from review of his chart, of a right subdural hematoma which apparently may be traumatic status post fall at his home in September of this year after he was discharged for a right PCA infarct territory this summer  His subdural monitored at [facility] crest was not evacuated  His anti-platelet meds were held  He was apparently cleared by their neurosurgery practice at the end of October

## 2020-12-14 NOTE — ASSESSMENT & PLAN NOTE
Apparently throughout the summer when the patient had his 1st stroke and and through his fall his admission to [facility] for the subdural and then his subsequent discharged to [facility] and a subacute rehab after that he apparently has remained COVID negative until he returned this time with a positive test   He is currently being managed by internal medicine as follows:  · Initially requiring 5 L NC, now increased to 10 L [address] [facility], monitor oxygen saturations   · Pulmonary following, appreciate recommendations   · Moderate pathway,   ? Continue IV Remdesivir (day 5/5)   ? Continue IV Decadron (day 5/10)  ? S/p covalescent plasma administration   ? Procalcitonin negative x2, IV antibiotics discontinued  ? Vit C, Zinc, Vit D supplementation  ? Lipitor 40 mg daily   ? Pepcid 20 mg daily  ? Continue SQ heparin per protocol   ? Inflammatory markers slightly improved today, continue to trend   ?  D-dimer < 2 5

## 2020-12-14 NOTE — PROGRESS NOTES
Progress Note - Pulmonary   Cat Peat 65 y o  male MRN: [id number]  Unit/Bed#: -01 Encounter: [id number]    Assessment:  Acute hypoxemic respiratory failure  COVID-19 pneumonia  Recent CVA  New onset AFib    Plan:  Acute hypoxemic respiratory failure secondary to COVID-19 pneumonia  Currently on moderate COVID pathway  Requiring 15 L [address]  No O2 requirement at baseline, titrate to maintain O2 sats greater than 90%  Seen by critical care today for increased O2 requirement  - dexamethasone 6 mg x 10 days  - remdesivir x 5 days  - received convalescent plasma  -consider Actemra if hypoxia continues to worsen  - continue zinc, multivitamin, vitamin-C   - supportive care with incentive spirometer, self proning will be difficult due to recent stroke and left sided weakness  - procalcitonin has been negative x2, can monitor off antibiotics  - inflammatory markers are elevated but improving, D-dimer less than 2 5  Continue on prophylactic dose anticoagulation  Cardiology following    Discussed with SLIM and nursing    Subjective:   Tried calling patient on the phone  No answer  Nursing staff reporting coughing with eating meals  SLP to follow-up  Objective:     Vitals: Blood pressure 119/71, pulse 61, temperature 97 7 °F (36 5 °C), resp  rate 20, height 5' 4" (1 626 m), weight 58 6 kg (129 lb 3 oz), SpO2 94 %  ,Body mass index is 22 18 kg/m²  Intake/Output Summary (Last 24 hours) at 12/14/2020 1532  Last data filed at 12/14/2020 0900  Gross per 24 hour   Intake 240 ml   Output 2800 ml   Net -2560 ml       Invasive Devices     Peripheral Intravenous Line            Peripheral IV 12/12/20 Left;Ventral (anterior) Hand 2 days          Drain            External Urinary Catheter Medium 3 days                Physical Exam:  Done in conjuction with SLIM and nursing  General: Awake & alert  No acute distress  WDWN  Looks comfortable  HEENT: Normocephalic, without obvious abnormality, atraumatic   Managing own secretions  EOM intact  Sclera non-icteric, non-injected  Hearing in tact  Pulm: Not in respiratory distress  Decreased breath sounds  Cardiovascular:  Tachycardia  Musculoskeletal:  No gross deformity  No LE edema  Abdomen:  No significant distention appreciated  Neuro: Residual weakness from CVA  Skin:  Appears to be dry  No jaundice, erythema, or cyanosis appreciated  Psych: No acute psychosis  Labs: I have personally reviewed pertinent lab results  Imaging and other studies: I have personally reviewed pertinent reports

## 2020-12-14 NOTE — PROGRESS NOTES
Progress Note - Kobi Calvin 1950, 65 y o  male MRN: [id number]    Unit/Bed#: -Sebastian Encounter: [id number]    Primary Care Provider: Chuck Martinez MD   Date and time admitted to hospital: 12/10/2020 12:59 PM      DOS: 12/14/2020    * COVID-19 virus infection  Assessment & Plan  · Pt presented with generalized weakness and increased confusion   · COVID (+)   · Initially requiring 5 L NC, now increased to 15 L [address], monitor oxygen saturations   · Pulmonary following, appreciate recommendations   · Moderate pathway,   · Continue IV Remdesivir (day 5/5)   · Continue IV Decadron (day 5/10)  · S/p covalescent plasma administration   · Procalcitonin negative x2, IV antibiotics discontinued  · Vit C, Zinc, Vit D supplementation  · Lipitor 40 mg daily   · Pepcid 20 mg daily  · Continue SQ heparin per protocol   · Inflammatory markers slightly improved today, continue to trend   · D-dimer < 2 5    New onset atrial fibrillation ([facility] [address] )  Assessment & Plan  · Pt noted to have fluctuations in HR overnight on 12/13  · EKG obtained with evidence of A   Fib with RVR  · Place on telemetry monitoring, HR ranging from 120-160s   · New onset, consult cardiology   · Could be in setting of underlying COVID infection   · Place on PRN IV lopressor for HR > 130 BPM, holding parameters due to previous hypotension  · CHADSvasc score of 3, consideration for therapeutic a/c per cardiology, currently on SQ heparin per COVID pathway      Hyperglycemia  Assessment & Plan  · Noted on AM BMP  · Glucose 192 this AM  · Prior hgbA1c 4 9% in July of this year  · Obtain repeat hgbA1c, likely in setting of high dose IV steroid therapy   · Place on low dose PRN SSI coverage   · QID glucose checks  · Consistent carb diet as able, speech therapy consultation for signs of possible dysphagia     Positive blood culture  Assessment & Plan  · 1/2 BC (+) for staph coag negative   · Likely contaminant, additional BC negative x 72 hours   · Continue to monitor     Acute metabolic encephalopathy  Assessment & Plan  · Likely secondary to COVID-19 infection  · Continue to monitor clinically  · Seems to be improving, pt is currently alert, answers questions appropriately    Acute respiratory failure with hypoxia (HCC)  Assessment & Plan  · Pt initially requiring 5 L NC on admission with positive COVID screen   · Hypoxia continues to worsen, pt saturating 80s on 10 L MFNC, therefore will increase to 15 L MFNC today   · Critical care team notified   · Appreciate pulmonary recommendations   · Attempt to keep O2 saturations > 88%    COPD (chronic obstructive pulmonary disease) ([facility] [address] )  Assessment & Plan  · Not typically maintained on supplemental O2 as an outpatient   · Pulmonary following,  · Continue IV Decadron 6 mg daily (day 5/10)  · Continue PRN albuterol     CVA (cerebral vascular accident) ([facility] [address] )  Assessment & Plan  · Pt with recent hx of stroke x a few months ago   · Baseline left sided weakness  · Continue fall precautions  · PT/OT consultations once clinically improved for disposition   · Monitor     Essential hypertension  Assessment & Plan  · Noted to have hypotension on admission  · Significantly improved, stable  · Not maintained on any antihypertensives on review   · Continue to monitor     Other hyperlipidemia  Assessment & Plan  · Will change statin to atorvastatin 40 mg due to COVID infection    VTE Pharmacologic Prophylaxis:   Pharmacologic: Heparin  Mechanical VTE Prophylaxis in Place: Yes    Patient Centered Rounds: I have evaluated patient without nursing staff present due to speaking to nurse outside patient's room secondary to COVID precautions     Discussions with Specialists or Other Care Team Provider: Discussed with ICU, pulmonary, RN, CM and reviewed previous notes     Education and Discussions with Family / Patient: Discussed with patient at bedside regarding plan of care, when asked to update friends or family members patient politely declined  Time Spent for Care: 30 minutes  More than 50% of total time spent on counseling and coordination of care as described above  Current Length of Stay: 4 day(s)    Current Patient Status: Inpatient   Certification Statement: The patient will continue to require additional inpatient hospital stay due to pt on 10 L [address], IV Decadron, IV remdesivir for treatment of COVID    Discharge Plan: Not medically stable as above, likely not for another 48-72 hours pending improvement of oxygen saturations     Code Status: Level 2 - DNAR: but accepts endotracheal intubation      Subjective:   Pt reports that he feels okay  Denies any shortness of breath, chest pain, abdominal pain, nausea, vomiting, diarrhea  Nursing staff reported pt with coughing when attempting to eat meals  Therefore will obtain speech therapy consultation  Pt also reports baseline left sided weakness from prior strokes  Objective:     Vitals:   Temp (24hrs), Av 6 °F (36 4 °C), Min:97 4 °F (36 3 °C), Max:97 9 °F (36 6 °C)    Temp:  [97 4 °F (36 3 °C)-97 9 °F (36 6 °C)] 97 5 °F (36 4 °C)  HR:  [] 76  Resp:  [20] 20  BP: (111-122)/(66-74) 117/69  SpO2:  [86 %-96 %] 87 %  Body mass index is 22 18 kg/m²  Input and Output Summary (last 24 hours): Intake/Output Summary (Last 24 hours) at 2020 1118  Last data filed at 2020 0900  Gross per 24 hour   Intake 240 ml   Output 2800 ml   Net -2560 ml       Physical Exam:     Physical Exam  Vitals signs reviewed  Constitutional:       General: He is not in acute distress  Appearance: He is ill-appearing  He is not toxic-appearing  Comments: Pt is in no acute distress lying in his hospital bed resting comfortably  HENT:      Head: Normocephalic and atraumatic  Eyes:      Conjunctiva/sclera: Conjunctivae normal       Pupils: Pupils are equal, round, and reactive to light  Cardiovascular:      Rate and Rhythm: Tachycardia present  Rhythm irregular  Pulses: Normal pulses  Pulmonary:      Effort: Pulmonary effort is normal  No respiratory distress  Breath sounds: No wheezing or rales  Comments: Decreased breath sounds bilaterally   Abdominal:      General: Bowel sounds are normal  There is no distension  Palpations: Abdomen is soft  Tenderness: There is no abdominal tenderness  There is no guarding  Musculoskeletal:         General: No swelling  Right lower leg: No edema  Left lower leg: No edema  Skin:     General: Skin is warm and dry  Findings: No erythema  Neurological:      Mental Status: He is alert  Psychiatric:         Mood and Affect: Mood normal          Additional Data:     Labs:    Results from last 7 days   Lab Units 12/14/20  0546   WBC Thousand/uL 7 56   HEMOGLOBIN g/dL 10 9*   HEMATOCRIT % 32 4*   PLATELETS Thousands/uL 193   NEUTROS PCT % 87*   LYMPHS PCT % 6*   MONOS PCT % 5   EOS PCT % 0     Results from last 7 days   Lab Units 12/13/20  0527   POTASSIUM mmol/L 4 5   CHLORIDE mmol/L 109*   CO2 mmol/L 22   BUN mg/dL 30*   CREATININE mg/dL 0 96   CALCIUM mg/dL 8 8   ALK PHOS U/L 99   ALT U/L 35   AST U/L 49*     Results from last 7 days   Lab Units 12/10/20  1318   INR  0 98       * I Have Reviewed All Lab Data Listed Above  * Additional Pertinent Lab Tests Reviewed: All Labs Within Last 24 Hours Reviewed    Imaging:    Imaging Reports Reviewed Today Include: CXR  Imaging Personally Reviewed by Myself Includes:  None    Recent Cultures (last 7 days):     Results from last 7 days   Lab Units 12/10/20  1318   BLOOD CULTURE  No Growth at 72 hrs    Staphylococcus coagulase negative*   GRAM STAIN RESULT  Gram positive cocci in clusters*       Last 24 Hours Medication List:   Current Facility-Administered Medications   Medication Dose Route Frequency Provider Last Rate    acetaminophen  650 mg Oral Q6H PRN Rogelio Oliveira MD      ascorbic acid  1,000 mg Oral Q12H Eitan Burrows MD      aspirin 325 mg Oral Daily Mimi Naidu MD      atorvastatin  40 mg Oral HS Mimi Naidu MD      bisacodyl  10 mg Rectal Daily Mimi Naidu MD      cholecalciferol  2,000 Units Oral Daily Mimi Naidu MD      dexamethasone  6 mg Intravenous Q24H Mimi Naidu MD      famotidine  20 mg Oral Daily Mimi Naidu MD      gabapentin  600 mg Oral TID Mimi Naidu MD      heparin (porcine)  5,000 Units Subcutaneous Q8H Tali Mohan MD      insulin lispro  1-5 Units Subcutaneous TID AC Shannon Lindsay PA-C      magnesium hydroxide  30 mL Oral Daily PRN Mimi Naidu MD      melatonin  3 mg Oral HS Mimi Naidu MD      metoprolol  2 5 mg Intravenous Q6H PRN Jane Rosa PA-C      zinc sulfate  220 mg Oral Daily Mimi Naidu MD      Followed by   Yury Thomas ON 12/18/2020] multivitamin-minerals  1 tablet Oral Daily Mimi Naidu MD      nicotine  1 patch Transdermal Daily Mimi Naidu MD      ondansetron  4 mg Intravenous Q6H PRN Mimi Naidu MD      remdesivir  100 mg Intravenous Q24H Mimi Naidu MD      traZODone  25 mg Oral HS Mimi Naidu MD          Today, Patient Was Seen By: Jane Rosa PA-C    ** Please Note: Dictation voice to text software may have been used in the creation of this document   **

## 2020-12-15 LAB
ALBUMIN SERPL BCP-MCNC: 2.3 G/DL (ref 3.5–5)
ALP SERPL-CCNC: 121 U/L (ref 46–116)
ALT SERPL W P-5'-P-CCNC: 45 U/L (ref 12–78)
ANION GAP SERPL CALCULATED.3IONS-SCNC: 11 MMOL/L (ref 4–13)
AST SERPL W P-5'-P-CCNC: 32 U/L (ref 5–45)
BACTERIA BLD CULT: NORMAL
BILIRUB SERPL-MCNC: 0.4 MG/DL (ref 0.2–1)
BUN SERPL-MCNC: 29 MG/DL (ref 5–25)
CALCIUM ALBUM COR SERPL-MCNC: 10.1 MG/DL (ref 8.3–10.1)
CALCIUM SERPL-MCNC: 8.7 MG/DL (ref 8.3–10.1)
CHLORIDE SERPL-SCNC: 104 MMOL/L (ref 100–108)
CO2 SERPL-SCNC: 22 MMOL/L (ref 21–32)
CREAT SERPL-MCNC: 1.02 MG/DL (ref 0.6–1.3)
CRP SERPL QL: 35.6 MG/L
D DIMER PPP FEU-MCNC: 2.37 UG/ML FEU
ERYTHROCYTE [DISTWIDTH] IN BLOOD BY AUTOMATED COUNT: 15.9 % (ref 11.6–15.1)
FERRITIN SERPL-MCNC: 1305 NG/ML (ref 8–388)
GFR SERPL CREATININE-BSD FRML MDRD: 73 ML/MIN/1.73SQ M
GLUCOSE SERPL-MCNC: 260 MG/DL (ref 65–140)
GLUCOSE SERPL-MCNC: 262 MG/DL (ref 65–140)
GLUCOSE SERPL-MCNC: 326 MG/DL (ref 65–140)
GLUCOSE SERPL-MCNC: 338 MG/DL (ref 65–140)
GLUCOSE SERPL-MCNC: 397 MG/DL (ref 65–140)
GLUCOSE SERPL-MCNC: 411 MG/DL (ref 65–140)
HCT VFR BLD AUTO: 33.2 % (ref 36.5–49.3)
HGB BLD-MCNC: 11.3 G/DL (ref 12–17)
MCH RBC QN AUTO: 30.3 PG (ref 26.8–34.3)
MCHC RBC AUTO-ENTMCNC: 34 G/DL (ref 31.4–37.4)
MCV RBC AUTO: 89 FL (ref 82–98)
PLATELET # BLD AUTO: 197 THOUSANDS/UL (ref 149–390)
PMV BLD AUTO: 10.2 FL (ref 8.9–12.7)
POTASSIUM SERPL-SCNC: 4.7 MMOL/L (ref 3.5–5.3)
PROCALCITONIN SERPL-MCNC: <0.05 NG/ML
PROT SERPL-MCNC: 6.6 G/DL (ref 6.4–8.2)
RBC # BLD AUTO: 3.73 MILLION/UL (ref 3.88–5.62)
SODIUM SERPL-SCNC: 137 MMOL/L (ref 136–145)
T4 FREE SERPL-MCNC: 1.8 NG/DL (ref 0.76–1.46)
WBC # BLD AUTO: 6.6 THOUSAND/UL (ref 4.31–10.16)

## 2020-12-15 PROCEDURE — 82948 REAGENT STRIP/BLOOD GLUCOSE: CPT

## 2020-12-15 PROCEDURE — 94660 CPAP INITIATION&MGMT: CPT

## 2020-12-15 PROCEDURE — 94760 N-INVAS EAR/PLS OXIMETRY 1: CPT

## 2020-12-15 PROCEDURE — G2012 BRIEF CHECK IN BY MD/QHP: HCPCS | Performed by: INTERNAL MEDICINE

## 2020-12-15 PROCEDURE — 80053 COMPREHEN METABOLIC PANEL: CPT | Performed by: PHYSICIAN ASSISTANT

## 2020-12-15 PROCEDURE — 85027 COMPLETE CBC AUTOMATED: CPT | Performed by: PHYSICIAN ASSISTANT

## 2020-12-15 PROCEDURE — 99232 SBSQ HOSP IP/OBS MODERATE 35: CPT | Performed by: PHYSICIAN ASSISTANT

## 2020-12-15 PROCEDURE — 85379 FIBRIN DEGRADATION QUANT: CPT | Performed by: PHYSICIAN ASSISTANT

## 2020-12-15 PROCEDURE — 84145 PROCALCITONIN (PCT): CPT | Performed by: NURSE PRACTITIONER

## 2020-12-15 PROCEDURE — 82728 ASSAY OF FERRITIN: CPT | Performed by: PHYSICIAN ASSISTANT

## 2020-12-15 PROCEDURE — 99232 SBSQ HOSP IP/OBS MODERATE 35: CPT | Performed by: INTERNAL MEDICINE

## 2020-12-15 PROCEDURE — 86140 C-REACTIVE PROTEIN: CPT | Performed by: PHYSICIAN ASSISTANT

## 2020-12-15 RX ORDER — INSULIN GLARGINE 100 [IU]/ML
8 INJECTION, SOLUTION SUBCUTANEOUS
Status: DISCONTINUED | OUTPATIENT
Start: 2020-12-15 | End: 2020-12-17

## 2020-12-15 RX ADMIN — Medication 2000 UNITS: at 09:02

## 2020-12-15 RX ADMIN — INSULIN LISPRO 6 UNITS: 100 INJECTION, SOLUTION INTRAVENOUS; SUBCUTANEOUS at 11:39

## 2020-12-15 RX ADMIN — DILTIAZEM HYDROCHLORIDE 30 MG: 30 TABLET, FILM COATED ORAL at 11:35

## 2020-12-15 RX ADMIN — Medication 1 PATCH: at 09:05

## 2020-12-15 RX ADMIN — INSULIN GLARGINE 8 UNITS: 100 INJECTION, SOLUTION SUBCUTANEOUS at 21:20

## 2020-12-15 RX ADMIN — GABAPENTIN 600 MG: 300 CAPSULE ORAL at 15:41

## 2020-12-15 RX ADMIN — DILTIAZEM HYDROCHLORIDE 30 MG: 30 TABLET, FILM COATED ORAL at 18:55

## 2020-12-15 RX ADMIN — ZINC SULFATE 220 MG (50 MG) CAPSULE 220 MG: CAPSULE at 09:02

## 2020-12-15 RX ADMIN — DEXAMETHASONE SODIUM PHOSPHATE 6 MG: 4 INJECTION, SOLUTION INTRAMUSCULAR; INTRAVENOUS at 18:55

## 2020-12-15 RX ADMIN — INSULIN LISPRO 3 UNITS: 100 INJECTION, SOLUTION INTRAVENOUS; SUBCUTANEOUS at 07:40

## 2020-12-15 RX ADMIN — FAMOTIDINE 20 MG: 20 TABLET ORAL at 09:02

## 2020-12-15 RX ADMIN — ATORVASTATIN CALCIUM 40 MG: 40 TABLET, FILM COATED ORAL at 21:21

## 2020-12-15 RX ADMIN — MELATONIN 3 MG: at 21:21

## 2020-12-15 RX ADMIN — GABAPENTIN 600 MG: 300 CAPSULE ORAL at 09:02

## 2020-12-15 RX ADMIN — HEPARIN SODIUM 5000 UNITS: 5000 INJECTION INTRAVENOUS; SUBCUTANEOUS at 06:22

## 2020-12-15 RX ADMIN — HEPARIN SODIUM 5000 UNITS: 5000 INJECTION INTRAVENOUS; SUBCUTANEOUS at 14:40

## 2020-12-15 RX ADMIN — OXYCODONE HYDROCHLORIDE AND ACETAMINOPHEN 1000 MG: 500 TABLET ORAL at 21:21

## 2020-12-15 RX ADMIN — INSULIN LISPRO 3 UNITS: 100 INJECTION, SOLUTION INTRAVENOUS; SUBCUTANEOUS at 21:25

## 2020-12-15 RX ADMIN — ASPIRIN 325 MG ORAL TABLET 325 MG: 325 PILL ORAL at 09:02

## 2020-12-15 RX ADMIN — GABAPENTIN 600 MG: 300 CAPSULE ORAL at 21:21

## 2020-12-15 RX ADMIN — OXYCODONE HYDROCHLORIDE AND ACETAMINOPHEN 1000 MG: 500 TABLET ORAL at 09:02

## 2020-12-15 RX ADMIN — APIXABAN 5 MG: 5 TABLET, FILM COATED ORAL at 21:21

## 2020-12-15 RX ADMIN — ACETAMINOPHEN 650 MG: 325 TABLET, FILM COATED ORAL at 11:35

## 2020-12-15 RX ADMIN — TRAZODONE HYDROCHLORIDE 25 MG: 50 TABLET ORAL at 21:21

## 2020-12-15 RX ADMIN — INSULIN LISPRO 6 UNITS: 100 INJECTION, SOLUTION INTRAVENOUS; SUBCUTANEOUS at 16:45

## 2020-12-15 NOTE — PROGRESS NOTES
Progress Note - Pulmonary   Kobi Calvin 65 y o  male MRN: [id number]  Unit/Bed#: -01 Encounter: [id number]    Assessment:  Acute hypoxemic respiratory failure  COVID-19 pneumonia  Recent CVA  New onset AFib    Plan:  Acute hypoxemic respiratory failure secondary to COVID-19 pneumonia  Currently on moderate COVID pathway   Requiring 10 L MFNC   No O2 requirement at baseline, titrate to maintain O2 sats greater than 90%  Seen by critical care yesterday  - dexamethasone 6 mg x 10 days  - remdesivir x 5 days  - received convalescent plasma  -consider Actemra if hypoxia worsens  - continue zinc, multivitamin, vitamin-C   - supportive care with incentive spirometer, self proning will be difficult due to recent stroke and left sided weakness   - procalcitonin has been negative x2, can monitor off antibiotics  - inflammatory markers are elevated but improving, D-dimer less than 2 5   Continue on prophylactic dose anticoagulation  Cardiology following     Discussed with SLIM    Subjective:   Spoke with patient on the phone  No new complaints  Denies SOB  Reports some dry cough  No chest pain  12 point review of systems otherwise negative  Objective:     Vitals: Blood pressure 116/61, pulse 70, temperature (!) 97 1 °F (36 2 °C), resp  rate 18, height 5' 4" (1 626 m), weight 58 6 kg (129 lb 3 oz), SpO2 97 %  ,Body mass index is 22 18 kg/m²  Intake/Output Summary (Last 24 hours) at 12/15/2020 1148  Last data filed at 12/15/2020 7619  Gross per 24 hour   Intake 240 ml   Output 1600 ml   Net -1360 ml       Invasive Devices     Peripheral Intravenous Line            Peripheral IV 12/12/20 Left;Ventral (anterior) Hand 3 days          Drain            External Urinary Catheter Medium 3 days                Physical Exam:   Examined through the glass for infection control  General: Awake and alert  No acute distress  WDWN  Looks comfortable  HEENT: Normocephalic, without obvious abnormality, atraumatic  EOM intact  Sclera non-icteric, non-injected  Hearing in tact  Pulm: Non-labored breathing  No respiratory distress  Cardiovascular:  Normal rate  No JVD or edema appreciated  Musculoskeletal:  No gross deformity  Abdomen:  No significant distention appreciated  Neuro:  No aphasia  No facial asymmetry  No acute deficit noted  Skin:  Appears to be dry  No jaundice, erythema, or cyanosis appreciated  Psych:  Cooperative, appropriate situational mood  No acute psychosis  Labs: I have personally reviewed pertinent lab results  Imaging and other studies: I have personally reviewed pertinent reports

## 2020-12-15 NOTE — PLAN OF CARE
Problem: Potential for Falls  Goal: Patient will remain free of falls  Description: INTERVENTIONS:  - Assess patient frequently for physical needs  -  Identify cognitive and physical deficits and behaviors that affect risk of falls    -  [place] fall precautions as indicated by assessment   - Educate patient/family on patient safety including physical limitations  - Instruct patient to call for assistance with activity based on assessment  - Modify environment to reduce risk of injury  - Consider OT/PT consult to assist with strengthening/mobility  Outcome: Progressing     Problem: PAIN - ADULT  Goal: Verbalizes/displays adequate comfort level or baseline comfort level  Description: Interventions:  - Encourage patient to monitor pain and request assistance  - Assess pain using appropriate pain scale  - Administer analgesics based on type and severity of pain and evaluate response  - Implement non-pharmacological measures as appropriate and evaluate response  - Consider cultural and social influences on pain and pain management  - Notify physician/advanced practitioner if interventions unsuccessful or patient reports new pain  Outcome: Progressing     Problem: INFECTION - ADULT  Goal: Absence or prevention of progression during hospitalization  Description: INTERVENTIONS:  - Assess and monitor for signs and symptoms of infection  - Monitor lab/diagnostic results  - Monitor all insertion sites, i e  indwelling lines, tubes, and drains  - Monitor endotracheal if appropriate and nasal secretions for changes in amount and color  - [place] appropriate cooling/warming therapies per order  - Administer medications as ordered  - Instruct and encourage patient and family to use good hand hygiene technique  - Identify and instruct in appropriate isolation precautions for identified infection/condition  Outcome: Progressing  Goal: Absence of fever/infection during neutropenic period  Description: INTERVENTIONS:  - Monitor WBC    Outcome: Progressing     Problem: SAFETY ADULT  Goal: Patient will remain free of falls  Description: INTERVENTIONS:  - Assess patient frequently for physical needs  -  Identify cognitive and physical deficits and behaviors that affect risk of falls    -  [place] fall precautions as indicated by assessment   - Educate patient/family on patient safety including physical limitations  - Instruct patient to call for assistance with activity based on assessment  - Modify environment to reduce risk of injury  - Consider OT/PT consult to assist with strengthening/mobility  Outcome: Progressing  Goal: Maintain or return to baseline ADL function  Description: INTERVENTIONS:  -  Assess patient's ability to carry out ADLs; assess patient's baseline for ADL function and identify physical deficits which impact ability to perform ADLs (bathing, care of mouth/teeth, toileting, grooming, dressing, etc )  - Assess/evaluate cause of self-care deficits   - Assess range of motion  - Assess patient's mobility; develop plan if impaired  - Assess patient's need for assistive devices and provide as appropriate  - Encourage maximum independence but intervene and supervise when necessary  - Involve family in performance of ADLs  - Assess for home care needs following discharge   - Consider OT consult to assist with ADL evaluation and planning for discharge  - Provide patient education as appropriate  Outcome: Progressing  Goal: Maintain or return mobility status to optimal level  Description: INTERVENTIONS:  - Assess patient's baseline mobility status (ambulation, transfers, stairs, etc )    - Identify cognitive and physical deficits and behaviors that affect mobility  - Identify mobility aids required to assist with transfers and/or ambulation (gait belt, sit-to-stand, lift, walker, cane, etc )  - [place] fall precautions as indicated by assessment  - Record patient progress and toleration of activity level on Mobility SBAR; progress patient to next Phase/Stage  - Instruct patient to call for assistance with activity based on assessment  - Consider rehabilitation consult to assist with strengthening/weightbearing, etc   Outcome: Progressing     Problem: DISCHARGE PLANNING  Goal: Discharge to home or other facility with appropriate resources  Description: INTERVENTIONS:  - Identify barriers to discharge w/patient and caregiver  - Arrange for needed discharge resources and transportation as appropriate  - Identify discharge learning needs (meds, wound care, etc )  - Arrange for interpretive services to assist at discharge as needed  - Refer to Case Management Department for coordinating discharge planning if the patient needs post-hospital services based on physician/advanced practitioner order or complex needs related to functional status, cognitive ability, or social support system  Outcome: Progressing     Problem: Knowledge Deficit  Goal: Patient/family/caregiver demonstrates understanding of disease process, treatment plan, medications, and discharge instructions  Description: Complete learning assessment and assess knowledge base    Interventions:  - Provide teaching at level of understanding  - Provide teaching via preferred learning methods  Outcome: Progressing     Problem: Prexisting or High Potential for Compromised Skin Integrity  Goal: Skin integrity is maintained or improved  Description: INTERVENTIONS:  - Identify patients at risk for skin breakdown  - Assess and monitor skin integrity  - Assess and monitor nutrition and hydration status  - Monitor labs   - Assess for incontinence   - Turn and reposition patient  - Assist with mobility/ambulation  - Relieve pressure over bony prominences  - Avoid friction and shearing  - Provide appropriate hygiene as needed including keeping skin clean and dry  - Evaluate need for skin moisturizer/barrier cream  - Collaborate with interdisciplinary team   - Patient/family teaching  - Consider wound care consult   Outcome: Progressing

## 2020-12-15 NOTE — PROGRESS NOTES
Cardiology Progress Note - Georgia Rinaldi 65 y o  male MRN: [id number]    Unit/Bed#: -01 Encounter: [id number]      Assessment/Plan:  1-new onset of atrial fibrillation with RVR, heart rates improved 90s to 100s, continue with Cardizem  TSH 0 34  Anticoagulation to be decided  Neurology asked for input from Neurosurgery  History of stroke and subdural hematoma    2-COVID-19 infection, continue with pathway  Management per Aleisha Guzmán Internal Medicine Hospitalist and pulmonary    3-hypertension, stable  Last blood pressure 106/66  Continue all medications    4-hyperlipidemia on Lipitor    5-history of stroke versus subdural hematoma, Neurology following  Neuro surgery pending about using anticoagulation      Subjective:   Patient seen and examined  No significant events overnight  I am feeling okay    Objective:     Vitals: Blood pressure 106/66, pulse 75, temperature (!) 97 1 °F (36 2 °C), resp  rate 21, height 5' 4" (1 626 m), weight 58 6 kg (129 lb 3 oz), SpO2 (!) 82 % , Body mass index is 22 18 kg/m² ,   Orthostatic Blood Pressures      Most Recent Value   Blood Pressure  106/66 filed at 12/15/2020 1547   Patient Position - Orthostatic VS  Lying filed at 12/12/2020 2225            Intake/Output Summary (Last 24 hours) at 12/15/2020 1707  Last data filed at 12/15/2020 0875  Gross per 24 hour   Intake 240 ml   Output 1600 ml   Net -1360 ml         Physical Exam:  Pt spoken to over the phone, given current COVID infection         Medications:      Current Facility-Administered Medications:     acetaminophen (TYLENOL) tablet 650 mg, 650 mg, Oral, Q6H PRN, Cisco Kenny MD, 650 mg at 12/15/20 1135    ascorbic acid (VITAMIN C) tablet 1,000 mg, 1,000 mg, Oral, Q12H [address], Cisco Kenny MD, 1,000 mg at 12/15/20 0902    aspirin tablet 325 mg, 325 mg, Oral, Daily, Cisco Kenny MD, 325 mg at 12/15/20 0902    atorvastatin (LIPITOR) tablet 40 mg, 40 mg, Oral, HS, Cisco Kenny MD, 40 mg at 12/14/20 2146    bisacodyl (DULCOLAX) rectal suppository 10 mg, 10 mg, Rectal, Daily, Herbie Sheriff MD, 10 mg at 12/11/20 1000    cholecalciferol (VITAMIN D3) tablet 2,000 Units, 2,000 Units, Oral, Daily, Herbie Sheriff MD, 2,000 Units at 12/15/20 0902    dexamethasone (DECADRON) injection 6 mg, 6 mg, Intravenous, Q24H, Herbie Sheriff MD, 6 mg at 12/14/20 1838    diltiazem (CARDIZEM) tablet 30 mg, 30 mg, Oral, Q6H [address], Volodymyr Narvaez PA-C, 30 mg at 12/15/20 1135    famotidine (PEPCID) tablet 20 mg, 20 mg, Oral, Daily, Herbie Sheriff MD, 20 mg at 12/15/20 0902    gabapentin (NEURONTIN) capsule 600 mg, 600 mg, Oral, TID, Herbie Sheriff MD, 600 mg at 12/15/20 1541    heparin (porcine) subcutaneous injection 5,000 Units, 5,000 Units, Subcutaneous, Q8H [address], Herbie Sheriff MD, 5,000 Units at 12/15/20 1440    insulin glargine (LANTUS) subcutaneous injection 5 Units 0 05 mL, 5 Units, Subcutaneous, HS, Shannon Lindsay PA-C, 5 Units at 12/14/20 2151    insulin lispro (HumaLOG) 100 units/mL subcutaneous injection 1-5 Units, 1-5 Units, Subcutaneous, HS, Shannon Lindsay PA-C, 2 Units at 12/14/20 2152    insulin lispro (HumaLOG) 100 units/mL subcutaneous injection 1-6 Units, 1-6 Units, Subcutaneous, TID AC, 6 Units at 12/15/20 1139 **AND** Fingerstick Glucose (POCT), , , TID AC, Shannon Lindsay PA-C    magnesium hydroxide (MILK OF MAGNESIA) oral suspension 30 mL, 30 mL, Oral, Daily PRN, Herbie Sheriff MD    melatonin tablet 3 mg, 3 mg, Oral, HS, Herbie Sheriff MD, 3 mg at 12/14/20 2145    metoprolol (LOPRESSOR) injection 2 5 mg, 2 5 mg, Intravenous, Q6H PRN, Xiomara Ashby PA-C    zinc sulfate (ZINCATE) capsule 220 mg, 220 mg, Oral, Daily, 220 mg at 12/15/20 0902 **FOLLOWED BY** [START ON 12/18/2020] multivitamin-minerals (CENTRUM ADULTS) tablet 1 tablet, 1 tablet, Oral, Daily, Irasema Mckinney MD    nicotine (NICODERM CQ) 14 mg/24hr TD 24 hr patch 1 patch, 1 patch, Transdermal, Daily, Simran Mays MD, 1 patch at 12/15/20 0905    ondansetron (ZOFRAN) injection 4 mg, 4 mg, Intravenous, Q6H PRN, Simran Mays MD    traZODone (DESYREL) tablet 25 mg, 25 mg, Oral, HS, Simran Mays MD, 25 mg at 12/14/20 2146     Labs & Results:    Results from last 7 days   Lab Units 12/10/20  1318   TROPONIN I ng/mL 0 02     Results from last 7 days   Lab Units 12/15/20  0440 12/14/20  0546 12/13/20  0527   WBC Thousand/uL 6 60 7 56 6 64   HEMOGLOBIN g/dL 11 3* 10 9* 10 7*   HEMATOCRIT % 33 2* 32 4* 31 9*   PLATELETS Thousands/uL 197 193 154         Results from last 7 days   Lab Units 12/15/20  0440 12/13/20  0527 12/12/20  0630   POTASSIUM mmol/L 4 7 4 5 4 7   CHLORIDE mmol/L 104 109* 109*   CO2 mmol/L 22 22 23   BUN mg/dL 29* 30* 38*   CREATININE mg/dL 1 02 0 96 1 08   CALCIUM mg/dL 8 7 8 8 8 5   ALK PHOS U/L 121* 99 84   ALT U/L 45 35 27   AST U/L 32 49* 50*     Results from last 7 days   Lab Units 12/10/20  1318   INR  0 98   PTT seconds 33     Results from last 7 days   Lab Units 12/10/20  1318   MAGNESIUM mg/dL 2 1

## 2020-12-15 NOTE — SPEECH THERAPY NOTE
St consult requested given PCA report of coughing with food  Upon my arrival today RN and x2 PCAs deny any difficulty with food  PCA stated cough x1 with coffee however per RN and PCAs no additional coughing with thin liquids or with meds  RN stated he has had a baseline cough given COVID but no dysphagia noted  Discussed with ordering PA- will d/c orders at this time       JULIETTE Martini S , [id number] [place] [address]  Speech Language Pathologist   Available via [address] [place] [address] #[id number]  [place] #[id number]

## 2020-12-16 LAB
ALBUMIN SERPL BCP-MCNC: 2.2 G/DL (ref 3.5–5)
ALP SERPL-CCNC: 119 U/L (ref 46–116)
ALT SERPL W P-5'-P-CCNC: 41 U/L (ref 12–78)
ANION GAP SERPL CALCULATED.3IONS-SCNC: 11 MMOL/L (ref 4–13)
AST SERPL W P-5'-P-CCNC: 24 U/L (ref 5–45)
BILIRUB SERPL-MCNC: 0.3 MG/DL (ref 0.2–1)
BUN SERPL-MCNC: 37 MG/DL (ref 5–25)
CALCIUM ALBUM COR SERPL-MCNC: 9.8 MG/DL (ref 8.3–10.1)
CALCIUM SERPL-MCNC: 8.4 MG/DL (ref 8.3–10.1)
CHLORIDE SERPL-SCNC: 101 MMOL/L (ref 100–108)
CO2 SERPL-SCNC: 22 MMOL/L (ref 21–32)
CREAT SERPL-MCNC: 1.04 MG/DL (ref 0.6–1.3)
D DIMER PPP FEU-MCNC: 1.38 UG/ML FEU
ERYTHROCYTE [DISTWIDTH] IN BLOOD BY AUTOMATED COUNT: 16 % (ref 11.6–15.1)
GFR SERPL CREATININE-BSD FRML MDRD: 71 ML/MIN/1.73SQ M
GLUCOSE SERPL-MCNC: 231 MG/DL (ref 65–140)
GLUCOSE SERPL-MCNC: 369 MG/DL (ref 65–140)
GLUCOSE SERPL-MCNC: 389 MG/DL (ref 65–140)
GLUCOSE SERPL-MCNC: 416 MG/DL (ref 65–140)
GLUCOSE SERPL-MCNC: 467 MG/DL (ref 65–140)
HCT VFR BLD AUTO: 33.1 % (ref 36.5–49.3)
HGB BLD-MCNC: 11 G/DL (ref 12–17)
IL6 SERPL-MCNC: 84.5 PG/ML (ref 0–13)
MCH RBC QN AUTO: 30.2 PG (ref 26.8–34.3)
MCHC RBC AUTO-ENTMCNC: 33.2 G/DL (ref 31.4–37.4)
MCV RBC AUTO: 91 FL (ref 82–98)
PLATELET # BLD AUTO: 201 THOUSANDS/UL (ref 149–390)
PMV BLD AUTO: 10.4 FL (ref 8.9–12.7)
POTASSIUM SERPL-SCNC: 4.7 MMOL/L (ref 3.5–5.3)
PROT SERPL-MCNC: 6.3 G/DL (ref 6.4–8.2)
RBC # BLD AUTO: 3.64 MILLION/UL (ref 3.88–5.62)
SODIUM SERPL-SCNC: 134 MMOL/L (ref 136–145)
T3FREE SERPL-MCNC: 0.68 PG/ML (ref 2.3–4.2)
WBC # BLD AUTO: 5.35 THOUSAND/UL (ref 4.31–10.16)

## 2020-12-16 PROCEDURE — 94660 CPAP INITIATION&MGMT: CPT

## 2020-12-16 PROCEDURE — 99232 SBSQ HOSP IP/OBS MODERATE 35: CPT | Performed by: INTERNAL MEDICINE

## 2020-12-16 PROCEDURE — 85379 FIBRIN DEGRADATION QUANT: CPT | Performed by: PHYSICIAN ASSISTANT

## 2020-12-16 PROCEDURE — 84481 FREE ASSAY (FT-3): CPT | Performed by: PHYSICIAN ASSISTANT

## 2020-12-16 PROCEDURE — 99232 SBSQ HOSP IP/OBS MODERATE 35: CPT | Performed by: PHYSICIAN ASSISTANT

## 2020-12-16 PROCEDURE — 80053 COMPREHEN METABOLIC PANEL: CPT | Performed by: PHYSICIAN ASSISTANT

## 2020-12-16 PROCEDURE — 82948 REAGENT STRIP/BLOOD GLUCOSE: CPT

## 2020-12-16 PROCEDURE — G2012 BRIEF CHECK IN BY MD/QHP: HCPCS | Performed by: INTERNAL MEDICINE

## 2020-12-16 PROCEDURE — 94760 N-INVAS EAR/PLS OXIMETRY 1: CPT

## 2020-12-16 PROCEDURE — 85027 COMPLETE CBC AUTOMATED: CPT | Performed by: PHYSICIAN ASSISTANT

## 2020-12-16 RX ADMIN — GABAPENTIN 600 MG: 300 CAPSULE ORAL at 09:54

## 2020-12-16 RX ADMIN — INSULIN LISPRO 6 UNITS: 100 INJECTION, SOLUTION INTRAVENOUS; SUBCUTANEOUS at 10:56

## 2020-12-16 RX ADMIN — ASPIRIN 325 MG ORAL TABLET 325 MG: 325 PILL ORAL at 09:54

## 2020-12-16 RX ADMIN — OXYCODONE HYDROCHLORIDE AND ACETAMINOPHEN 1000 MG: 500 TABLET ORAL at 09:54

## 2020-12-16 RX ADMIN — Medication 12.5 MG: at 10:55

## 2020-12-16 RX ADMIN — APIXABAN 5 MG: 5 TABLET, FILM COATED ORAL at 18:17

## 2020-12-16 RX ADMIN — DILTIAZEM HYDROCHLORIDE 30 MG: 30 TABLET, FILM COATED ORAL at 12:45

## 2020-12-16 RX ADMIN — INSULIN LISPRO 2 UNITS: 100 INJECTION, SOLUTION INTRAVENOUS; SUBCUTANEOUS at 07:55

## 2020-12-16 RX ADMIN — INSULIN LISPRO 6 UNITS: 100 INJECTION, SOLUTION INTRAVENOUS; SUBCUTANEOUS at 07:56

## 2020-12-16 RX ADMIN — Medication 1 PATCH: at 09:55

## 2020-12-16 RX ADMIN — DEXAMETHASONE SODIUM PHOSPHATE 6 MG: 4 INJECTION, SOLUTION INTRAMUSCULAR; INTRAVENOUS at 19:44

## 2020-12-16 RX ADMIN — TRAZODONE HYDROCHLORIDE 25 MG: 50 TABLET ORAL at 21:52

## 2020-12-16 RX ADMIN — DILTIAZEM HYDROCHLORIDE 30 MG: 30 TABLET, FILM COATED ORAL at 18:17

## 2020-12-16 RX ADMIN — INSULIN LISPRO 6 UNITS: 100 INJECTION, SOLUTION INTRAVENOUS; SUBCUTANEOUS at 15:36

## 2020-12-16 RX ADMIN — DILTIAZEM HYDROCHLORIDE 30 MG: 30 TABLET, FILM COATED ORAL at 05:50

## 2020-12-16 RX ADMIN — INSULIN LISPRO 2 UNITS: 100 INJECTION, SOLUTION INTRAVENOUS; SUBCUTANEOUS at 21:55

## 2020-12-16 RX ADMIN — FAMOTIDINE 20 MG: 20 TABLET ORAL at 09:53

## 2020-12-16 RX ADMIN — INSULIN LISPRO 2 UNITS: 100 INJECTION, SOLUTION INTRAVENOUS; SUBCUTANEOUS at 12:45

## 2020-12-16 RX ADMIN — ACETAMINOPHEN 650 MG: 325 TABLET, FILM COATED ORAL at 09:54

## 2020-12-16 RX ADMIN — INSULIN LISPRO 2 UNITS: 100 INJECTION, SOLUTION INTRAVENOUS; SUBCUTANEOUS at 16:44

## 2020-12-16 RX ADMIN — OXYCODONE HYDROCHLORIDE AND ACETAMINOPHEN 1000 MG: 500 TABLET ORAL at 21:52

## 2020-12-16 RX ADMIN — APIXABAN 5 MG: 5 TABLET, FILM COATED ORAL at 09:54

## 2020-12-16 RX ADMIN — GABAPENTIN 600 MG: 300 CAPSULE ORAL at 18:17

## 2020-12-16 RX ADMIN — ATORVASTATIN CALCIUM 40 MG: 40 TABLET, FILM COATED ORAL at 21:53

## 2020-12-16 RX ADMIN — MELATONIN 3 MG: at 21:53

## 2020-12-16 RX ADMIN — ZINC SULFATE 220 MG (50 MG) CAPSULE 220 MG: CAPSULE at 09:54

## 2020-12-16 RX ADMIN — INSULIN GLARGINE 8 UNITS: 100 INJECTION, SOLUTION SUBCUTANEOUS at 21:53

## 2020-12-16 RX ADMIN — Medication 12.5 MG: at 21:52

## 2020-12-16 RX ADMIN — Medication 2000 UNITS: at 09:54

## 2020-12-16 RX ADMIN — GABAPENTIN 600 MG: 300 CAPSULE ORAL at 21:52

## 2020-12-16 NOTE — ASSESSMENT & PLAN NOTE
· Pt initially requiring 5 L NC on admission with positive COVID screen   · Appreciate pulmonary recommendations, critical notified of increased O2 demands 12/15  · Attempt to keep O2 saturations > 88%  · Currently on 15 L midflow

## 2020-12-16 NOTE — ASSESSMENT & PLAN NOTE
· Likely secondary to COVID-19 infection  · Continue to monitor clinically  · Improved, pt alert and oriented today

## 2020-12-16 NOTE — ASSESSMENT & PLAN NOTE
· Pt presented with generalized weakness and increased confusion, slowly improving   · COVID (+)   · Initially requiring 5 L NC, now increased to 15 L [address], tried to wean to 12 L without success this am  · Pulmonary following, appreciate recommendations   · Continue COVID moderate pathway,   · Completed 5 days of IV Remdesivir on 12/14   · Continue IV Decadron (day 7/10)  · s/p covalescent plasma 12/10  · Procalcitonin negative x2, IV antibiotics discontinued  · Vit C, Zinc, Vit D supplementation x 7 days then MVI  · Lipitor 40 mg daily   · Pepcid 20 mg daily  · Cleared by neurosurgery/neurolgoy for PO Eliquis 5 mg BID in setting of new onset afib  · D-dimer is < 2 5  · Inflammatory markers improving  · Consideration for Actemra if respiratory status worsens further as IL-6 is elevated 84 5  · Discussed with patient regarding code status, he is currently alert and oriented and CONFIRMED desire to be down graded to DNR/DNI status     · Depending on clinical course, if worsening hypoxia would consider palliative care consultation for goals of care with patient

## 2020-12-16 NOTE — RESPIRATORY THERAPY NOTE
Called to patient's room regarding desaturation event  Informed RN to place pt on NRB with [address]  Pt is covid positive with poor reserve and requires time to rebound from low SpO2  Pt came back up to 95% with these modalities

## 2020-12-16 NOTE — PROGRESS NOTES
Progress Note - Syliva Arrow 1950, 65 y o  male MRN: [id number]    Unit/Bed#: -01 Encounter: [id number]    Primary Care Provider: Cari Reagan MD   Date and time admitted to hospital: 12/10/2020 12:59 PM    * COVID-19 virus infection  Assessment & Plan  · Pt presented with generalized weakness and increased confusion, slowly improving   · COVID (+)   · Initially requiring 5 L NC, now increased to 15 L [address], tried to wean to 12 L without success this am  · Pulmonary following, appreciate recommendations   · Continue COVID moderate pathway,   · Completed 5 days of IV Remdesivir on 12/14   · Continue IV Decadron (day 7/10)  · s/p covalescent plasma 12/10  · Procalcitonin negative x2, IV antibiotics discontinued  · Vit C, Zinc, Vit D supplementation x 7 days then MVI  · Lipitor 40 mg daily   · Pepcid 20 mg daily  · Cleared by neurosurgery/neurolgoy for PO Eliquis 5 mg BID in setting of new onset afib  · D-dimer is < 2 5  · Inflammatory markers improving  · Consideration for Actemra if respiratory status worsens further as IL-6 is elevated 84 5  · Discussed with patient regarding code status, he is currently alert and oriented and CONFIRMED desire to be down graded to DNR/DNI status     · Depending on clinical course, if worsening hypoxia would consider palliative care consultation for goals of care with patient    COPD (chronic obstructive pulmonary disease) ([facility] )  Assessment & Plan  · Not typically maintained on supplemental O2 as an outpatient   · Pulmonary following,  · Continue IV Decadron 6 mg daily (day 7/10)  · Continue PRN albuterol   · Supplemental O2 as above    Acute respiratory failure with hypoxia ([facility] )  Assessment & Plan  · Pt initially requiring 5 L NC on admission with positive COVID screen   · Appreciate pulmonary recommendations, critical notified of increased O2 demands 12/15  · Attempt to keep O2 saturations > 88%  · Currently on 15 L midflow    CVA (cerebral vascular accident) [facility])  Assessment & Plan  · Pt with recent hx of stroke x a few months ago followed by SDH thought to be traumatic in setting of falls at home   · Repeat CT head obtained here that was unremarkable   · Baseline left sided weakness  · Continue fall precautions  · Neurology evaluated,  · Discussed with neurosurgery regarding starting anticoagulation in setting of COVID status and new onset a   Fib with elevated chadsvasc score, pt cleared to start a/c, will start on PO Eliquis 5 mg BID  · PT/OT consultations once clinically improved for disposition   · Monitor     Acute metabolic encephalopathy  Assessment & Plan  · Likely secondary to COVID-19 infection  · Continue to monitor clinically  · Improved, patient appears A+O x3    New onset atrial fibrillation ([address] )  Assessment & Plan  · Pt noted to have fluctuations in HR overnight on 12/13, new onset afib  · Cardiology following,  · Continue Cardizem 30 mg Q6H - will transition to CD today 12/16  · PRN lopressor for HR > 130 BPM  · TSH 0 34 with mild elevated T4, obtain free T3   · CHADSVASC 4, started on Eliquis     Positive blood culture  Assessment & Plan  · 1/2 BC (+) for staph coag negative   · Likely contaminant, additional BC negative x 5 days  · Continue to monitor     Hyperglycemia  Assessment & Plan  · Noted on AM BMP  · Glucose 192 this AM  · Repeat hgbA1c 6 5%  · Likely worsening hyperglycemia in setting of IV steroid therapy   · Increased Lantus to 8 units QHS  · Added 2 units scheduled humalog QAC with SSI coverage  · QID glucose checks  · Consistent carb diet  · Monitor and adjust regimen as needed  Recent Labs     12/15/20  0630 12/15/20  1109 12/15/20  1546 12/15/20  2113 12/15/20  2251 12/16/20  0631   POCGLU 260* 397* 411* 338* 326* 416*       Essential hypertension  Assessment & Plan  · Noted to have hypotension on admission  · Significantly improved, stable  · Continue routine vital signs    Other hyperlipidemia  Assessment & Plan  · Atorvastatin 40 mg due to COVID infection        VTE Pharmacologic Prophylaxis:   Pharmacologic: Apixaban (Eliquis)  Mechanical VTE Prophylaxis in Place: Yes    Patient Centered Rounds: I have evaluated patient without nursing staff present due to d/w nurse prior to and after eval    Discussions with Specialists or Other Care Team Provider: CM, pulmonology, neuro     Education and Discussions with Family / Patient: patient, [id number] Parisa Ruelas to update sister Anice Goldmann but no answer (she is getting COVID testing) but spoke to niece Kaci Alva extensively    Time Spent for Care: 1 hour  More than 50% of total time spent on counseling and coordination of care as described above  Current Length of Stay: 6 day(s)    Current Patient Status: Inpatient   Certification Statement: The patient will continue to require additional inpatient hospital stay due to COVID treatment, midlfow O2 treatment, monitoring after starting a/c    Discharge Plan: pending, 48+ hrs     Code Status: Level 3 - DNAR and DNI      Subjective:   Patient seen at bedside, no acute complaints except wanting his coffee prepared  Denies headache, SOB, palpitations, chest pain  Confirms his desire to be level 3 as per convo yesterday  Objective:     Vitals:   Temp (24hrs), Av 1 °F (36 2 °C), Min:96 5 °F (35 8 °C), Max:97 5 °F (36 4 °C)    Temp:  [96 5 °F (35 8 °C)-97 5 °F (36 4 °C)] 96 5 °F (35 8 °C)  HR:  [59-86] 76  Resp:  [18-22] 20  BP: (106-127)/(61-90) 110/69  SpO2:  [79 %-97 %] 79 %  Body mass index is 22 18 kg/m²  Input and Output Summary (last 24 hours): Intake/Output Summary (Last 24 hours) at 2020 0926  Last data filed at 12/15/2020 1905  Gross per 24 hour   Intake 240 ml   Output 800 ml   Net -560 ml       Physical Exam:     Physical Exam  Vitals signs and nursing note reviewed  Constitutional:       General: He is not in acute distress  Appearance: Normal appearance  He is ill-appearing (chronically frail appearing, not acutely ill)   He is not toxic-appearing  Cardiovascular:      Rate and Rhythm: Normal rate and regular rhythm  Heart sounds: Normal heart sounds  No murmur  Pulmonary:      Effort: Pulmonary effort is normal  No tachypnea, accessory muscle usage, respiratory distress or retractions  Breath sounds: Decreased breath sounds present  Comments: O2 via NC 15 L midflow  Abdominal:      General: Bowel sounds are normal  There is no distension  Palpations: Abdomen is soft  Neurological:      Mental Status: He is alert and oriented to person, place, and time  Psychiatric:         Mood and Affect: Mood normal          Behavior: Behavior normal          Additional Data:     Labs:    Results from last 7 days   Lab Units 12/16/20  0543  12/14/20  0546   WBC Thousand/uL 5 35   < > 7 56   HEMOGLOBIN g/dL 11 0*   < > 10 9*   HEMATOCRIT % 33 1*   < > 32 4*   PLATELETS Thousands/uL 201   < > 193   NEUTROS PCT %  --   --  87*   LYMPHS PCT %  --   --  6*   MONOS PCT %  --   --  5   EOS PCT %  --   --  0    < > = values in this interval not displayed       Results from last 7 days   Lab Units 12/16/20  0543   SODIUM mmol/L 134*   POTASSIUM mmol/L 4 7   CHLORIDE mmol/L 101   CO2 mmol/L 22   BUN mg/dL 37*   CREATININE mg/dL 1 04   ANION GAP mmol/L 11   CALCIUM mg/dL 8 4   ALBUMIN g/dL 2 2*   TOTAL BILIRUBIN mg/dL 0 30   ALK PHOS U/L 119*   ALT U/L 41   AST U/L 24   GLUCOSE RANDOM mg/dL 389*     Results from last 7 days   Lab Units 12/10/20  1318   INR  0 98     Results from last 7 days   Lab Units 12/16/20  0631 12/15/20  2251 12/15/20  2113 12/15/20  1546 12/15/20  1109 12/15/20  0630 12/14/20  2036 12/14/20  1533 12/14/20  1245   POC GLUCOSE mg/dl 416* 326* 338* 411* 397* 260* 268* 422* 372*     Results from last 7 days   Lab Units 12/14/20  1241   HEMOGLOBIN A1C % 6 5*     Results from last 7 days   Lab Units 12/15/20  0440 12/14/20  0546 12/11/20  0456 12/10/20  1649 12/10/20  1318   LACTIC ACID mmol/L  --   --   --   --  1 6 PROCALCITONIN ng/ml <0 05 <0 05 0 05 0 05 0 18           * I Have Reviewed All Lab Data Listed Above  * Additional Pertinent Lab Tests Reviewed: All Labs Within Last 24 Hours Reviewed    Imaging:    Imaging Reports Reviewed Today Include:   Imaging Personally Reviewed by Myself Includes:      Recent Cultures (last 7 days):     Results from last 7 days   Lab Units 12/10/20  1318   BLOOD CULTURE  No Growth After 5 Days    Staphylococcus coagulase negative*   GRAM STAIN RESULT  Gram positive cocci in clusters*       Last 24 Hours Medication List:   Current Facility-Administered Medications   Medication Dose Route Frequency Provider Last Rate    acetaminophen  650 mg Oral Q6H PRN Jennifer Leroy MD      apixaban  5 mg Oral BID Shannon Lindsay PA-C      ascorbic acid  1,000 mg Oral Q12H [address] [facility], [place]      aspirin  325 mg Oral Daily Jennifer Leroy MD      atorvastatin  40 mg Oral HS Jennifer Leroy MD      bisacodyl  10 mg Rectal Daily Jennifer Leroy MD      cholecalciferol  2,000 Units Oral Daily Jennifer Leroy MD      dexamethasone  6 mg Intravenous Q24H Jennifer Leroy MD      diltiazem  30 mg Oral Q6H [address] Volodymyr Narvaez PA-C      famotidine  20 mg Oral Daily Jennifer Leroy MD      gabapentin  600 mg Oral TID Jennifer Leroy MD      insulin glargine  8 Units Subcutaneous HS Shannon Lindsay PA-C      insulin lispro  1-5 Units Subcutaneous HS Shannon Lindsay PA-C      insulin lispro  1-6 Units Subcutaneous TID AC Shannon Lindsay PA-C      insulin lispro  2 Units Subcutaneous TID With Meals Shannon Lindsay PA-C      magnesium hydroxide  30 mL Oral Daily PRN Jennifer Leroy MD      melatonin  3 mg Oral HS Irasema Mckinney MD      metoprolol  2 5 mg Intravenous Q6H PRN Ashley Caba PA-C      zinc sulfate  220 mg Oral Daily Jennifer Leroy MD      Followed by   Ashia Grayson ON 12/18/2020] multivitamin-minerals  1 tablet Oral Daily Jennifer Leroy MD      nicotine  1 patch Transdermal Daily Shelbi Armenta MD      ondansetron  4 mg Intravenous Q6H PRN Shelbi Armenta MD      traZODone  25 mg Oral HS Shelbi Armenta MD          Today, Patient Was Seen By: Jackson Wisdom PA-C    ** Please Note: Dictation voice to text software may have been used in the creation of this document   **

## 2020-12-16 NOTE — ASSESSMENT & PLAN NOTE
· Pt with recent hx of stroke x a few months ago followed by SDH thought to be traumatic in setting of falls at home   · Repeat CT head obtained here that was unremarkable   · Baseline left sided weakness  · Continue fall precautions  · Neurology evaluated,  · Discussed with neurosurgery regarding starting anticoagulation in setting of COVID status and new onset a   Fib with elevated chadsvasc score, pt cleared to start a/c, will start on PO Eliquis 5 mg BID  · PT/OT consultations once clinically improved for disposition   · Monitor

## 2020-12-16 NOTE — ASSESSMENT & PLAN NOTE
· Not typically maintained on supplemental O2 as an outpatient   · Pulmonary following,  · Continue IV Decadron 6 mg daily (day 7/10)  · Continue PRN albuterol   · Supplemental O2 as above

## 2020-12-16 NOTE — ASSESSMENT & PLAN NOTE
· Likely secondary to COVID-19 infection  · Continue to monitor clinically  · Improved, patient appears A+O x3

## 2020-12-16 NOTE — ASSESSMENT & PLAN NOTE
· Pt noted to have fluctuations in HR overnight on 12/13, new onset afib  · Cardiology following,  · Continue Cardizem 30 mg Q6H - will transition to CD today 12/16  · PRN lopressor for HR > 130 BPM  · TSH 0 34 with mild elevated T4, obtain free T3   · CHADSVASC 4, started on Eliquis

## 2020-12-16 NOTE — PROGRESS NOTES
Progress Note - Farooq Eloisa 1950, 65 y o  male MRN: [id number]    Unit/Bed#: -01 Encounter: [id number]    Primary Care Provider: Bradley Albrecht MD   Date and time admitted to hospital: 12/10/2020 12:59 PM      DOS: 12/15/2020    * COVID-19 virus infection  Assessment & Plan  · Pt presented with generalized weakness and increased confusion, slowly improving   · COVID (+)   · Initially requiring 5 L NC, now increased to 15 L MFNC, saturating 91% on 15 L  · Pulmonary following, appreciate recommendations   · Moderate pathway,   · Completed 5 days of IV Remdesivir on 12/14   · Continue IV Decadron (day 6/10)  · S/p covalescent plasma administration   · Procalcitonin negative x2, IV antibiotics discontinued  · Vit C, Zinc, Vit D supplementation  · Lipitor 40 mg daily   · Pepcid 20 mg daily  · Switch to PO Eliquis 5 mg BID in setting of new onset a  Fib after discussion with neurology and neurosurgery  · D-dimer is < 2 5, did have increase to 2 3 today  · Repeat D-dimer in the AM  · Inflammatory markers improving  · Consideration for Actemra   · Discussed with patient regarding code status, he is currently alert and oriented and is requesting to be down graded to DNR/DNI status  Depending on clinical course, if worsening hypoxia would consider palliative care consultation for goals of care with patient    New onset atrial fibrillation [facility])  Assessment & Plan  · Pt noted to have fluctuations in HR overnight on 12/13  · EKG obtained with evidence of A  Fib with RVR, new onset  · HR improved to 90s-100s on monitor   · Cardiology following,  · Continue Cardizem 30 mg Q6H  · PRN lopressor for HR > 130 BPM  · TSH 0 34 with mild elevated T4, obtain free T3   · Pt with Chadsvasc score of 4, discussed with neurosurgery today, repeat CT head without evidence of SDH   Pt cleared by neurology and neurosurgery to start anticoagulation, will place on Eliquis 5 mg BID in addition to aspirin therapy and monitor closely    Hyperglycemia  Assessment & Plan  · Noted on AM BMP  · Glucose 192 this AM  · Repeat hgbA1c 6 5%  · Likely worsening hyperglycemia in setting of IV steroid therapy   · Increase Lantus to 8 units QHS  · Add 2 units scheduled humalog QAC with SSI coverage  · QID glucose checks  · Consistent carb diet  · Monitor and adjust regimen as needed    Positive blood culture  Assessment & Plan  · 1/2 BC (+) for staph coag negative   · Likely contaminant, additional BC negative x 5 days  · Continue to monitor     Acute metabolic encephalopathy  Assessment & Plan  · Likely secondary to COVID-19 infection  · Continue to monitor clinically  · Improved, pt alert and oriented today    Acute respiratory failure with hypoxia ([facility])  Assessment & Plan  · Pt initially requiring 5 L NC on admission with positive COVID screen   · Hypoxia slightly worsened, pt was maintained on 10 L [address] throughout the day with improvement, however noted to have desaturation event today and was increased to 15 L [address]  · Will notify critical care   · Pt requesting to be DNR/DNI, currently alert and oriented, consideration for palliative care consultation pending clinical course  · Appreciate pulmonary recommendations   · Attempt to keep O2 saturations > 88%    COPD (chronic obstructive pulmonary disease) ([facility] )  Assessment & Plan  · Not typically maintained on supplemental O2 as an outpatient   · Pulmonary following,  · Continue IV Decadron 6 mg daily (day 6/10)  · Continue PRN albuterol     CVA (cerebral vascular accident) ([facility] )  Assessment & Plan  · Pt with recent hx of stroke x a few months ago followed by SDH thought to be traumatic in setting of falls at home   · Repeat CT head obtained here that was unremarkable   · Baseline left sided weakness  · Continue fall precautions  · Neurology evaluated,  · Discussed with neurosurgery regarding starting anticoagulation in setting of COVID status and new onset a   Fib with elevated chadsvasc score, pt cleared to start a/c, will start on PO Eliquis 5 mg BID  · PT/OT consultations once clinically improved for disposition   · Monitor     Essential hypertension  Assessment & Plan  · Noted to have hypotension on admission  · Significantly improved, stable  · Not maintained on any antihypertensives on review   · Initiated on Cardizem 30 mg Q6H for a  Fib, continue  · Continue to monitor     Other hyperlipidemia  Assessment & Plan  · Will change statin to atorvastatin 40 mg due to COVID infection      VTE Pharmacologic Prophylaxis:   Pharmacologic: Apixaban (Eliquis)  Mechanical VTE Prophylaxis in Place: Yes    Patient Centered Rounds: I have evaluated patient without nursing staff present due to Speaking to nurse outside patient's room secondary to COVID precautions    Discussions with Specialists or Other Care Team Provider:  Discussed with Cardiology, Pulmonary, Neurosurgery, RN, cm and reviewed previous notes    Education and Discussions with Family / Patient:  Discussed with patient at bedside regarding plan of care, attempted to call patient's sister Valerie Nguyễn over the phone , however unable to reach secondary to busy signal    Time Spent for Care: 30 minutes  More than 50% of total time spent on counseling and coordination of care as described above  Current Length of Stay: 5 day(s)    Current Patient Status: Inpatient   Certification Statement: The patient will continue to require additional inpatient hospital stay due to Increased oxygen demands, IV Decadron    Discharge Plan:  Not medically stable as above    Code Status: Level 3 - DNAR and DNI      Subjective:   Patient reports that he feels okay today  States that he would not want any intubation or resuscitation if anything would happen with his breathing  Denies any chest pain or shortness of breath      Objective:     Vitals:   Temp (24hrs), Av 1 °F (36 2 °C), Min:96 1 °F (35 6 °C), Max:97 7 °F (36 5 °C)    Temp:  [96 1 °F (35 6 °C)-97 7 °F (36 5 °C)] 97 1 °F (36 2 °C)  HR:  [57-86] 75  Resp:  [18-21] 21  BP: (104-127)/(61-90) 106/66  SpO2:  [82 %-97 %] 82 %  Body mass index is 22 18 kg/m²  Input and Output Summary (last 24 hours): Intake/Output Summary (Last 24 hours) at 12/15/2020 2011  Last data filed at 12/15/2020 5195  Gross per 24 hour   Intake 240 ml   Output 1600 ml   Net -1360 ml       Physical Exam:     Physical Exam  Vitals signs reviewed  Constitutional:       General: He is not in acute distress  Appearance: He is not toxic-appearing  Comments: Patient is in no acute distress lying in his hospital bed resting comfortably  Previously on 10 L nasal cannula saturating low 90s   HENT:      Head: Normocephalic and atraumatic  Eyes:      Conjunctiva/sclera: Conjunctivae normal       Pupils: Pupils are equal, round, and reactive to light  Cardiovascular:      Rate and Rhythm: Tachycardia present  Rhythm irregular  Pulmonary:      Effort: Pulmonary effort is normal  No respiratory distress  Breath sounds: Normal breath sounds  No wheezing or rales  Abdominal:      General: Bowel sounds are normal  There is no distension  Palpations: Abdomen is soft  Tenderness: There is no abdominal tenderness  There is no guarding  Musculoskeletal:         General: No swelling  Right lower leg: No edema  Left lower leg: No edema  Skin:     General: Skin is warm and dry  Findings: No erythema  Neurological:      Mental Status: He is alert     Psychiatric:         Mood and Affect: Mood normal          Additional Data:     Labs:    Results from last 7 days   Lab Units 12/15/20  0440 12/14/20  0546   WBC Thousand/uL 6 60 7 56   HEMOGLOBIN g/dL 11 3* 10 9*   HEMATOCRIT % 33 2* 32 4*   PLATELETS Thousands/uL 197 193   NEUTROS PCT %  --  87*   LYMPHS PCT %  --  6*   MONOS PCT %  --  5   EOS PCT %  --  0     Results from last 7 days   Lab Units 12/15/20  0440   POTASSIUM mmol/L 4 7   CHLORIDE mmol/L 104   CO2 mmol/L 22   BUN mg/dL 29*   CREATININE mg/dL 1 02   CALCIUM mg/dL 8 7   ALK PHOS U/L 121*   ALT U/L 45   AST U/L 32     Results from last 7 days   Lab Units 12/10/20  1318   INR  0 98       * I Have Reviewed All Lab Data Listed Above  * Additional Pertinent Lab Tests Reviewed: All Labs Within Last 24 Hours Reviewed    Imaging:    Imaging Reports Reviewed Today Include:  CT head  Imaging Personally Reviewed by Myself Includes:  None    Recent Cultures (last 7 days):     Results from last 7 days   Lab Units 12/10/20  1318   BLOOD CULTURE  No Growth After 5 Days    Staphylococcus coagulase negative*   GRAM STAIN RESULT  Gram positive cocci in clusters*       Last 24 Hours Medication List:   Current Facility-Administered Medications   Medication Dose Route Frequency Provider Last Rate    acetaminophen  650 mg Oral Q6H PRN [facility], MD      ascorbic acid  1,000 mg Oral Q12H [address] [facility], [place]      aspirin  325 mg Oral Daily [facility], MD      atorvastatin  40 mg Oral HS [facility], MD      bisacodyl  10 mg Rectal Daily [facility], MD      cholecalciferol  2,000 Units Oral Daily [facility], MD      dexamethasone  6 mg Intravenous Q24H [facility], MD      diltiazem  30 mg Oral Q6H [address] Volodymyr Narvaez PA-C      famotidine  20 mg Oral Daily [facility], MD      gabapentin  600 mg Oral TID [facility], MD      heparin (porcine)  5,000 Units Subcutaneous Q8H [address] [facility] MD Qi      insulin glargine  8 Units Subcutaneous HS Shannon Lindsay PA-C      insulin lispro  1-5 Units Subcutaneous HS Shannon Lindsay PA-C      insulin lispro  1-6 Units Subcutaneous TID AC Shannon Lindsay PA-C      [START ON 12/16/2020] insulin lispro  2 Units Subcutaneous TID With Meals Shannon Lindsay PA-C      magnesium hydroxide  30 mL Oral Daily PRN Arlin Busby, MD      melatonin  3 mg Oral HS Irasema Mckinney MD      metoprolol  2 5 mg Intravenous Q6H PRN Velia Easley IVET      zinc sulfate  220 mg Oral Daily Herbie Sheriff MD      Followed by   Nemo Terrazas ON 12/18/2020] multivitamin-minerals  1 tablet Oral Daily Herbie Sheriff MD      nicotine  1 patch Transdermal Daily Herbie Sheriff MD      ondansetron  4 mg Intravenous Q6H PRN Herbie Sheriff MD      traZODone  25 mg Oral HS Herbie Sheriff MD          Today, Patient Was Seen By: Xiomara Ashby PA-C    ** Please Note: Dictation voice to text software may have been used in the creation of this document   **

## 2020-12-16 NOTE — ASSESSMENT & PLAN NOTE
· Pt initially requiring 5 L NC on admission with positive COVID screen   · Hypoxia slightly worsened, pt was maintained on 10 L [address] throughout the day with improvement, however noted to have desaturation event today and was increased to 15 L [address]  · Will notify critical care   · Pt requesting to be DNR/DNI, currently alert and oriented, consideration for palliative care consultation pending clinical course  · Appreciate pulmonary recommendations   · Attempt to keep O2 saturations > 88%

## 2020-12-16 NOTE — ASSESSMENT & PLAN NOTE
· Not typically maintained on supplemental O2 as an outpatient   · Pulmonary following,  · Continue IV Decadron 6 mg daily (day 6/10)  · Continue PRN albuterol

## 2020-12-16 NOTE — PLAN OF CARE
Problem: Potential for Falls  Goal: Patient will remain free of falls  Description: INTERVENTIONS:  - Assess patient frequently for physical needs  -  Identify cognitive and physical deficits and behaviors that affect risk of falls    -  [place] fall precautions as indicated by assessment   - Educate patient/family on patient safety including physical limitations  - Instruct patient to call for assistance with activity based on assessment  - Modify environment to reduce risk of injury  - Consider OT/PT consult to assist with strengthening/mobility  Outcome: Progressing     Problem: PAIN - ADULT  Goal: Verbalizes/displays adequate comfort level or baseline comfort level  Description: Interventions:  - Encourage patient to monitor pain and request assistance  - Assess pain using appropriate pain scale  - Administer analgesics based on type and severity of pain and evaluate response  - Implement non-pharmacological measures as appropriate and evaluate response  - Consider cultural and social influences on pain and pain management  - Notify physician/advanced practitioner if interventions unsuccessful or patient reports new pain  Outcome: Progressing     Problem: INFECTION - ADULT  Goal: Absence or prevention of progression during hospitalization  Description: INTERVENTIONS:  - Assess and monitor for signs and symptoms of infection  - Monitor lab/diagnostic results  - Monitor all insertion sites, i e  indwelling lines, tubes, and drains  - Monitor endotracheal if appropriate and nasal secretions for changes in amount and color  - [place] appropriate cooling/warming therapies per order  - Administer medications as ordered  - Instruct and encourage patient and family to use good hand hygiene technique  - Identify and instruct in appropriate isolation precautions for identified infection/condition  Outcome: Progressing  Goal: Absence of fever/infection during neutropenic period  Description: INTERVENTIONS:  - Monitor WBC    Outcome: Progressing     Problem: SAFETY ADULT  Goal: Patient will remain free of falls  Description: INTERVENTIONS:  - Assess patient frequently for physical needs  -  Identify cognitive and physical deficits and behaviors that affect risk of falls    -  [place] fall precautions as indicated by assessment   - Educate patient/family on patient safety including physical limitations  - Instruct patient to call for assistance with activity based on assessment  - Modify environment to reduce risk of injury  - Consider OT/PT consult to assist with strengthening/mobility  Outcome: Progressing  Goal: Maintain or return to baseline ADL function  Description: INTERVENTIONS:  -  Assess patient's ability to carry out ADLs; assess patient's baseline for ADL function and identify physical deficits which impact ability to perform ADLs (bathing, care of mouth/teeth, toileting, grooming, dressing, etc )  - Assess/evaluate cause of self-care deficits   - Assess range of motion  - Assess patient's mobility; develop plan if impaired  - Assess patient's need for assistive devices and provide as appropriate  - Encourage maximum independence but intervene and supervise when necessary  - Involve family in performance of ADLs  - Assess for home care needs following discharge   - Consider OT consult to assist with ADL evaluation and planning for discharge  - Provide patient education as appropriate  Outcome: Progressing  Goal: Maintain or return mobility status to optimal level  Description: INTERVENTIONS:  - Assess patient's baseline mobility status (ambulation, transfers, stairs, etc )    - Identify cognitive and physical deficits and behaviors that affect mobility  - Identify mobility aids required to assist with transfers and/or ambulation (gait belt, sit-to-stand, lift, walker, cane, etc )  - [place] fall precautions as indicated by assessment  - Record patient progress and toleration of activity level on Mobility SBAR; progress patient to next Phase/Stage  - Instruct patient to call for assistance with activity based on assessment  - Consider rehabilitation consult to assist with strengthening/weightbearing, etc   Outcome: Progressing     Problem: DISCHARGE PLANNING  Goal: Discharge to home or other facility with appropriate resources  Description: INTERVENTIONS:  - Identify barriers to discharge w/patient and caregiver  - Arrange for needed discharge resources and transportation as appropriate  - Identify discharge learning needs (meds, wound care, etc )  - Arrange for interpretive services to assist at discharge as needed  - Refer to Case Management Department for coordinating discharge planning if the patient needs post-hospital services based on physician/advanced practitioner order or complex needs related to functional status, cognitive ability, or social support system  Outcome: Progressing     Problem: Knowledge Deficit  Goal: Patient/family/caregiver demonstrates understanding of disease process, treatment plan, medications, and discharge instructions  Description: Complete learning assessment and assess knowledge base    Interventions:  - Provide teaching at level of understanding  - Provide teaching via preferred learning methods  Outcome: Progressing     Problem: Prexisting or High Potential for Compromised Skin Integrity  Goal: Skin integrity is maintained or improved  Description: INTERVENTIONS:  - Identify patients at risk for skin breakdown  - Assess and monitor skin integrity  - Assess and monitor nutrition and hydration status  - Monitor labs   - Assess for incontinence   - Turn and reposition patient  - Assist with mobility/ambulation  - Relieve pressure over bony prominences  - Avoid friction and shearing  - Provide appropriate hygiene as needed including keeping skin clean and dry  - Evaluate need for skin moisturizer/barrier cream  - Collaborate with interdisciplinary team   - Patient/family teaching  - Consider wound care consult   Outcome: Progressing

## 2020-12-16 NOTE — PROGRESS NOTES
Cardiology Progress Note - Karen Villela 65 y o  male MRN: [id number]    Unit/Bed#: -01 Encounter: [id number]      Assessment/Plan:  1-new onset of atrial fibrillation with RVR, heart rates improved 90s to 100s, add Lopressor 12 5 b i d   Patient started on Eliquis after clearance by Neurosurgery  Patient also on full-strength aspirin, [facility] Hospitalist to discuss with Neurology  2-COVID infection 19, continue with pathway, management per [facility] Hospitalist and pulmonary    3-hypertension, stable  Continue all medications    4-hyperlipidemia, continue with Lipitor    5-history of stroke and or subdural hematoma, Neurology following  Patient is stable from a cardiac standpoint, will sign off  Call if needed  Subjective:   Spoke to pt over the phone  No significant events overnight  Im feeling ok  Denies chest pain    Objective:     Vitals: Blood pressure 110/69, pulse 76, temperature (!) 96 5 °F (35 8 °C), temperature source Oral, resp  rate 20, height 5' 4" (1 626 m), weight 58 6 kg (129 lb 3 oz), SpO2 (!) 79 %  , Body mass index is 22 18 kg/m² ,   Orthostatic Blood Pressures      Most Recent Value   Blood Pressure  110/69 filed at 12/16/2020 0536   Patient Position - Orthostatic VS  Lying filed at 12/16/2020 0536            Intake/Output Summary (Last 24 hours) at 12/16/2020 1047  Last data filed at 12/15/2020 1905  Gross per 24 hour   Intake 240 ml   Output 800 ml   Net -560 ml         Physical Exam: Pt was spoken to over the phone, due to COVID infection      Medications:      Current Facility-Administered Medications:     acetaminophen (TYLENOL) tablet 650 mg, 650 mg, Oral, Q6H PRN, Amanda Penn MD, 650 mg at 12/16/20 0954    apixaban (ELIQUIS) tablet 5 mg, 5 mg, Oral, BID, Shannon Lindsay PA-C, 5 mg at 12/16/20 0954    ascorbic acid (VITAMIN C) tablet 1,000 mg, 1,000 mg, Oral, Q12H [address], Amanda Penn MD, 1,000 mg at 12/16/20 0954   aspirin tablet 325 mg, 325 mg, Oral, Daily, Maria Teresa Nazario MD, 325 mg at 12/16/20 0954    atorvastatin (LIPITOR) tablet 40 mg, 40 mg, Oral, HS, Maria Teresa Nazario MD, 40 mg at 12/15/20 2121    bisacodyl (DULCOLAX) rectal suppository 10 mg, 10 mg, Rectal, Daily, Maria Teresa Nazario MD, 10 mg at 12/11/20 1000    cholecalciferol (VITAMIN D3) tablet 2,000 Units, 2,000 Units, Oral, Daily, Maria Teresa Nazario MD, 2,000 Units at 12/16/20 0954    dexamethasone (DECADRON) injection 6 mg, 6 mg, Intravenous, Q24H, Maria Teresa Nazario MD, 6 mg at 12/15/20 1855    diltiazem (CARDIZEM) tablet 30 mg, 30 mg, Oral, Q6H [facility], Volodymyr LAUREN Narvaez, IVET, 30 mg at 12/16/20 0550    famotidine (PEPCID) tablet 20 mg, 20 mg, Oral, Daily, Maria Teresa Nazario MD, 20 mg at 12/16/20 0953    gabapentin (NEURONTIN) capsule 600 mg, 600 mg, Oral, TID, Maria Teresa Nazario MD, 600 mg at 12/16/20 0954    insulin glargine (LANTUS) subcutaneous injection 8 Units 0 08 mL, 8 Units, Subcutaneous, HS, Shannon Lindsay PA-C, 8 Units at 12/15/20 2120    insulin lispro (HumaLOG) 100 units/mL subcutaneous injection 1-5 Units, 1-5 Units, Subcutaneous, HS, Shannon Lindsay PA-C, 3 Units at 12/15/20 2125    insulin lispro (HumaLOG) 100 units/mL subcutaneous injection 1-6 Units, 1-6 Units, Subcutaneous, TID AC, 6 Units at 12/16/20 0756 **AND** Fingerstick Glucose (POCT), , , TID AC, Shannon Lindsay PA-C    insulin lispro (HumaLOG) 100 units/mL subcutaneous injection 2 Units, 2 Units, Subcutaneous, TID With Meals, Maryfrances Feather, IVET, 2 Units at 12/16/20 0755    magnesium hydroxide (MILK OF MAGNESIA) oral suspension 30 mL, 30 mL, Oral, Daily PRN, Maria Teresa Nazario MD    melatonin tablet 3 mg, 3 mg, Oral, HS, Maria Teresa Nazario MD, 3 mg at 12/15/20 2121    metoprolol (LOPRESSOR) injection 2 5 mg, 2 5 mg, Intravenous, Q6H PRN, Shannon Lindsay PA-C    metoprolol tartrate (LOPRESSOR) partial tablet 12 5 mg, 12 5 mg, Oral, Q12H ALEJANDRINA, Fang Carrillo PA-C    zinc sulfate (ZINCATE) capsule 220 mg, 220 mg, Oral, Daily, 220 mg at 12/16/20 0954 **FOLLOWED BY** [START ON 12/18/2020] multivitamin-minerals (CENTRUM ADULTS) tablet 1 tablet, 1 tablet, Oral, Daily, Aman Vasquez MD    nicotine (NICODERM CQ) 14 mg/24hr TD 24 hr patch 1 patch, 1 patch, Transdermal, Daily, Aman Vasquez MD, 1 patch at 12/15/20 0905    ondansetron (ZOFRAN) injection 4 mg, 4 mg, Intravenous, Q6H PRN, Aman Vasquez MD    traZODone (DESYREL) tablet 25 mg, 25 mg, Oral, HS, Aman Vasquez MD, 25 mg at 12/15/20 2121     Labs & Results:    Results from last 7 days   Lab Units 12/10/20  1318   TROPONIN I ng/mL 0 02     Results from last 7 days   Lab Units 12/16/20  0543 12/15/20  0440 12/14/20  0546   WBC Thousand/uL 5 35 6 60 7 56   HEMOGLOBIN g/dL 11 0* 11 3* 10 9*   HEMATOCRIT % 33 1* 33 2* 32 4*   PLATELETS Thousands/uL 201 197 193         Results from last 7 days   Lab Units 12/16/20  0543 12/15/20  0440 12/13/20  0527   POTASSIUM mmol/L 4 7 4 7 4 5   CHLORIDE mmol/L 101 104 109*   CO2 mmol/L 22 22 22   BUN mg/dL 37* 29* 30*   CREATININE mg/dL 1 04 1 02 0 96   CALCIUM mg/dL 8 4 8 7 8 8   ALK PHOS U/L 119* 121* 99   ALT U/L 41 45 35   AST U/L 24 32 49*     Results from last 7 days   Lab Units 12/10/20  1318   INR  0 98   PTT seconds 33     Results from last 7 days   Lab Units 12/10/20  1318   MAGNESIUM mg/dL 2 1

## 2020-12-16 NOTE — ASSESSMENT & PLAN NOTE
· Pt noted to have fluctuations in HR overnight on 12/13  · EKG obtained with evidence of A  Fib with RVR, new onset  · HR improved to 90s-100s on monitor   · Cardiology following,  · Continue Cardizem 30 mg Q6H  · PRN lopressor for HR > 130 BPM  · TSH 0 34 with mild elevated T4, obtain free T3   · Pt with Chadsvasc score of 4, discussed with neurosurgery today, repeat CT head without evidence of SDH   Pt cleared by neurology and neurosurgery to start anticoagulation, will place on Eliquis 5 mg BID in addition to aspirin therapy and monitor closely

## 2020-12-16 NOTE — RESPIRATORY THERAPY NOTE
Called to patient room regarding desaturations, pt on 15LPM [address] [facility], spoke to patient about importance of proning which he refused, pt also took the sat probe off threw it and told me to leave him alone and let him die  Pt's SpO2 came back up to 90 on the 15L [address]   SLIM contacted at patient's request

## 2020-12-16 NOTE — ASSESSMENT & PLAN NOTE
· 1/2 BC (+) for staph coag negative   · Likely contaminant, additional BC negative x 5 days  · Continue to monitor

## 2020-12-16 NOTE — PROGRESS NOTES
Progress Note - Pulmonary   Karlee Reyes 65 y o  male MRN: [id number]  Unit/Bed#: -Sebastian Encounter: [id number]    Assessment:  Acute hypoxemic respiratory failure  COVID-19 pneumonia  Recent CVA  New onset AFib    Plan:  Acute hypoxemic respiratory failure secondary to COVID-19 pneumonia  Currently on moderate COVID pathway   O2 requirements increased from 10 L to 15 L   No O2 requirement at baseline, titrate to maintain O2 sats greater than 90%  Evaluated by critical care 12/14  - dexamethasone 6 mg x 10 days (day 7/10)  - remdesivir x 5 days (completed)  - received convalescent plasma  - consideration for Actemra  - continue zinc, multivitamin, vitamin-C   - supportive care with incentive spirometer, self proning is limited due to recent stroke and left sided weakness   - procalcitonin has been negative x2, can monitor off antibiotics  - inflammatory markers are elevated but improving, D-dimer less than 2 5  Patient is on Eliquis for a fib  Cardiology following     Discussed with SLIM    Subjective:   Despite increasing O2 requirements, patient continues to deny shortness of breath  No chest pain  No new complaints  12 point review of systems otherwise negative  Objective:     Vitals: Blood pressure 110/69, pulse 76, temperature (!) 96 5 °F (35 8 °C), temperature source Oral, resp  rate 20, height 5' 4" (1 626 m), weight 58 6 kg (129 lb 3 oz), SpO2 (!) 79 %  ,Body mass index is 22 18 kg/m²  Intake/Output Summary (Last 24 hours) at 12/16/2020 0919  Last data filed at 12/15/2020 1905  Gross per 24 hour   Intake 240 ml   Output 800 ml   Net -560 ml       Invasive Devices     Peripheral Intravenous Line            Peripheral IV 12/12/20 Left;Ventral (anterior) Hand 4 days          Drain            External Urinary Catheter Medium 4 days                Physical Exam:   Examined through the glass for infection control and done in conjunction with SLIM  General: Awake, alert, and oriented   No acute distress  WDWN  Looks comfortable  HEENT: Normocephalic, without obvious abnormality, atraumatic  EOM intact  Sclera non-icteric, non-injected  Hearing in tact  Pulm:15 L [address]  No respiratory distress  Decreased breath sounds per SLIM  Cardiovascular:  Normal rate  No edema per SLIM  Musculoskeletal:  No gross deformity  Abdomen:  No significant distention appreciated   Neuro:  Residual deficits and weakness from previous stroke  Skin:  Appears to be dry  No jaundice, erythema, or cyanosis appreciated  Psych:  Cooperative, appropriate situational mood  No acute psychosis  Labs: I have personally reviewed pertinent lab results  Imaging and other studies: I have personally reviewed pertinent reports

## 2020-12-16 NOTE — ASSESSMENT & PLAN NOTE
· Noted on AM BMP  · Glucose 192 this AM  · Repeat hgbA1c 6 5%  · Likely worsening hyperglycemia in setting of IV steroid therapy   · Increased Lantus to 8 units QHS  · Added 2 units scheduled humalog QAC with SSI coverage  · QID glucose checks  · Consistent carb diet  · Monitor and adjust regimen as needed  Recent Labs     12/15/20  0630 12/15/20  1109 12/15/20  1546 12/15/20  2113 12/15/20  2251 12/16/20  0631   POCGLU 260* 397* 411* 338* 326* 416*

## 2020-12-16 NOTE — ASSESSMENT & PLAN NOTE
· Noted to have hypotension on admission  · Significantly improved, stable  · Continue routine vital signs

## 2020-12-16 NOTE — ASSESSMENT & PLAN NOTE
· Noted to have hypotension on admission  · Significantly improved, stable  · Not maintained on any antihypertensives on review   · Initiated on Cardizem 30 mg Q6H for a  Fib, continue  · Continue to monitor

## 2020-12-17 ENCOUNTER — APPOINTMENT (INPATIENT)
Dept: RADIOLOGY | Facility: HOSPITAL | Age: 70
DRG: 177 | End: 2020-12-17
Payer: COMMERCIAL

## 2020-12-17 LAB
GLUCOSE SERPL-MCNC: 228 MG/DL (ref 65–140)
GLUCOSE SERPL-MCNC: 362 MG/DL (ref 65–140)
GLUCOSE SERPL-MCNC: 406 MG/DL (ref 65–140)
GLUCOSE SERPL-MCNC: 434 MG/DL (ref 65–140)

## 2020-12-17 PROCEDURE — 82948 REAGENT STRIP/BLOOD GLUCOSE: CPT

## 2020-12-17 PROCEDURE — 94660 CPAP INITIATION&MGMT: CPT

## 2020-12-17 PROCEDURE — 99232 SBSQ HOSP IP/OBS MODERATE 35: CPT | Performed by: PHYSICIAN ASSISTANT

## 2020-12-17 PROCEDURE — 99232 SBSQ HOSP IP/OBS MODERATE 35: CPT | Performed by: INTERNAL MEDICINE

## 2020-12-17 PROCEDURE — 71045 X-RAY EXAM CHEST 1 VIEW: CPT

## 2020-12-17 PROCEDURE — 94760 N-INVAS EAR/PLS OXIMETRY 1: CPT

## 2020-12-17 RX ORDER — DILTIAZEM HYDROCHLORIDE 120 MG/1
120 CAPSULE, COATED, EXTENDED RELEASE ORAL DAILY
Status: DISCONTINUED | OUTPATIENT
Start: 2020-12-17 | End: 2020-12-17

## 2020-12-17 RX ORDER — INSULIN GLARGINE 100 [IU]/ML
12 INJECTION, SOLUTION SUBCUTANEOUS
Status: DISCONTINUED | OUTPATIENT
Start: 2020-12-17 | End: 2020-12-18

## 2020-12-17 RX ORDER — DILTIAZEM HYDROCHLORIDE 120 MG/1
120 CAPSULE, COATED, EXTENDED RELEASE ORAL DAILY
Status: DISCONTINUED | OUTPATIENT
Start: 2020-12-17 | End: 2020-12-22

## 2020-12-17 RX ADMIN — INSULIN LISPRO 2 UNITS: 100 INJECTION, SOLUTION INTRAVENOUS; SUBCUTANEOUS at 10:05

## 2020-12-17 RX ADMIN — INSULIN LISPRO 6 UNITS: 100 INJECTION, SOLUTION INTRAVENOUS; SUBCUTANEOUS at 13:29

## 2020-12-17 RX ADMIN — GABAPENTIN 600 MG: 300 CAPSULE ORAL at 09:58

## 2020-12-17 RX ADMIN — Medication 12.5 MG: at 09:58

## 2020-12-17 RX ADMIN — APIXABAN 5 MG: 5 TABLET, FILM COATED ORAL at 09:58

## 2020-12-17 RX ADMIN — INSULIN GLARGINE 12 UNITS: 100 INJECTION, SOLUTION SUBCUTANEOUS at 21:11

## 2020-12-17 RX ADMIN — GABAPENTIN 600 MG: 300 CAPSULE ORAL at 21:08

## 2020-12-17 RX ADMIN — OXYCODONE HYDROCHLORIDE AND ACETAMINOPHEN 1000 MG: 500 TABLET ORAL at 10:04

## 2020-12-17 RX ADMIN — TRAZODONE HYDROCHLORIDE 25 MG: 50 TABLET ORAL at 21:08

## 2020-12-17 RX ADMIN — ATORVASTATIN CALCIUM 40 MG: 40 TABLET, FILM COATED ORAL at 21:08

## 2020-12-17 RX ADMIN — Medication 1 SPRAY: at 13:28

## 2020-12-17 RX ADMIN — DILTIAZEM HYDROCHLORIDE 120 MG: 120 CAPSULE, COATED, EXTENDED RELEASE ORAL at 17:28

## 2020-12-17 RX ADMIN — INSULIN LISPRO 3 UNITS: 100 INJECTION, SOLUTION INTRAVENOUS; SUBCUTANEOUS at 21:11

## 2020-12-17 RX ADMIN — ZINC SULFATE 220 MG (50 MG) CAPSULE 220 MG: CAPSULE at 09:58

## 2020-12-17 RX ADMIN — Medication 12.5 MG: at 21:08

## 2020-12-17 RX ADMIN — APIXABAN 5 MG: 5 TABLET, FILM COATED ORAL at 17:22

## 2020-12-17 RX ADMIN — DEXAMETHASONE SODIUM PHOSPHATE 6 MG: 4 INJECTION, SOLUTION INTRAMUSCULAR; INTRAVENOUS at 21:10

## 2020-12-17 RX ADMIN — DILTIAZEM HYDROCHLORIDE 30 MG: 30 TABLET, FILM COATED ORAL at 13:28

## 2020-12-17 RX ADMIN — INSULIN LISPRO 2 UNITS: 100 INJECTION, SOLUTION INTRAVENOUS; SUBCUTANEOUS at 10:04

## 2020-12-17 RX ADMIN — MELATONIN 3 MG: at 21:08

## 2020-12-17 RX ADMIN — DILTIAZEM HYDROCHLORIDE 30 MG: 30 TABLET, FILM COATED ORAL at 05:53

## 2020-12-17 RX ADMIN — INSULIN LISPRO 2 UNITS: 100 INJECTION, SOLUTION INTRAVENOUS; SUBCUTANEOUS at 13:29

## 2020-12-17 RX ADMIN — INSULIN LISPRO 6 UNITS: 100 INJECTION, SOLUTION INTRAVENOUS; SUBCUTANEOUS at 17:29

## 2020-12-17 RX ADMIN — Medication 2000 UNITS: at 09:58

## 2020-12-17 RX ADMIN — FAMOTIDINE 20 MG: 20 TABLET ORAL at 09:58

## 2020-12-17 RX ADMIN — GABAPENTIN 600 MG: 300 CAPSULE ORAL at 17:22

## 2020-12-17 NOTE — ASSESSMENT & PLAN NOTE
· Not typically maintained on supplemental O2 as an outpatient   · Pulmonary following,  · Continue IV Decadron 6 mg daily (day 8/10)  · Continue PRN albuterol   · Supplemental O2 as above

## 2020-12-17 NOTE — PROGRESS NOTES
Progress Note - Pulmonary   Aristides Saint 65 y o  male MRN: [id number]  Unit/Bed#: -01 Encounter: [id number]    Assessment:  1  Acute hypoxemic respiratory failure  2  COVID-19 pneumonia  3  Recent CVA  4  New onset AFib    Plan:  Acute hypoxemic respiratory failure secondary to COVID-19 pneumonia  Continues on moderate COVID pathway  Remains on 15 L O2 with sats in the high 80s/low 90s  He denies any significant shortness or breath, no respiratory distress  - dexamethasone 6 mg x 10 days, day 8 of 10  - completed remdesivir, received convalescent plasma  - continue zinc, multivitamin, vitamin-C  - supportive care with incentive spirometer  Procalcitonin has been negative, continue to monitor antibiotics  - inflammatory markers trending down  Will order chest x-ray for this morning given increased O2 requirements, patient has limited mobility due to a stroke, ? atelectasis contributing  Will continue to follow    Subjective:   Patient resting in bed  He reports no acute complaints  Denies any shortness of breath and actually feels well and would like to go home  Objective:     Vitals: Blood pressure 109/57, pulse 76, temperature (!) 97 4 °F (36 3 °C), temperature source Oral, resp  rate 20, height 5' 4" (1 626 m), weight 59 6 kg (131 lb 6 3 oz), SpO2 (!) 88 %  ,Body mass index is 22 55 kg/m²        Intake/Output Summary (Last 24 hours) at 12/17/2020 1223  Last data filed at 12/17/2020 0948  Gross per 24 hour   Intake 960 ml   Output 875 ml   Net 85 ml       Invasive Devices     Peripheral Intravenous Line            Peripheral IV 12/12/20 Left;Ventral (anterior) Hand 5 days          Drain            External Urinary Catheter Medium 6 days                Physical Exam: /57   Pulse 76   Temp (!) 97 4 °F (36 3 °C) (Oral)   Resp 20   Ht 5' 4" (1 626 m)   Wt 59 6 kg (131 lb 6 3 oz)   SpO2 (!) 88%   BMI 22 55 kg/m²   General appearance: alert and oriented, in no acute distress  Eyes: PERRL  Lungs: diminished breath sounds  Heart: irregularly irregular rhythm  Abdomen: Soft, nontender  Extremities: No edema  Skin: Warm and dry  Neurologic: Mental status: Alert, oriented, thought content appropriate     Labs: I have personally reviewed pertinent lab results  , CBC: No results found for: WBC, HGB, HCT, MCV, PLT, ADJUSTEDWBC, MCH, MCHC, RDW, MPV, NRBC, CMP: No results found for: SODIUM, K, CL, CO2, ANIONGAP, BUN, CREATININE, GLUCOSE, CALCIUM, AST, ALT, ALKPHOS, PROT, BILITOT, EGFR  Imaging and other studies: I have personally reviewed pertinent reports     and I have personally reviewed pertinent films in PACS

## 2020-12-17 NOTE — PROGRESS NOTES
Pt sating at 86% on 5L, O2 titrated to 8 then 10, however his max was 88%- he was placed on 15l and now sating 88-93%  SLIM aware-Will continue to monitor

## 2020-12-17 NOTE — ASSESSMENT & PLAN NOTE
· Pt noted to have fluctuations in HR overnight on 12/13, new onset afib  · Cardiology following,  · Continue Cardizem, transition to CD   · PRN lopressor for HR > 130 BPM  · TSH 0 34 with mild elevated T4, obtain free T3   · CHADSVASC 4, started on Eliquis

## 2020-12-17 NOTE — ASSESSMENT & PLAN NOTE
· Pt presented with generalized weakness and increased confusion, slowly improving   · COVID (+)   · Initially requiring 5 L NC, now remains on 15 L [address], tried to wean down again today unsuccessfully  · Pulmonary following, appreciate recommendations   · Continue COVID moderate pathway,   · Completed 5 days of IV Remdesivir on 12/14   · Continue IV Decadron (day 8/10)  · s/p covalescent plasma 12/10  · Procalcitonin negative x2, IV antibiotics discontinued  · Vit C, Zinc, Vit D supplementation x 7 days then MVI  · Lipitor 40 mg daily   · Pepcid 20 mg daily  · Cleared by neurosurgery/neurolgoy for PO Eliquis 5 mg BID in setting of new onset afib  · D-dimer is < 2 5  · Inflammatory markers improving  · Consideration for Actemra if respiratory status worsens further as IL-6 is elevated 84 5  · Discussed with patient regarding code status, he is currently alert and oriented and CONFIRMED desire to be down graded to DNR/DNI status     · Depending on clinical course, if worsening hypoxia would consider palliative care consultation for goals of care with patient

## 2020-12-17 NOTE — ASSESSMENT & PLAN NOTE
· Pt initially requiring 5 L NC on admission with positive COVID screen   · Appreciate pulmonary recommendations, critical notified of increased O2 demands 12/15  · Attempt to keep O2 saturations > 88%  · Currently on 15 L midflow, wean as able

## 2020-12-17 NOTE — ASSESSMENT & PLAN NOTE
· Pt with recent hx of stroke x a few months ago followed by SDH thought to be traumatic in setting of falls at home   · Repeat CT head obtained here that was unremarkable   · Baseline left sided weakness  · Continue fall precautions  · Neurology evaluated,  · Discussed with neurosurgery regarding starting anticoagulation in setting of COVID status and new onset a   Fib with elevated chadsvasc score, pt cleared to start a/c, will start on PO Eliquis 5 mg BID; discontinued aspirin  · PT/OT consultations once clinically improved for disposition   · Monitor

## 2020-12-17 NOTE — PROGRESS NOTES
Progress Note - Rosa Mercedes 1950, 65 y o  male MRN: [id number]    Unit/Bed#: -01 Encounter: [id number]    Primary Care Provider: Donald Mcclendon MD   Date and time admitted to hospital: 12/10/2020 12:59 PM    * COVID-19 virus infection  Assessment & Plan  · Pt presented with generalized weakness and increased confusion, slowly improving   · COVID (+)   · Initially requiring 5 L NC, now remains on 15 L [address], tried to wean down again today unsuccessfully  · Pulmonary following, appreciate recommendations   · Continue COVID moderate pathway,   · Completed 5 days of IV Remdesivir on 12/14   · Continue IV Decadron (day 8/10)  · s/p covalescent plasma 12/10  · Procalcitonin negative x2, IV antibiotics discontinued  · Vit C, Zinc, Vit D supplementation x 7 days then MVI  · Lipitor 40 mg daily   · Pepcid 20 mg daily  · Cleared by neurosurgery/neurolgoy for PO Eliquis 5 mg BID in setting of new onset afib  · D-dimer is < 2 5  · Inflammatory markers improving  · Consideration for Actemra if respiratory status worsens further as IL-6 is elevated 84 5  · Discussed with patient regarding code status, he is currently alert and oriented and CONFIRMED desire to be down graded to DNR/DNI status     · Depending on clinical course, if worsening hypoxia would consider palliative care consultation for goals of care with patient    COPD (chronic obstructive pulmonary disease) ([facility] )  Assessment & Plan  · Not typically maintained on supplemental O2 as an outpatient   · Pulmonary following,  · Continue IV Decadron 6 mg daily (day 8/10)  · Continue PRN albuterol   · Supplemental O2 as above    Acute respiratory failure with hypoxia ([facility] )  Assessment & Plan  · Pt initially requiring 5 L NC on admission with positive COVID screen   · Appreciate pulmonary recommendations, critical notified of increased O2 demands 12/15  · Attempt to keep O2 saturations > 88%  · Currently on 15 L midflow, wean as able    CVA (cerebral vascular accident) [facility])  [address] [facility]  · Pt with recent hx of stroke x a few months ago followed by [facility] thought to be traumatic in setting of falls at home   · Repeat CT head obtained here that was unremarkable   · Baseline left sided weakness  · Continue fall precautions  · Neurology evaluated,  · Discussed with neurosurgery regarding starting anticoagulation in setting of COVID status and new onset a  Fib with elevated chadsvasc score, pt cleared to start a/c, will start on PO Eliquis 5 mg BID; discontinued aspirin  · PT/OT consultations once clinically improved for disposition   · Monitor     Acute metabolic encephalopathy  Assessment & Plan  · Likely secondary to COVID-19 infection  · Continue to monitor clinically  · Improved, patient appears A+O x3    New onset atrial fibrillation ([address] )  Assessment & Plan  · Pt noted to have fluctuations in HR overnight on 12/13, new onset afib  · Cardiology following,  · Continue Cardizem, transition to CD   · PRN lopressor for HR > 130 BPM  · TSH 0 34 with mild elevated T4, obtain free T3   · CHADSVASC 4, started on Eliquis     Positive blood culture  Assessment & Plan  · 1/2 BC (+) for staph coag negative   · Likely contaminant, additional BC negative x 5 days  · Continue to monitor         VTE Pharmacologic Prophylaxis:   Pharmacologic: Apixaban (Eliquis)  Mechanical VTE Prophylaxis in Place: Yes    Patient Centered Rounds: I have evaluated patient without nursing staff present due to d/w nurse independently    Discussions with Specialists or Other Care Team Provider: CM, pulm    Education and Discussions with Family / Patient: patient     Time Spent for Care: 15 minutes  More than 50% of total time spent on counseling and coordination of care as described above      Current Length of Stay: 7 day(s)    Current Patient Status: Inpatient   Certification Statement: The patient will continue to require additional inpatient hospital stay due to Midflow O2 requirement    Discharge Plan: pending improvement 48-72 hrs     Code Status: Level 3 - DNAR and DNI      Subjective:   Patient seen through window, doing well  Denies shortness of breath today  Feels "fine"     Objective:     Vitals:   Temp (24hrs), Av 5 °F (36 4 °C), Min:97 4 °F (36 3 °C), Max:97 5 °F (36 4 °C)    Temp:  [97 4 °F (36 3 °C)-97 5 °F (36 4 °C)] 97 5 °F (36 4 °C)  HR:  [64-80] 78  Resp:  [20-21] 21  BP: (107-119)/(57-77) 107/58  SpO2:  [88 %-96 %] 91 %  Body mass index is 22 55 kg/m²  Input and Output Summary (last 24 hours): Intake/Output Summary (Last 24 hours) at 2020 1505  Last data filed at 2020 1331  Gross per 24 hour   Intake 1210 ml   Output 1475 ml   Net -265 ml       Physical Exam:     Physical Exam  Vitals signs and nursing note reviewed  Constitutional:       General: He is not in acute distress  Appearance: He is not ill-appearing or toxic-appearing  Cardiovascular:      Rate and Rhythm: Normal rate  Pulmonary:      Effort: Pulmonary effort is normal  No respiratory distress  Comments: diminished  Neurological:      Mental Status: He is alert and oriented to person, place, and time  Psychiatric:         Mood and Affect: Mood normal          Behavior: Behavior normal          Additional Data:     Labs:    Results from last 7 days   Lab Units 20  0543  20  0546   WBC Thousand/uL 5 35   < > 7 56   HEMOGLOBIN g/dL 11 0*   < > 10 9*   HEMATOCRIT % 33 1*   < > 32 4*   PLATELETS Thousands/uL 201   < > 193   NEUTROS PCT %  --   --  87*   LYMPHS PCT %  --   --  6*   MONOS PCT %  --   --  5   EOS PCT %  --   --  0    < > = values in this interval not displayed       Results from last 7 days   Lab Units 20  0543   SODIUM mmol/L 134*   POTASSIUM mmol/L 4 7   CHLORIDE mmol/L 101   CO2 mmol/L 22   BUN mg/dL 37*   CREATININE mg/dL 1 04   ANION GAP mmol/L 11   CALCIUM mg/dL 8 4   ALBUMIN g/dL 2 2*   TOTAL BILIRUBIN mg/dL 0 30   ALK PHOS U/L 119*   ALT U/L 41   AST U/L 24   GLUCOSE RANDOM mg/dL 389*         Results from last 7 days   Lab Units 12/17/20  1048 12/17/20  0640 12/16/20  2120 12/16/20  1545 12/16/20  1056 12/16/20  0631 12/15/20  2251 12/15/20  2113 12/15/20  1546 12/15/20  1109 12/15/20  0630 12/14/20  2036   POC GLUCOSE mg/dl 434* 228* 231* 369* 467* 416* 326* 338* 411* 397* 260* 268*     Results from last 7 days   Lab Units 12/14/20  1241   HEMOGLOBIN A1C % 6 5*     Results from last 7 days   Lab Units 12/15/20  0440 12/14/20  0546 12/11/20  0456 12/10/20  1649   PROCALCITONIN ng/ml <0 05 <0 05 0 05 0 05           * I Have Reviewed All Lab Data Listed Above    * Additional Pertinent Lab Tests Reviewed: Specific Labs Reviewed Include BG    Imaging:    Imaging Reports Reviewed Today Include:   Imaging Personally Reviewed by Myself Includes:      Recent Cultures (last 7 days):           Last 24 Hours Medication List:   Current Facility-Administered Medications   Medication Dose Route Frequency Provider Last Rate    acetaminophen  650 mg Oral Q6H PRN Shelbi Armenta MD      apixaban  5 mg Oral BID Shannon Lindsay PA-C      atorvastatin  40 mg Oral HS Shelbi Armenta MD      bisacodyl  10 mg Rectal Daily Shelbi Armenta MD      cholecalciferol  2,000 Units Oral Daily Shelbi Armenta MD      dexamethasone  6 mg Intravenous Q24H Shelbi Armenta MD      diltiazem  120 mg Oral Daily Keisha Simmons PA-C      famotidine  20 mg Oral Daily Shelbi Armenta MD      gabapentin  600 mg Oral TID Shelbi Armenta MD      insulin glargine  8 Units Subcutaneous HS Shannon Lindsay PA-C      insulin lispro  1-5 Units Subcutaneous HS Shannon Lindsay PA-C      insulin lispro  1-6 Units Subcutaneous TID AC Shannon Lindsay PA-C      insulin lispro  2 Units Subcutaneous TID With Meals Shannon Lindsay PA-C      magnesium hydroxide  30 mL Oral Daily PRN Shelbi Armenta MD      melatonin  3 mg Oral HS Symaikel Millerla, MD      metoprolol  2 5 mg Intravenous Q6H PRN Obdulia Randolph B IVET Lindsay      metoprolol tartrate  12 5 mg Oral Q12H [facility], IVET      [START ON 12/18/2020] multivitamin-minerals  1 tablet Oral Daily Catherine Delgado MD      nicotine  1 patch Transdermal Daily Catherine Delgado MD      ondansetron  4 mg Intravenous Q6H PRN Catherine Delgado MD      phenol  1 spray Mouth/Throat Q2H PRN Tessy Coleman PA-C      traZODone  25 mg Oral HS Catherine Delgado MD          Today, Patient Was Seen By: Tessy Coleman PA-C    ** Please Note: Dictation voice to text software may have been used in the creation of this document   **

## 2020-12-18 ENCOUNTER — APPOINTMENT (INPATIENT)
Dept: CT IMAGING | Facility: HOSPITAL | Age: 70
DRG: 177 | End: 2020-12-18
Payer: COMMERCIAL

## 2020-12-18 LAB
ANION GAP SERPL CALCULATED.3IONS-SCNC: 6 MMOL/L (ref 4–13)
BUN SERPL-MCNC: 38 MG/DL (ref 5–25)
CALCIUM SERPL-MCNC: 8.8 MG/DL (ref 8.3–10.1)
CHLORIDE SERPL-SCNC: 104 MMOL/L (ref 100–108)
CO2 SERPL-SCNC: 28 MMOL/L (ref 21–32)
CREAT SERPL-MCNC: 1.11 MG/DL (ref 0.6–1.3)
GFR SERPL CREATININE-BSD FRML MDRD: 66 ML/MIN/1.73SQ M
GLUCOSE SERPL-MCNC: 271 MG/DL (ref 65–140)
GLUCOSE SERPL-MCNC: 295 MG/DL (ref 65–140)
GLUCOSE SERPL-MCNC: 306 MG/DL (ref 65–140)
GLUCOSE SERPL-MCNC: 367 MG/DL (ref 65–140)
GLUCOSE SERPL-MCNC: 409 MG/DL (ref 65–140)
MAGNESIUM SERPL-MCNC: 2.2 MG/DL (ref 1.6–2.6)
POTASSIUM SERPL-SCNC: 5.1 MMOL/L (ref 3.5–5.3)
SODIUM SERPL-SCNC: 138 MMOL/L (ref 136–145)

## 2020-12-18 PROCEDURE — 94660 CPAP INITIATION&MGMT: CPT

## 2020-12-18 PROCEDURE — 99233 SBSQ HOSP IP/OBS HIGH 50: CPT | Performed by: PHYSICIAN ASSISTANT

## 2020-12-18 PROCEDURE — 83735 ASSAY OF MAGNESIUM: CPT | Performed by: PHYSICIAN ASSISTANT

## 2020-12-18 PROCEDURE — 82948 REAGENT STRIP/BLOOD GLUCOSE: CPT

## 2020-12-18 PROCEDURE — 97163 PT EVAL HIGH COMPLEX 45 MIN: CPT

## 2020-12-18 PROCEDURE — 70450 CT HEAD/BRAIN W/O DYE: CPT

## 2020-12-18 PROCEDURE — 97167 OT EVAL HIGH COMPLEX 60 MIN: CPT

## 2020-12-18 PROCEDURE — 80048 BASIC METABOLIC PNL TOTAL CA: CPT | Performed by: PHYSICIAN ASSISTANT

## 2020-12-18 PROCEDURE — 99232 SBSQ HOSP IP/OBS MODERATE 35: CPT | Performed by: PHYSICIAN ASSISTANT

## 2020-12-18 PROCEDURE — G1004 CDSM NDSC: HCPCS

## 2020-12-18 PROCEDURE — 94760 N-INVAS EAR/PLS OXIMETRY 1: CPT

## 2020-12-18 RX ORDER — INSULIN GLARGINE 100 [IU]/ML
14 INJECTION, SOLUTION SUBCUTANEOUS
Status: DISCONTINUED | OUTPATIENT
Start: 2020-12-18 | End: 2020-12-19

## 2020-12-18 RX ADMIN — APIXABAN 5 MG: 5 TABLET, FILM COATED ORAL at 17:41

## 2020-12-18 RX ADMIN — GABAPENTIN 600 MG: 300 CAPSULE ORAL at 08:23

## 2020-12-18 RX ADMIN — INSULIN LISPRO 3 UNITS: 100 INJECTION, SOLUTION INTRAVENOUS; SUBCUTANEOUS at 22:57

## 2020-12-18 RX ADMIN — GABAPENTIN 600 MG: 300 CAPSULE ORAL at 22:58

## 2020-12-18 RX ADMIN — APIXABAN 5 MG: 5 TABLET, FILM COATED ORAL at 08:23

## 2020-12-18 RX ADMIN — Medication 12.5 MG: at 08:23

## 2020-12-18 RX ADMIN — INSULIN GLARGINE 14 UNITS: 100 INJECTION, SOLUTION SUBCUTANEOUS at 22:57

## 2020-12-18 RX ADMIN — DILTIAZEM HYDROCHLORIDE 120 MG: 120 CAPSULE, COATED, EXTENDED RELEASE ORAL at 08:23

## 2020-12-18 RX ADMIN — MELATONIN 3 MG: at 22:57

## 2020-12-18 RX ADMIN — INSULIN LISPRO 4 UNITS: 100 INJECTION, SOLUTION INTRAVENOUS; SUBCUTANEOUS at 08:24

## 2020-12-18 RX ADMIN — Medication 1 PATCH: at 08:25

## 2020-12-18 RX ADMIN — FAMOTIDINE 20 MG: 20 TABLET ORAL at 08:23

## 2020-12-18 RX ADMIN — BISACODYL 10 MG: 10 SUPPOSITORY RECTAL at 08:25

## 2020-12-18 RX ADMIN — INSULIN LISPRO 6 UNITS: 100 INJECTION, SOLUTION INTRAVENOUS; SUBCUTANEOUS at 13:31

## 2020-12-18 RX ADMIN — GABAPENTIN 600 MG: 300 CAPSULE ORAL at 17:41

## 2020-12-18 RX ADMIN — ATORVASTATIN CALCIUM 40 MG: 40 TABLET, FILM COATED ORAL at 22:56

## 2020-12-18 RX ADMIN — Medication 1 TABLET: at 08:39

## 2020-12-18 RX ADMIN — INSULIN LISPRO 6 UNITS: 100 INJECTION, SOLUTION INTRAVENOUS; SUBCUTANEOUS at 17:43

## 2020-12-18 RX ADMIN — Medication 2000 UNITS: at 08:23

## 2020-12-18 RX ADMIN — Medication 12.5 MG: at 23:00

## 2020-12-18 RX ADMIN — DEXAMETHASONE SODIUM PHOSPHATE 6 MG: 4 INJECTION, SOLUTION INTRAMUSCULAR; INTRAVENOUS at 22:56

## 2020-12-18 RX ADMIN — TRAZODONE HYDROCHLORIDE 25 MG: 50 TABLET ORAL at 22:57

## 2020-12-18 NOTE — PROGRESS NOTES
Progress Note - Pulmonary   Karlee Reyes 65 y o  male MRN: [id number]  Unit/Bed#: -Sebastian Encounter: [id number]    Assessment:  1  Acute hypoxemic respiratory failure  2  COVID-19 pneumonia  3  Recent CVA  4  New onset AFib    Plan:  Acute hypoxemic respiratory failure secondary to COVID-19 pneumonia  Continues on moderate COVID pathway  O2 requirement has come down this morning, currently on 10 L mid flow with sats hovering around 90%  Had been on 15 L yesterday  - dexamethasone 6 mg x 10 days, day 9 of 10  - completed remdesivir, received, also plasma  - zinc, multivitamin, vitamin-C  - procalcitonin has been negative, continue to monitor off antibiotics  - inflammatory markers are trending down  Continue IS, supportive care  CXR done yesterday shows no change in right greater than left ground-glass opacity due to COVID-19 pneumonia  Mobility is limited due to his recent stroke, would benefit from being out of the bed possible to help with lung expansion  Subjective:   Patient resting in bed  Continues to deny any significant shortness of breath  Objective:     Vitals: Blood pressure 117/70, pulse 67, temperature 97 5 °F (36 4 °C), resp  rate 21, height 5' 4" (1 626 m), weight 57 9 kg (127 lb 10 3 oz), SpO2 (!) 89 %  ,Body mass index is 21 91 kg/m²        Intake/Output Summary (Last 24 hours) at 12/18/2020 1140  Last data filed at 12/17/2020 1802  Gross per 24 hour   Intake 450 ml   Output 1300 ml   Net -850 ml       Invasive Devices     Peripheral Intravenous Line            Peripheral IV 12/17/20 Right;Ventral (anterior) Forearm less than 1 day          Drain            External Urinary Catheter Medium 6 days                Physical Exam: /70   Pulse 67   Temp 97 5 °F (36 4 °C)   Resp 21   Ht 5' 4" (1 626 m)   Wt 57 9 kg (127 lb 10 3 oz)   SpO2 (!) 89%   BMI 21 91 kg/m²   General appearance: alert and oriented, in no acute distress  Eyes: PERRL  Lungs: Diminished breath sounds  Heart: regular rate and rhythm and S1, S2 normal  Abdomen: normal findings: soft, non-tender  Extremities: No edema  Skin: Warm and dry  Neurologic: Mental status: Alert, oriented, thought content appropriate, left sided weakness from recent stroke     Labs: I have personally reviewed pertinent lab results  , CBC: No results found for: WBC, HGB, HCT, MCV, PLT, ADJUSTEDWBC, MCH, MCHC, RDW, MPV, NRBC, CMP:   Lab Results   Component Value Date    SODIUM 138 12/18/2020    K 5 1 12/18/2020     12/18/2020    CO2 28 12/18/2020    BUN 38 (H) 12/18/2020    CREATININE 1 11 12/18/2020    CALCIUM 8 8 12/18/2020    EGFR 66 12/18/2020     Imaging and other studies: I have personally reviewed pertinent reports     and I have personally reviewed pertinent films in PACS

## 2020-12-18 NOTE — PHYSICAL THERAPY NOTE
Physical Therapy Evaluation     Patient's Name: Lem Saint    Admitting Diagnosis  Difficulty walking [R26 2]  Hypoxia [R09 02]  Sepsis, due to unspecified organism, unspecified whether acute organ dysfunction present ([place] [address] ) [A41 9]  Pneumonia due to COVID-19 virus [U07 1, J12 89]    Problem List  Patient Active Problem List   Diagnosis    Other hyperlipidemia    Other insomnia    Essential hypertension    Slow transit constipation    Peripheral neuropathy    DVT prophylaxis    COVID-19 virus infection    CVA (cerebral vascular accident) ([place] [address] )    COPD (chronic obstructive pulmonary disease) ([place] [address] )    Acute respiratory failure with hypoxia (HCC)    Acute metabolic encephalopathy    New onset atrial fibrillation ([place] [address] )    Positive blood culture    Hyperglycemia    Right Subdural hematoma ([place] [address] ) 09/2020    Traumatic remote Weakness of left upper extremity       Past Medical History  Past Medical History:   Diagnosis Date    Abnormality of gait and mobility     JOSHUA (acute kidney injury) ([place] [address] ) 12/11/2020    GERD (gastroesophageal reflux disease)     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     Other mechanical complication of other specified internal prosthetic devices, implants and grafts, sequela     Recurrent depressive disorder ([place] [address] )     Wedge compression fracture of t11-T12 vertebra, initial encounter for closed fracture ([place] [address] )     Wheezing        Past Surgical History  No past surgical history on file         12/18/20 1316   PT Last Visit   PT Visit Date 12/18/20   Note Type   Note type Evaluation   Pain Assessment   Pain Assessment Tool 0-10   Pain Score No Pain   Home Living   Type of Home Apartment  (senior housing)   Home Layout One level  (No MANNY)   Additional Comments Obtained from CM note   Prior Function   Level of [place] Needs assistance with ADLs and functional mobility   Lives With Alone   Receives Help From Family   ADL Assistance Needs assistance IADLs Needs assistance   Falls in the last 6 months   (unknown)   Restrictions/Precautions   Weight Bearing Precautions Per Order No   Braces or Orthoses Other (Comment)  (unknown)   Other Precautions Cognitive; Bed Alarm;Multiple lines; Fall Risk;Restraints;Contact/isolation; Airborne/isolation;Droplet precautions  (+covid)   General   Family/Caregiver Present No   Cognition   Overall Cognitive Status Impaired   Arousal/Participation Alert   Attention Difficulty attending to directions   Orientation Level Oriented to person;Oriented to place; Disoriented to time;Disoriented to situation   Memory Decreased recall of precautions;Decreased recall of recent events;Decreased short term memory   Following Commands Follows one step commands with increased time or repetition   Comments Pt agreeable to PT    RUE Assessment   RUE Assessment   (defer to OT assessment)   LUE Assessment   LUE Assessment   (defer to OT assessment)   RLE Assessment   RLE Assessment X   Strength RLE   RLE Overall Strength 3/5   LLE Assessment   LLE Assessment X   Strength LLE   LLE Overall Strength 2+/5   Light Touch   RLE Light Touch Grossly intact   LLE Light Touch Grossly intact   Bed Mobility   Rolling R 3  Moderate assistance   Additional items Assist x 2;HOB elevated; Bedrails; Increased time required;Verbal cues;LE management   Rolling L 3  Moderate assistance   Additional items Assist x 2;HOB elevated; Bedrails; Increased time required;Verbal cues;LE management   Supine to Sit 3  Moderate assistance   Additional items Assist x 2;HOB elevated; Bedrails; Increased time required;Verbal cues;LE management   Sit to Supine 3  Moderate assistance   Additional items Assist x 2;HOB elevated; Bedrails; Increased time required;Verbal cues;LE management   Transfers   Sit to Stand 4  Minimal assistance   Additional items Assist x 2;Armrests; Increased time required;Verbal cues   Stand to Sit 4  Minimal assistance   Additional items Assist x 2;Armrests; Increased time required;Verbal cues   Ambulation/Elevation   Gait pattern Not appropriate; Not tested   Balance   Static Sitting Fair -   Dynamic Sitting Poor   Static Standing Poor   Endurance Deficit   Endurance Deficit Yes   Activity Tolerance   Activity Tolerance Patient limited by fatigue   Medical Staff Made Aware OT Mainsh Escamilla   Nurse Made Aware Discussed case with MAYITO Barry, made aware of session outcomes; post session pt was left supine in bed in [facility], all belongings within reach, +bed alarm   Assessment   Prognosis Fair   Problem List Decreased strength;Decreased endurance; Impaired balance;Decreased mobility; Decreased cognition;Decreased safety awareness   Assessment Pt is 65year old male seen for PT evaluation s/p admit to [facility] on 12/10/2020 with COVID-19 virus infection  PT consulted to assess pt's functional mobility and d/c needs  Order placed for PT eval and tx, with activity as tolerated order  Comorbidities affecting pt's physical performance at time of assessment include essential hypertension, CVA, COPD, acute respiratory failure with hypoxia, acute metabolic encephalopathy, new onset atrial fibrillation, right subdural hematoma, and traumatic remote weakness of left upper extremity  PTA, pt was requiring assist for mobility  Personal factors affecting pt at time of IE include: inaccessible home environment, inability to ambulate household distances, inability to navigate community distances, inability to navigate level surfaces without external assistance, unable to perform dynamic tasks in community, decreased cognition, limited home support, inability to perform IADLs and inability to perform ADLs   Please find objective findings from PT assessment regarding body systems outlined above with impairments and limitations including weakness, impaired balance, decreased endurance, inability to ambulate, decreased activity tolerance, decreased functional mobility tolerance, decreased safety awareness, fall risk and decreased cognition  The following objective measures performed on IE also reveal limitations: Barthel Index: 25/100 and Modified Marisol: 4 (moderate/severe disability)  Pt's clinical presentation is currently unstable/unpredictable seen in pt's presentation of need for ongoing medical management/monitoring, pt is a fall risk, and pt requires cues/assist for safety with all functional mobility  Pt to benefit from continued PT tx to address deficits as defined above and maximize level of functional independent mobility and consistency  From PT/mobility standpoint, recommendation at time of d/c would be STR pending progress in order to facilitate return to PLOF  Barriers to Discharge Inaccessible home environment;Decreased caregiver support   Goals   Patient Goals none expressed   STG Expiration Date 12/28/20   Short Term Goal #1 In 7-10 days: Increase bilateral LE strength 1/2 grade to facilitate independent mobility, Perform all bed mobility tasks with min A of 1 to decrease caregiver burden, Perform all transfers with min A of 1 to improve independence, Ambulate > 10 ft  with RW with mod A of 1 w/o LOB and w/ normalized gait pattern 100% of the time and Increase all balance 1/2 grade to decrease risk for falls   Plan   Treatment/Interventions Functional transfer training;LE strengthening/ROM; Therapeutic exercise; Endurance training;Cognitive reorientation;Patient/family training;Bed mobility;Gait training;Spoke to nursing;OT   PT Frequency Other (Comment)  (3-5x/wk)   Recommendation   PT Discharge Recommendation Post-Acute Rehabilitation Services   PT - OK to Discharge Yes  (when medically cleared, if to STR)   Modified Smyth Scale   Modified Smyth Scale 4   Barthel Index   Feeding 5   Bathing 0   Grooming Score 0   Dressing Score 0   Bladder Score 5   Bowels Score 5   Toilet Use Score 5   Transfers (Bed/Chair) Score 5   Mobility (Level Surface) Score 0   Stairs Score 0   Barthel Index Score 25 Pj Giles, PT, DPT

## 2020-12-18 NOTE — PLAN OF CARE
Problem: OCCUPATIONAL THERAPY ADULT  Goal: Performs self-care activities at highest level of function for planned discharge setting  See evaluation for individualized goals  Description: Treatment Interventions: ADL retraining, Functional transfer training, UE strengthening/ROM, Endurance training, Cognitive reorientation, Patient/family training, Equipment evaluation/education, Compensatory technique education, Continued evaluation, Energy conservation, Activityengagement          See flowsheet documentation for full assessment, interventions and recommendations  Note: Limitation: Decreased ADL status, Decreased UE ROM, Decreased UE strength, Decreased Safe judgement during ADL, Decreased cognition, Decreased endurance, Decreased self-care trans, Decreased fine motor control, Decreased high-level ADLs, Non-func L UE  Prognosis: Good  Assessment: Patient is a 65 y o  male seen for OT evaluation s/p admit to [id number] [place] [address] on 12/10/2020 w/COVID-19 virus infection  Commorbidities affecting patient's functional performance at time of assessment include:Acute metabolic encephalopathy, acute respiratory failure with hypoxia, COPD, CVA  With Left sided weakness, essential hypertension  Orders placed for OT evaluation and treatment  Performed at least two patient identifiers during session including name and wristband  Prior to admission, Patient unable to provide complete history  Chart review indicates- CM Management Note - obtained  from patient's sister Christina Durbin- " patient lives alone in his senior apartment  Patient recently came home from being at the [employer] in [place] for five weeks  Patient recently came home on Wednesday prior to this hospitalization  Christina Durbin states patient is too weak to do anything for himself at home    There are no stairs to enter from outside " Personal factors affecting patient at time of initial evaluation include: limited caregiver support, decreased initiation and engagement, difficulty performing ADLs and difficulty performing IADLs  Upon evaluation, patient requires moderate and maximal assist for UB ADLs, total assist for LB ADLs Patient is alert, oriented to name,, oriented to place,, disoriented to time, and disoriented to situation,  Occupational performance is affected by the following deficits: orientation, attention span, irritability, decreased functional use of BUEs, in hand manipulation deficit with impaired reach, grasp and coordination, decreased muscle strength, degenerative arthritic joint changes, impaired gross motor coordination, impaired fine motor coordination, dynamic sit/ stand balance deficit with poor standing tolerance time for self care and functional mobility, decreased activity tolerance, decreased safety awareness and postural control and postural alignment deficit, requiring external assistance to complete transitional movements  Patient to benefit from continued Occupational Therapy treatment while in the hospital to address deficits as defined above and maximize level of functional independence with ADLs and functional mobility  Occupational Performance areas to address include: eating, grooming , bathing/ shower, dressing, toilet hygiene, transfer to all surfaces, functional mobility, emergency response, health maintenance, IADLs: safety procedures, IADLs: meal prep/ clean up, Leisure Participation and Social participation        OT Discharge Recommendation: Post-Acute Rehabilitation Services

## 2020-12-18 NOTE — PLAN OF CARE
Problem: Potential for Falls  Goal: Patient will remain free of falls  Description: INTERVENTIONS:  - Assess patient frequently for physical needs  -  Identify cognitive and physical deficits and behaviors that affect risk of falls    -  [place] fall precautions as indicated by assessment   - Educate patient/family on patient safety including physical limitations  - Instruct patient to call for assistance with activity based on assessment  - Modify environment to reduce risk of injury  - Consider OT/PT consult to assist with strengthening/mobility  Outcome: Progressing     Problem: PAIN - ADULT  Goal: Verbalizes/displays adequate comfort level or baseline comfort level  Description: Interventions:  - Encourage patient to monitor pain and request assistance  - Assess pain using appropriate pain scale  - Administer analgesics based on type and severity of pain and evaluate response  - Implement non-pharmacological measures as appropriate and evaluate response  - Consider cultural and social influences on pain and pain management  - Notify physician/advanced practitioner if interventions unsuccessful or patient reports new pain  Outcome: Progressing     Problem: INFECTION - ADULT  Goal: Absence or prevention of progression during hospitalization  Description: INTERVENTIONS:  - Assess and monitor for signs and symptoms of infection  - Monitor lab/diagnostic results  - Monitor all insertion sites, i e  indwelling lines, tubes, and drains  - Monitor endotracheal if appropriate and nasal secretions for changes in amount and color  - Ehrenberg appropriate cooling/warming therapies per order  - Administer medications as ordered  - Instruct and encourage patient and family to use good hand hygiene technique  - Identify and instruct in appropriate isolation precautions for identified infection/condition  Outcome: Progressing  Goal: Absence of fever/infection during neutropenic period  Description: INTERVENTIONS:  - Monitor WBC    Outcome: Progressing     Problem: SAFETY ADULT  Goal: Patient will remain free of falls  Description: INTERVENTIONS:  - Assess patient frequently for physical needs  -  Identify cognitive and physical deficits and behaviors that affect risk of falls    -  [place] fall precautions as indicated by assessment   - Educate patient/family on patient safety including physical limitations  - Instruct patient to call for assistance with activity based on assessment  - Modify environment to reduce risk of injury  - Consider OT/PT consult to assist with strengthening/mobility  Outcome: Progressing  Goal: Maintain or return to baseline ADL function  Description: INTERVENTIONS:  -  Assess patient's ability to carry out ADLs; assess patient's baseline for ADL function and identify physical deficits which impact ability to perform ADLs (bathing, care of mouth/teeth, toileting, grooming, dressing, etc )  - Assess/evaluate cause of self-care deficits   - Assess range of motion  - Assess patient's mobility; develop plan if impaired  - Assess patient's need for assistive devices and provide as appropriate  - Encourage maximum independence but intervene and supervise when necessary  - Involve family in performance of ADLs  - Assess for home care needs following discharge   - Consider OT consult to assist with ADL evaluation and planning for discharge  - Provide patient education as appropriate  Outcome: Progressing  Goal: Maintain or return mobility status to optimal level  Description: INTERVENTIONS:  - Assess patient's baseline mobility status (ambulation, transfers, stairs, etc )    - Identify cognitive and physical deficits and behaviors that affect mobility  - Identify mobility aids required to assist with transfers and/or ambulation (gait belt, sit-to-stand, lift, walker, cane, etc )  - [place] fall precautions as indicated by assessment  - Record patient progress and toleration of activity level on Mobility SBAR; progress patient to next Phase/Stage  - Instruct patient to call for assistance with activity based on assessment  - Consider rehabilitation consult to assist with strengthening/weightbearing, etc   Outcome: Progressing     Problem: DISCHARGE PLANNING  Goal: Discharge to home or other facility with appropriate resources  Description: INTERVENTIONS:  - Identify barriers to discharge w/patient and caregiver  - Arrange for needed discharge resources and transportation as appropriate  - Identify discharge learning needs (meds, wound care, etc )  - Arrange for interpretive services to assist at discharge as needed  - Refer to Case Management Department for coordinating discharge planning if the patient needs post-hospital services based on physician/advanced practitioner order or complex needs related to functional status, cognitive ability, or social support system  Outcome: Progressing     Problem: Knowledge Deficit  Goal: Patient/family/caregiver demonstrates understanding of disease process, treatment plan, medications, and discharge instructions  Description: Complete learning assessment and assess knowledge base    Interventions:  - Provide teaching at level of understanding  - Provide teaching via preferred learning methods  Outcome: Progressing     Problem: Prexisting or High Potential for Compromised Skin Integrity  Goal: Skin integrity is maintained or improved  Description: INTERVENTIONS:  - Identify patients at risk for skin breakdown  - Assess and monitor skin integrity  - Assess and monitor nutrition and hydration status  - Monitor labs   - Assess for incontinence   - Turn and reposition patient  - Assist with mobility/ambulation  - Relieve pressure over bony prominences  - Avoid friction and shearing  - Provide appropriate hygiene as needed including keeping skin clean and dry  - Evaluate need for skin moisturizer/barrier cream  - Collaborate with interdisciplinary team   - Patient/family teaching  - Consider wound care consult   Outcome: Progressing

## 2020-12-18 NOTE — ASSESSMENT & PLAN NOTE
· Likely secondary to COVID-19 infection  · Continue to monitor clinically  · He was improved, today he is more aggressive and not quite as oriented, will get stat CT head in the setting of new onset anticoagulation

## 2020-12-18 NOTE — ASSESSMENT & PLAN NOTE
· Noted to have hypotension on admission, now stable  · Significantly improved, stable  · Continue routine vital signs

## 2020-12-18 NOTE — ASSESSMENT & PLAN NOTE
· Remains hypoxic at times, removing O2 supplement compounding his issues  · Appreciate pulmonary recommendations, critical notified of increased O2 demands 12/15  · Attempt to keep O2 saturations > 88%  · Currently on 15 L midflow, wean as able  · Patient is currently in soft wrist restraints as he continuously is pulling off his oxygen and not redirectable

## 2020-12-18 NOTE — ASSESSMENT & PLAN NOTE
· Pt presented with generalized weakness and increased confusion, he is stable but still requires 10-15 L via midflow  Initially requiring 5 L NC  · Pulmonary following, appreciate recommendations   · Continue COVID moderate pathway,   · Completed 5 days of IV Remdesivir on 12/14   · Continue IV Decadron 10 days through 12/19   · s/p covalescent plasma 12/10  · Procalcitonin negative x2, IV antibiotics discontinued  · Vit C, Zinc, Vit D supplementation x 7 days now on daily MVI  · Lipitor 40 mg daily, Pepcid 20 mg daily  · Cleared by neurosurgery/neurolgoy for PO Eliquis 5 mg BID in setting of new onset afib  · D-dimer is < 2 5  · Inflammatory markers improving  · Consideration for Actemra if respiratory status worsens further as IL-6 is elevated 84 5  · Discussed with patient regarding code status when alert/oriented, CONFIRMED desire to be DNR/DNI status     · Depending on clinical course, if worsening hypoxia would consider palliative care consultation for goals of care with patient

## 2020-12-18 NOTE — ASSESSMENT & PLAN NOTE
· Not typically maintained on supplemental O2 as an outpatient   · Pulmonary following,  · Continue IV Decadron as above  · Continue PRN albuterol   · Supplemental O2 as above

## 2020-12-18 NOTE — ASSESSMENT & PLAN NOTE
· A1c slightly elevated 6 5%  · Likely worsening hyperglycemia in setting of IV steroid therapy   · Increased Lantus to 14 units QHS 12/18  · Increased Humalog to 6 units scheduled with SSI coverage #1  · QID glucose checks  · Consistent carb diet  · Monitor and adjust regimen as needed  Recent Labs     12/17/20  0640 12/17/20  1048 12/17/20  1557 12/17/20  2042 12/18/20  0618 12/18/20  1136   POCGLU 228* 434* 406* 362* 295* 367*

## 2020-12-18 NOTE — PROGRESS NOTES
Progress Note Oliver Sousa 1950, 65 y o  male MRN: [id number]    Unit/Bed#: -01 Encounter: [id number]    Primary Care Provider: Leela Burgess MD   Date and time admitted to hospital: 12/10/2020 12:59 PM      * COVID-19 virus infection  Assessment & Plan  · Pt presented with generalized weakness and increased confusion, he is stable but still requires 10-15 L via midflow  Initially requiring 5 L NC  · Pulmonary following, appreciate recommendations   · Continue COVID moderate pathway,   · Completed 5 days of IV Remdesivir on 12/14   · Continue IV Decadron 10 days through 12/19   · s/p covalescent plasma 12/10  · Procalcitonin negative x2, IV antibiotics discontinued  · Vit C, Zinc, Vit D supplementation x 7 days now on daily MVI  · Lipitor 40 mg daily, Pepcid 20 mg daily  · Cleared by neurosurgery/neurolgoy for PO Eliquis 5 mg BID in setting of new onset afib  · D-dimer is < 2 5  · Inflammatory markers improving  · Consideration for Actemra if respiratory status worsens further as IL-6 is elevated 84 5  · Discussed with patient regarding code status when alert/oriented, CONFIRMED desire to be DNR/DNI status     · Depending on clinical course, if worsening hypoxia would consider palliative care consultation for goals of care with patient    COPD (chronic obstructive pulmonary disease) ([facility] )  Assessment & Plan  · Not typically maintained on supplemental O2 as an outpatient   · Pulmonary following,  · Continue IV Decadron as above  · Continue PRN albuterol   · Supplemental O2 as above    Acute respiratory failure with hypoxia ([facility] )  Assessment & Plan  · Remains hypoxic at times, removing O2 supplement compounding his issues  · Appreciate pulmonary recommendations, critical notified of increased O2 demands 12/15  · Attempt to keep O2 saturations > 88%  · Currently on 15 L midflow, wean as able  · Patient is currently in soft wrist restraints as he continuously is pulling off his oxygen and not redirectable    CVA (cerebral vascular accident) ([facility] [address] )  Assessment & Plan  · Pt with recent hx of stroke x a few months ago followed by [facility] thought to be traumatic in setting of falls at home   · Repeat CT head obtained here that was unremarkable, however he has more aggressive today and not following commands quite as well; speech is at his baseline, left-sided weakness is at baseline  In the setting of starting anticoagulation will get stat CT head today 12/18  · Baseline left sided weakness, dysarthria/expressive aphasia  · Continue fall precautions  · Neurology evaluated,  · Discussed with neurosurgery regarding starting anticoagulation in setting of COVID status and new onset a   Fib with elevated chadsvasc score, pt cleared to start a/c, Eliquis 5 mg BID; discontinued aspirin  · Monitor   · PT/OT    Acute metabolic encephalopathy  Assessment & Plan  · Likely secondary to COVID-19 infection  · Continue to monitor clinically  · He was improved, today he is more aggressive and not quite as oriented, will get stat CT head in the setting of new onset anticoagulation    New onset atrial fibrillation ([facility]CC)  Assessment & Plan  · Pt noted to have fluctuations in HR overnight on 12/13, new onset afib  · Cardiology following,  · Continue Cardizem, transition to CD   · PRN lopressor for HR > 130 BPM  · TSH 0 34 with mild elevated T4, obtain free T3   · CHADSVASC 4, started on Eliquis     Positive blood culture  Assessment & Plan  · 1/2 BC (+) for staph coag negative   · Likely contaminant, additional BC negative x 5 days  · Continue to monitor     Hyperglycemia  Assessment & Plan  · A1c slightly elevated 6 5%  · Likely worsening hyperglycemia in setting of IV steroid therapy   · Increased Lantus to 14 units QHS 12/18  · Increased Humalog to 6 units scheduled with SSI coverage #1  · QID glucose checks  · Consistent carb diet  · Monitor and adjust regimen as needed  Recent Labs     12/17/20  0640 12/17/20  1040 20  1557 20  2042 20  0618 20  1136   POCGLU 228* 434* 406* 362* 295* 367*       Essential hypertension  Assessment & Plan  · Noted to have hypotension on admission, now stable  · Significantly improved, stable  · Continue routine vital signs    Other hyperlipidemia  Assessment & Plan  · Atorvastatin 40 mg due to COVID infection        VTE Pharmacologic Prophylaxis:   Pharmacologic: Apixaban (Eliquis)  Mechanical VTE Prophylaxis in Place: Yes    Patient Centered Rounds: I have performed bedside rounds with nursing staff today  Discussions with Specialists or Other Care Team Provider:  Case management, pulmonology    Education and Discussions with Family / Patient: Osbaldo chase, by phone     Time Spent for Care: 45 minutes  More than 50% of total time spent on counseling and coordination of care as described above  Current Length of Stay: 8 day(s)    Current Patient Status: Inpatient   Certification Statement: The patient will continue to require additional inpatient hospital stay due to hypoxia, O2 requirements, agitation, needs PT and OT evals    Discharge Plan: likely rehab, pending pulmonary status improvement 48-72 hrs? Code Status: Level 3 - DNAR and DNI      Subjective:   Patient seen this morning, he is just waking up  He is tired  He says I gave him a headache from waking him up  He also said he felt a little nauseous this morning  By the time the nurse went back in, the nausea and the headache were both gone  I was called by the nurse for request to apply wrist restraints  The patient has continuously been pulling off his oxygen this morning, seems less redirectable in regards to that and is causing hypoxia into the 70s  He seems a little agitated, but his speech appears to be at the baseline    He remains oriented to being in the hospital     Objective:     Vitals:   Temp (24hrs), Av 6 °F (36 4 °C), Min:97 5 °F (36 4 °C), Max:97 8 °F (36 6 °C)    Temp:  [97 5 °F (36 4 °C)-97 8 °F (36 6 °C)] 97 5 °F (36 4 °C)  HR:  [67-82] 67  Resp:  [21] 21  BP: (107-117)/(58-70) 117/70  SpO2:  [83 %-97 %] 89 %  Body mass index is 21 91 kg/m²  Input and Output Summary (last 24 hours): Intake/Output Summary (Last 24 hours) at 12/18/2020 1239  Last data filed at 12/17/2020 1802  Gross per 24 hour   Intake 450 ml   Output 1300 ml   Net -850 ml       Physical Exam:     Physical Exam  Vitals signs and nursing note reviewed  Constitutional:       General: He is not in acute distress  Appearance: He is not toxic-appearing  Cardiovascular:      Rate and Rhythm: Normal rate  Pulses: Normal pulses  Pulmonary:      Effort: Pulmonary effort is normal  No respiratory distress  Breath sounds: Decreased breath sounds and rales present  Comments: O2 via nasal cannula, mid flow  Abdominal:      General: Bowel sounds are normal  There is no distension  Palpations: Abdomen is soft  Tenderness: There is no abdominal tenderness  There is no guarding  Musculoskeletal:      Right lower leg: No edema  Left lower leg: No edema  Skin:     General: Skin is warm and dry  Coloration: Skin is not pale  Neurological:      Mental Status: He is alert  GCS: GCS eye subscore is 4  GCS verbal subscore is 5  GCS motor subscore is 6  Cranial Nerves: Dysarthria (baseline, also compound it by lack of dentures) present  Comments: Tired, but oriented; slight left facial droop is at baseline, left-sided weakness at baseline           Additional Data:     Labs:    Results from last 7 days   Lab Units 12/16/20  0543  12/14/20  0546   WBC Thousand/uL 5 35   < > 7 56   HEMOGLOBIN g/dL 11 0*   < > 10 9*   HEMATOCRIT % 33 1*   < > 32 4*   PLATELETS Thousands/uL 201   < > 193   NEUTROS PCT %  --   --  87*   LYMPHS PCT %  --   --  6*   MONOS PCT %  --   --  5   EOS PCT %  --   --  0    < > = values in this interval not displayed       Results from last 7 days   Lab Units 12/18/20  0838 12/16/20  0543   SODIUM mmol/L 138 134*   POTASSIUM mmol/L 5 1 4 7   CHLORIDE mmol/L 104 101   CO2 mmol/L 28 22   BUN mg/dL 38* 37*   CREATININE mg/dL 1 11 1 04   ANION GAP mmol/L 6 11   CALCIUM mg/dL 8 8 8 4   ALBUMIN g/dL  --  2 2*   TOTAL BILIRUBIN mg/dL  --  0 30   ALK PHOS U/L  --  119*   ALT U/L  --  41   AST U/L  --  24   GLUCOSE RANDOM mg/dL 271* 389*         Results from last 7 days   Lab Units 12/18/20  1136 12/18/20  0618 12/17/20  2042 12/17/20  1557 12/17/20  1048 12/17/20  0640 12/16/20  2120 12/16/20  1545 12/16/20  1056 12/16/20  0631 12/15/20  2251 12/15/20  2113   POC GLUCOSE mg/dl 367* 295* 362* 406* 434* 228* 231* 369* 467* 416* 326* 338*     Results from last 7 days   Lab Units 12/14/20  1241   HEMOGLOBIN A1C % 6 5*     Results from last 7 days   Lab Units 12/15/20  0440 12/14/20  0546   PROCALCITONIN ng/ml <0 05 <0 05           * I Have Reviewed All Lab Data Listed Above  * Additional Pertinent Lab Tests Reviewed:  All Labs Within Last 24 Hours Reviewed    Imaging:    Imaging Reports Reviewed Today Include: CTH pending   Imaging Personally Reviewed by Myself Includes:      Recent Cultures (last 7 days):           Last 24 Hours Medication List:   Current Facility-Administered Medications   Medication Dose Route Frequency Provider Last Rate    acetaminophen  650 mg Oral Q6H PRN Shelbi Armenta MD      apixaban  5 mg Oral BID Shannon Lindsay PA-C      atorvastatin  40 mg Oral HS Shelbi Armenta MD      bisacodyl  10 mg Rectal Daily Shelbi Armenta MD      cholecalciferol  2,000 Units Oral Daily Shelbi Armenta MD      dexamethasone  6 mg Intravenous Q24H Shelbi Armenta MD      diltiazem  120 mg Oral Daily Keisha Simmons PA-C      famotidine  20 mg Oral Daily Shelbi Armenta MD      gabapentin  600 mg Oral TID Shelbi Armenta MD      insulin glargine  14 Units Subcutaneous HS Keisha Simmons PA-C      insulin lispro  1-5 Units Subcutaneous HS Shannon ADAME IVET Lindsay      insulin lispro  1-6 Units Subcutaneous TID AC Shannon Lindsay PA-C      insulin lispro  6 Units Subcutaneous TID With Meals Keisha Simmons PA-C      magnesium hydroxide  30 mL Oral Daily PRN Catherine Delgado MD      melatonin  3 mg Oral HS Irasema Mckinney MD      metoprolol  2 5 mg Intravenous Q6H PRN Kong Lu PA-C      metoprolol tartrate  12 5 mg Oral Q12H [address] Cristinasa Armaan PA-C      multivitamin-minerals  1 tablet Oral Daily Catherine Delgado MD      nicotine  1 patch Transdermal Daily Catherine Delgado MD      ondansetron  4 mg Intravenous Q6H PRN Catherine Delgado MD      phenol  1 spray Mouth/Throat Q2H PRN Tessy Coleman PA-C      traZODone  25 mg Oral HS Catherine Delgado MD          Today, Patient Was Seen By: Tessy Coleman PA-C    ** Please Note: Dictation voice to text software may have been used in the creation of this document   **

## 2020-12-18 NOTE — PROGRESS NOTES
Pt removed O2 dipping into the 70s education provided however the behavior continued  Restraints applied- SLIM aware  Pt now on 15L, will continue weaning went SpO2 stabilizes

## 2020-12-18 NOTE — OCCUPATIONAL THERAPY NOTE
Occupational Therapy Evaluation        Patient Name: Morena LUNA Date: 12/18/2020 12/18/20 1331   Note Type   Note type Evaluation   Restrictions/Precautions   Weight Bearing Precautions Per Order No   Braces or Orthoses Other (Comment)  (unknown)   Other Precautions Cognitive; Bed Alarm;Multiple lines; Fall Risk;Restraints   Pain Assessment   Pain Assessment Tool Pain Assessment not indicated - pt denies pain   Pain Score No Pain   Home Living   Type of Home Apartment  (Senior housing)   Home Layout One level  (No MANNY)   Additional Comments Home set up obtained from  CM Note  Prior Function   Level of [place] Needs assistance with ADLs and functional mobility   Lives With Alone   Receives Help From Family   ADL Assistance Needs assistance   IADLs Needs assistance   Falls in the last 6 months   (unknown)   Lifestyle   Autonomy Patient unable to provide complete history  Chart review indicates- CM Management Note - obtained  from patient's sister Carlos Kiran- " patient lives alone in his senior apartment  Patient recently came home from being at the [facility] in [place] for five weeks  Patient recently came home on Wednesday prior to this hospitalization  Carlos Kiran states patient is too weak to do anything for himself at home    There are no stairs to enter from outside "   Reciprocal Relationships Supportive sister   Psychosocial   Psychosocial (WDL) X   Patient Behaviors/Mood Irritable;Verbal;Sad   Ability to Express Feelings Needs assistance   Ability to Express Needs Needs assistance   Ability to Express Thoughts Needs assistance   Ability to Understand Others Usually understands   ADL   Eating Assistance 4  Minimal Assistance   Grooming Assistance 2  Maximal Assistance   UB Bathing Assistance 2  Maximal Assistance   LB Bathing Assistance 1  Total Assistance   [address] 2  Maximal Assistance   LB Dressing Assistance 1  Total [address]  2  Maximal Assistance   Functional Assistance 2  Maximal Assistance   Bed Mobility   Rolling R 3  Moderate assistance   Additional items Assist x 2;HOB elevated; Bedrails; Increased time required;Verbal cues   Rolling L 3  Moderate assistance   Additional items Assist x 2;HOB elevated; Increased time required;Verbal cues;LE management   Supine to Sit 3  Moderate assistance   Additional items Assist x 2; Increased time required;Verbal cues   Additional Comments requires min assist to maintain static sitting position at edge of bed   Transfers   Sit to Stand 4  Minimal assistance   Additional items Assist x 2; Increased time required;Verbal cues   Stand to Sit 4  Minimal assistance   Additional items Assist x 2; Increased time required;Verbal cues   Functional Mobility   Functional Mobility 3  Moderate assistance   Additional Comments assist of 2- unable to complete LE weight shifting to advance step forward or sideways despite level of assistance  Additional items Rolling walker   Balance   Static Sitting Fair -   Dynamic Sitting Poor   Static Standing Poor +   Activity Tolerance   Activity Tolerance Patient limited by fatigue   Nurse Made Aware RN Jhonny made aware of therapy outcome  RUE Assessment   RUE Assessment X  (limited functional use of RUE)   RUE Strength   RUE Overall Strength Deficits  (3+/5)   LUE Assessment   LUE Assessment X  (non functional use of LUE/ left da/ flaccid?)   Hand Function   Gross Motor Coordination Impaired   Fine Motor Coordination Impaired   Sensation   Light Touch Partial deficits in the LUE   Vision-Basic Assessment   Current Vision Does not wear glasses   Cognition   Overall Cognitive Status Impaired   Arousal/Participation Alert;Arousable   Attention Difficulty attending to directions   Orientation Level Oriented to person;Oriented to place; Disoriented to time;Disoriented to situation   Memory Decreased recall of precautions;Decreased recall of recent events;Decreased short term memory Following Commands Follows one step commands with increased time or repetition   Assessment   Limitation Decreased ADL status; Decreased UE ROM; Decreased UE strength;Decreased Safe judgement during ADL;Decreased cognition;Decreased endurance;Decreased self-care trans;Decreased fine motor control;Decreased high-level ADLs; Non-func L UE   Prognosis Good   Assessment Patient is a 65 y o  male seen for OT evaluation s/p admit to [address] [facility] on 12/10/2020 w/COVID-19 virus infection  Commorbidities affecting patient's functional performance at time of assessment include:Acute metabolic encephalopathy, acute respiratory failure with hypoxia, COPD, CVA  With Left sided weakness, essential hypertension  Orders placed for OT evaluation and treatment  Performed at least two patient identifiers during session including name and wristband  Prior to admission, Patient unable to provide complete history  Chart review indicates- CM Management Note - obtained  from patient's sister Corinne Barrios- " patient lives alone in his senior apartment  Patient recently came home from being at the [facility] in [place] for five weeks  Patient recently came home on Wednesday prior to this hospitalization  Corinne Barrios states patient is too weak to do anything for himself at home  There are no stairs to enter from outside " Personal factors affecting patient at time of initial evaluation include: limited caregiver support, decreased initiation and engagement, difficulty performing ADLs and difficulty performing IADLs   Upon evaluation, patient requires moderate and maximal assist for UB ADLs, total assist for LB ADLs Patient is alert, oriented to name,, oriented to place,, disoriented to time, and disoriented to situation,  Occupational performance is affected by the following deficits: orientation, attention span, irritability, decreased functional use of BUEs, in hand manipulation deficit with impaired reach, grasp and coordination, decreased muscle strength, degenerative arthritic joint changes, impaired gross motor coordination, impaired fine motor coordination, dynamic sit/ stand balance deficit with poor standing tolerance time for self care and functional mobility, decreased activity tolerance, decreased safety awareness and postural control and postural alignment deficit, requiring external assistance to complete transitional movements  Patient to benefit from continued Occupational Therapy treatment while in the hospital to address deficits as defined above and maximize level of functional independence with ADLs and functional mobility  Occupational Performance areas to address include: eating, grooming , bathing/ shower, dressing, toilet hygiene, transfer to all surfaces, functional mobility, emergency response, health maintenance, IADLs: safety procedures, IADLs: meal prep/ clean up, Leisure Participation and Social participation  Goals   Patient Goals none expressed   Plan   Treatment Interventions ADL retraining;Functional transfer training;UE strengthening/ROM; Endurance training;Cognitive reorientation;Patient/family training;Equipment evaluation/education; Compensatory technique education;Continued evaluation; Energy conservation; Activityengagement   Goal Expiration Date 01/01/21   OT Frequency 3-5x/wk   Recommendation   OT Discharge Recommendation Post-Acute Rehabilitation Services   AM-PAC Daily Activity Inpatient   Lower Body Dressing 1   Bathing 1   Toileting 1   Upper Body Dressing 2   Grooming 2   Eating 2   Daily Activity Raw Score 9   Barthel Index   Feeding 5   Bathing 0   Grooming Score 0   Dressing Score 0   Bladder Score 5   Bowels Score 5   Toilet Use Score 5   Transfers (Bed/Chair) Score 5   Mobility (Level Surface) Score 0   Stairs Score 0   Barthel Index Score 25   Modified [place] Scale   Modified [place] Scale 4         Occupational Therapy goals:  In 5-7 days:  1- Patient will complete self feeding task with set up assist  2- Patient will complete rolling to R/L with use of side rail and min assist x1    3- Patient will increase OOB/ sitting tolerance to 2-4 hours per day for increased participation in self care and leisure tasks with no s/s of exertion  4- Patient will verbalize and demonstrate weight shifting technique in bed and sitting position with 10% verbal cues  5- Patient will complete grooming task with set up assist  6-Patient/ Family  will demonstrate competency with UE Home Exercise Program   7- Patient will complete Interest checklist to explore participation in play/ leisure tasks for increased satisfaction and quality of life

## 2020-12-18 NOTE — PLAN OF CARE
Problem: PHYSICAL THERAPY ADULT  Goal: Performs mobility at highest level of function for planned discharge setting  See evaluation for individualized goals  Description: Treatment/Interventions: Functional transfer training, LE strengthening/ROM, Therapeutic exercise, Endurance training, Cognitive reorientation, Patient/family training, Bed mobility, Gait training, Spoke to nursing, OT          See flowsheet documentation for full assessment, interventions and recommendations  Note: Prognosis: Fair  Problem List: Decreased strength, Decreased endurance, Impaired balance, Decreased mobility, Decreased cognition, Decreased safety awareness  Assessment: Pt is 65year old male seen for PT evaluation s/p admit to [facility] on 12/10/2020 with COVID-19 virus infection  PT consulted to assess pt's functional mobility and d/c needs  Order placed for PT eval and tx, with activity as tolerated order  Comorbidities affecting pt's physical performance at time of assessment include essential hypertension, CVA, COPD, acute respiratory failure with hypoxia, acute metabolic encephalopathy, new onset atrial fibrillation, right subdural hematoma, and traumatic remote weakness of left upper extremity  PTA, pt was requiring assist for mobility  Personal factors affecting pt at time of IE include: inaccessible home environment, inability to ambulate household distances, inability to navigate community distances, inability to navigate level surfaces without external assistance, unable to perform dynamic tasks in community, decreased cognition, limited home support, inability to perform IADLs and inability to perform ADLs   Please find objective findings from PT assessment regarding body systems outlined above with impairments and limitations including weakness, impaired balance, decreased endurance, inability to ambulate, decreased activity tolerance, decreased functional mobility tolerance, decreased safety awareness, fall risk and decreased cognition  The following objective measures performed on IE also reveal limitations: Barthel Index: 25/100 and Modified Marisol: 4 (moderate/severe disability)  Pt's clinical presentation is currently unstable/unpredictable seen in pt's presentation of need for ongoing medical management/monitoring, pt is a fall risk, and pt requires cues/assist for safety with all functional mobility  Pt to benefit from continued PT tx to address deficits as defined above and maximize level of functional independent mobility and consistency  From PT/mobility standpoint, recommendation at time of d/c would be STR pending progress in order to facilitate return to PLOF  Barriers to Discharge: Inaccessible home environment, Decreased caregiver support     PT Discharge Recommendation: [id number]     PT - OK to Discharge: Yes(when medically cleared, if to STR)    See flowsheet documentation for full assessment

## 2020-12-18 NOTE — ASSESSMENT & PLAN NOTE
· Pt with recent hx of stroke x a few months ago followed by [facility] thought to be traumatic in setting of falls at home   · Repeat CT head obtained here that was unremarkable, however he has more aggressive today and not following commands quite as well; speech is at his baseline, left-sided weakness is at baseline  In the setting of starting anticoagulation will get stat CT head today 12/18  · Baseline left sided weakness, dysarthria/expressive aphasia  · Continue fall precautions  · Neurology evaluated,  · Discussed with neurosurgery regarding starting anticoagulation in setting of COVID status and new onset a   Fib with elevated chadsvasc score, pt cleared to start a/c, Eliquis 5 mg BID; discontinued aspirin  · Monitor   · PT/OT

## 2020-12-19 ENCOUNTER — APPOINTMENT (INPATIENT)
Dept: RADIOLOGY | Facility: HOSPITAL | Age: 70
DRG: 177 | End: 2020-12-19
Payer: COMMERCIAL

## 2020-12-19 LAB
BASE EXCESS BLDA CALC-SCNC: -0.2 MMOL/L
CRP SERPL QL: 11 MG/L
D DIMER PPP FEU-MCNC: 3.63 UG/ML FEU
ERYTHROCYTE [DISTWIDTH] IN BLOOD BY AUTOMATED COUNT: 16.6 % (ref 11.6–15.1)
FERRITIN SERPL-MCNC: 877 NG/ML (ref 8–388)
GLUCOSE SERPL-MCNC: 209 MG/DL (ref 65–140)
GLUCOSE SERPL-MCNC: 227 MG/DL (ref 65–140)
GLUCOSE SERPL-MCNC: 297 MG/DL (ref 65–140)
GLUCOSE SERPL-MCNC: 353 MG/DL (ref 65–140)
GLUCOSE SERPL-MCNC: 68 MG/DL (ref 65–140)
GLUCOSE SERPL-MCNC: 85 MG/DL (ref 65–140)
HCO3 BLDA-SCNC: 22.6 MMOL/L (ref 22–28)
HCT VFR BLD AUTO: 36.5 % (ref 36.5–49.3)
HGB BLD-MCNC: 12.3 G/DL (ref 12–17)
MCH RBC QN AUTO: 30.4 PG (ref 26.8–34.3)
MCHC RBC AUTO-ENTMCNC: 33.7 G/DL (ref 31.4–37.4)
MCV RBC AUTO: 90 FL (ref 82–98)
O2 CT BLDA-SCNC: 14.9 ML/DL (ref 16–23)
OXYHGB MFR BLDA: 82.3 % (ref 94–97)
PCO2 BLDA: 31.5 MM HG (ref 36–44)
PH BLDA: 7.47 [PH] (ref 7.35–7.45)
PLATELET # BLD AUTO: 213 THOUSANDS/UL (ref 149–390)
PMV BLD AUTO: 10.6 FL (ref 8.9–12.7)
PO2 BLDA: 46.2 MM HG (ref 75–129)
RBC # BLD AUTO: 4.05 MILLION/UL (ref 3.88–5.62)
SPECIMEN SOURCE: ABNORMAL
WBC # BLD AUTO: 9.93 THOUSAND/UL (ref 4.31–10.16)

## 2020-12-19 PROCEDURE — 99233 SBSQ HOSP IP/OBS HIGH 50: CPT | Performed by: ANESTHESIOLOGY

## 2020-12-19 PROCEDURE — 94760 N-INVAS EAR/PLS OXIMETRY 1: CPT

## 2020-12-19 PROCEDURE — 82948 REAGENT STRIP/BLOOD GLUCOSE: CPT

## 2020-12-19 PROCEDURE — 82728 ASSAY OF FERRITIN: CPT | Performed by: PHYSICIAN ASSISTANT

## 2020-12-19 PROCEDURE — 82805 BLOOD GASES W/O2 SATURATION: CPT | Performed by: NURSE PRACTITIONER

## 2020-12-19 PROCEDURE — 99233 SBSQ HOSP IP/OBS HIGH 50: CPT | Performed by: NURSE PRACTITIONER

## 2020-12-19 PROCEDURE — 85379 FIBRIN DEGRADATION QUANT: CPT | Performed by: PHYSICIAN ASSISTANT

## 2020-12-19 PROCEDURE — 94660 CPAP INITIATION&MGMT: CPT

## 2020-12-19 PROCEDURE — 36600 WITHDRAWAL OF ARTERIAL BLOOD: CPT

## 2020-12-19 PROCEDURE — 84145 PROCALCITONIN (PCT): CPT | Performed by: PHYSICIAN ASSISTANT

## 2020-12-19 PROCEDURE — 86140 C-REACTIVE PROTEIN: CPT | Performed by: PHYSICIAN ASSISTANT

## 2020-12-19 PROCEDURE — 85027 COMPLETE CBC AUTOMATED: CPT | Performed by: PHYSICIAN ASSISTANT

## 2020-12-19 PROCEDURE — 71045 X-RAY EXAM CHEST 1 VIEW: CPT

## 2020-12-19 RX ORDER — AMOXICILLIN 250 MG
2 CAPSULE ORAL 2 TIMES DAILY
Status: DISCONTINUED | OUTPATIENT
Start: 2020-12-19 | End: 2020-12-22

## 2020-12-19 RX ORDER — DEXTROSE MONOHYDRATE 25 G/50ML
25 INJECTION, SOLUTION INTRAVENOUS ONCE
Status: COMPLETED | OUTPATIENT
Start: 2020-12-19 | End: 2020-12-19

## 2020-12-19 RX ADMIN — ATORVASTATIN CALCIUM 40 MG: 40 TABLET, FILM COATED ORAL at 21:50

## 2020-12-19 RX ADMIN — Medication 2000 UNITS: at 09:58

## 2020-12-19 RX ADMIN — Medication 12.5 MG: at 21:50

## 2020-12-19 RX ADMIN — Medication 12.5 MG: at 09:58

## 2020-12-19 RX ADMIN — TRAZODONE HYDROCHLORIDE 25 MG: 50 TABLET ORAL at 21:50

## 2020-12-19 RX ADMIN — SODIUM CHLORIDE 4 UNITS/HR: 9 INJECTION, SOLUTION INTRAVENOUS at 17:22

## 2020-12-19 RX ADMIN — Medication 1 TABLET: at 09:57

## 2020-12-19 RX ADMIN — APIXABAN 5 MG: 5 TABLET, FILM COATED ORAL at 09:59

## 2020-12-19 RX ADMIN — MELATONIN 3 MG: at 21:50

## 2020-12-19 RX ADMIN — DILTIAZEM HYDROCHLORIDE 120 MG: 120 CAPSULE, COATED, EXTENDED RELEASE ORAL at 09:58

## 2020-12-19 RX ADMIN — INSULIN LISPRO 4 UNITS: 100 INJECTION, SOLUTION INTRAVENOUS; SUBCUTANEOUS at 08:00

## 2020-12-19 RX ADMIN — Medication 1 PATCH: at 09:59

## 2020-12-19 RX ADMIN — APIXABAN 5 MG: 5 TABLET, FILM COATED ORAL at 17:57

## 2020-12-19 RX ADMIN — GABAPENTIN 600 MG: 300 CAPSULE ORAL at 17:57

## 2020-12-19 RX ADMIN — DEXAMETHASONE SODIUM PHOSPHATE 6 MG: 4 INJECTION, SOLUTION INTRAMUSCULAR; INTRAVENOUS at 21:50

## 2020-12-19 RX ADMIN — DOCUSATE SODIUM AND SENNOSIDES 2 TABLET: 8.6; 5 TABLET ORAL at 21:54

## 2020-12-19 RX ADMIN — FAMOTIDINE 20 MG: 20 TABLET ORAL at 09:58

## 2020-12-19 RX ADMIN — INSULIN LISPRO 6 UNITS: 100 INJECTION, SOLUTION INTRAVENOUS; SUBCUTANEOUS at 12:14

## 2020-12-19 RX ADMIN — GABAPENTIN 600 MG: 300 CAPSULE ORAL at 09:57

## 2020-12-19 RX ADMIN — GABAPENTIN 600 MG: 300 CAPSULE ORAL at 21:50

## 2020-12-19 RX ADMIN — DEXTROSE MONOHYDRATE 25 ML: 500 INJECTION PARENTERAL at 21:51

## 2020-12-19 NOTE — QUICK NOTE
Called and spoke with patient's sister Deonna Murillo to provided clinical update  Informed her that secondary to patient's increasing oxygen requirements he is being transferred to SD unit for initiation of HFNC  All questions and concerns answered at that time      Duarte Valenzuela PA-C

## 2020-12-19 NOTE — QUICK NOTE
Return call from RN ABG PO2 is 46 2 discussion with ICU taking patient to O2 to put him on high-flow  ICU made aware did attempt to reach out to the patient's niece which was unsuccessful

## 2020-12-19 NOTE — ASSESSMENT & PLAN NOTE
Completed treatment under moderate pathway; now requiring       - Oxygen:Continue   - Vitamin C/Zinc/Statin: x 7 days  Complete  - Steroids Completed 10 day of dexamethasone 6 mg daily  - Convalescent Plasma: 12/11  - Remdesivir: x 4 complete  12/10-12/14  - Actemra: Will check inflammatory markers, procalcitonin  May be candidate for Actemra    - Anticoagulation:  Eliquis 5 mg b i d   - Encourage self proning/aggressive pulmonary toilet

## 2020-12-19 NOTE — PROGRESS NOTES
Progress Note Sharita Peterson 1950, 65 y o  male MRN: [id number]    Unit/Bed#: -01 Encounter: [id number]    Primary Care Provider: Wilver Urbina MD   Date and time admitted to hospital: 12/10/2020 12:59 PM        * COVID-19 virus infection  Assessment & Plan  · Pt presented with generalized weakness and increased confusion, he is stable but still requires 10-15 L via midflow  Initially requiring 5 L NC  · Pulmonary following, appreciate recommendations   · Continue COVID moderate pathway,   · Completed 5 days of IV Remdesivir on 12/14   · Continue IV Decadron 10 days through today 12/19   · s/p covalescent plasma 12/10  · Procalcitonin negative x2, IV antibiotics discontinued  · Vit C, Zinc, Vit D supplementation x 7 days now on daily MVI  · Lipitor 40 mg daily, Pepcid 20 mg daily  · Cleared by neurosurgery/neurolgoy for PO Eliquis 5 mg BID in setting of new onset afib  · D-dimer is < 2 5  · Inflammatory markers improving  · Consideration for Actemra if respiratory status worsens further as IL-6 is elevated 84 5  · Discussed with patient regarding code status when alert/oriented, CONFIRMED desire to be DNR/DNI status     · Depending on clinical course, if worsening hypoxia would consider palliative care consultation for goals of care with patient  · Will get an ABG now placed a call to ICU of the evaluate patient    Hyperglycemia  Assessment & Plan  · A1c slightly elevated 6 5%  · Likely worsening hyperglycemia in setting of IV steroid therapy   · Increased Lantus to 14 units QHS 12/18 if persist will increase 12/20 if no improved  · Increased Humalog to 6 units scheduled with SSI coverage #1  · QID glucose checks  · Consistent carb diet  · Monitor and adjust regimen as needed  Recent Labs     12/17/20  2042 12/18/20  0618 12/18/20  1136 12/18/20  1534 12/18/20  2046 12/19/20  0635   POCGLU 362* 295* 367* 409* 306* 297*       Positive blood culture  Assessment & Plan  · 1/2 BC (+) for alli hughesg negative   · Likely contaminant, additional BC negative x 5 days  · Continue to monitor     New onset atrial fibrillation (HCC)  Assessment & Plan  · Pt noted to have fluctuations in HR overnight on 12/13, new onset afib  · Cardiology following,  · Continue Cardizem, transition to CD   · PRN lopressor for HR > 130 BPM  · TSH 0 34 with mild elevated T4, obtain free T3   · CHADSVASC 4, started on Eliquis     Acute metabolic encephalopathy  Assessment & Plan  · Likely secondary to COVID-19 infection  · Continue to monitor clinically  · He was improved  · Patient had a change of mental status yesterday was taken for a CT the head that showed no acute changes finding suspect could in setting of his ongoing hypoxia with 15 L of mid flow use persist will obtain a palliative consult on Monday    Acute respiratory failure with hypoxia (HCC)  Assessment & Plan  · Remains hypoxic at times, removing O2 supplement compounding his issues  · Appreciate pulmonary recommendations, critical notified of increased O2 demands 12/15  · Attempt to keep O2 saturations > 88%  · Currently on 15 L midflow, wean as able  · Patient is currently in soft wrist restraints as he continuously is pulling off his oxygen and not redirectable    COPD (chronic obstructive pulmonary disease) ([facility] [address] )  Assessment & Plan  · Not typically maintained on supplemental O2 as an outpatient   · Pulmonary following,  · Continue IV Decadron as above  · Continue PRN albuterol   · Supplemental O2 as above    CVA (cerebral vascular accident) ([facility] [address] )  Assessment & Plan  · Pt with recent hx of stroke x a few months ago followed by [facility] thought to be traumatic in setting of falls at home   · Repeat CT head obtained here that was unremarkable, however he has more aggressive today and not following commands quite as well; speech is at his baseline, left-sided weakness is at baseline    In the setting of starting anticoagulation will get stat CT head yesterday 12/18 with no acute intracranial finding or evidence of acute bleed  · Baseline left sided weakness, dysarthria/expressive aphasia  · Continue fall precautions  · Neurology evaluated,  · Discussed with neurosurgery regarding starting anticoagulation in setting of COVID status and new onset a  Fib with elevated chadsvasc score, pt cleared to start a/c, Eliquis 5 mg BID; discontinued aspirin  · Monitor   · PT/OT    Essential hypertension  Assessment & Plan  · Noted to have hypotension on admission, now stable  · Significantly improved, stable  · Continue routine vital signs    Other hyperlipidemia  Assessment & Plan  · Atorvastatin 40 mg due to COVID infection        VTE Pharmacologic Prophylaxis:   Pharmacologic: Apixaban (Eliquis)  Mechanical VTE Prophylaxis in Place: Yes    Patient Centered Rounds: I have performed bedside rounds with nursing staff today  Discussions with Specialists or Other Team Provider:  ICU/respiratory    Education and Discussions with Family / Patient:  Attempt to reach [place] chase unsuccessful    Time Spent for Care: 40 minutes  More than 50% of total time spent on counseling and coordination of care as described above  Current Length of Stay: 9 day(s)    Current Patient Status: Inpatient   Certification Statement: The patient will continue to require additional inpatient hospital stay due to Ongoing treatment in setting of acute respiratory failure with hypoxia in setting of COVID-19 infection    Discharge Plan:  Not medically cleared    Code Status: Level 3 - DNAR and DNI      Subjective:   Patient agitated requesting to have restraints removed threatening to Whitley Soares the hospital stating were holding him against his will paranoid raising his arms patient was given a chance to come off restraints ripped oxygen off became hypoxic and apologetic in stating he he knows also want to do is help him  Patient was replaced back on oxygen restraints were put back on blood glucose was checked      Objective: Vitals:   Temp (24hrs), Av 9 °F (36 6 °C), Min:97 5 °F (36 4 °C), Max:98 5 °F (36 9 °C)    Temp:  [97 5 °F (36 4 °C)-98 5 °F (36 9 °C)] 97 5 °F (36 4 °C)  HR:  [48-89] 71  Resp:  [22-28] 22  BP: (114-124)/(61-80) 122/80  SpO2:  [89 %-96 %] 92 %  Body mass index is 19 3 kg/m²  Input and Output Summary (last 24 hours): Intake/Output Summary (Last 24 hours) at 2020 1143  Last data filed at 2020 0900  Gross per 24 hour   Intake 960 ml   Output 1000 ml   Net -40 ml       Physical Exam:     Physical Exam  HENT:      Head: Normocephalic  Mouth/Throat:      Mouth: Mucous membranes are moist    Eyes:      Pupils: Pupils are equal, round, and reactive to light  Cardiovascular:      Rate and Rhythm: Normal rate and regular rhythm  Pulmonary:      Effort: Pulmonary effort is normal       Breath sounds: Normal breath sounds  Abdominal:      General: Bowel sounds are normal    Musculoskeletal: Normal range of motion  Skin:     General: Skin is warm and dry  Neurological:      Mental Status: He is alert  He is disoriented  Psychiatric:         Mood and Affect: Affect is angry  Behavior: Behavior is agitated, aggressive and combative  Thought Content: Thought content is paranoid  Additional Data:     Labs:    Results from last 7 days   Lab Units 20  0452  20  0546   WBC Thousand/uL 9 93   < > 7 56   HEMOGLOBIN g/dL 12 3   < > 10 9*   HEMATOCRIT % 36 5   < > 32 4*   PLATELETS Thousands/uL 213   < > 193   NEUTROS PCT %  --   --  87*   LYMPHS PCT %  --   --  6*   MONOS PCT %  --   --  5   EOS PCT %  --   --  0    < > = values in this interval not displayed       Results from last 7 days   Lab Units 20  0838 20  0543   SODIUM mmol/L 138 134*   POTASSIUM mmol/L 5 1 4 7   CHLORIDE mmol/L 104 101   CO2 mmol/L 28 22   BUN mg/dL 38* 37*   CREATININE mg/dL 1 11 1 04   ANION GAP mmol/L 6 11   CALCIUM mg/dL 8 8 8 4   ALBUMIN g/dL  --  2 2*   TOTAL BILIRUBIN mg/dL  --  0 30   ALK PHOS U/L  --  119*   ALT U/L  --  41   AST U/L  --  24   GLUCOSE RANDOM mg/dL 271* 389*         Results from last 7 days   Lab Units 12/19/20  0635 12/18/20  2046 12/18/20  1534 12/18/20  1136 12/18/20  0618 12/17/20  2042 12/17/20  1557 12/17/20  1048 12/17/20  0640 12/16/20  2120 12/16/20  1545 12/16/20  1056   POC GLUCOSE mg/dl 297* 306* 409* 367* 295* 362* 406* 434* 228* 231* 369* 467*     Results from last 7 days   Lab Units 12/14/20  1241   HEMOGLOBIN A1C % 6 5*     Results from last 7 days   Lab Units 12/15/20  0440 12/14/20  0546   PROCALCITONIN ng/ml <0 05 <0 05           * I Have Reviewed All Lab Data Listed Above  * Additional Pertinent Lab Tests Reviewed:  All Labs Within Last 24 Hours Reviewed    Imaging:    Imaging Reports Reviewed Today Include:  CT the head with no acute intracranial findings      Recent Cultures (last 7 days):           Last 24 Hours Medication List:   Current Facility-Administered Medications   Medication Dose Route Frequency Provider Last Rate    acetaminophen  650 mg Oral Q6H PRN Ellie Moran MD      apixaban  5 mg Oral BID Shnanon Lindsay PA-C      atorvastatin  40 mg Oral HS Ellie Moran MD      bisacodyl  10 mg Rectal Daily Ellie Moran MD      cholecalciferol  2,000 Units Oral Daily Ellie Moran MD      dexamethasone  6 mg Intravenous Q24H Ellie Moran MD      diltiazem  120 mg Oral Daily Keisha Simmons PA-C      famotidine  20 mg Oral Daily Ellie Moran MD      gabapentin  600 mg Oral TID Ellie Moran MD      insulin glargine  14 Units Subcutaneous HS Keisha Simmons PA-C      insulin lispro  1-5 Units Subcutaneous HS Shannon Lindsay PA-C      insulin lispro  1-6 Units Subcutaneous TID AC Shannon Lindsay PA-C      insulin lispro  6 Units Subcutaneous TID With Meals Keisha Simmons PA-C      magnesium hydroxide  30 mL Oral Daily PRN Ellie Moran MD      melatonin  3 mg Oral HS Syamala Mankala, MD      metoprolol  2 5 mg Intravenous Q6H PRN Joanna Chappell PA-C      metoprolol tartrate  12 5 mg Oral Q12H [facility] Chao Crane PA-C      multivitamin-minerals  1 tablet Oral Daily Anamika Vinson MD      nicotine  1 patch Transdermal Daily Anamika Vinson MD      ondansetron  4 mg Intravenous Q6H PRN Anamika Vinson MD      phenol  1 spray Mouth/Throat Q2H PRN Roxana Osullivan PA-C      traZODone  25 mg Oral HS Anamika Vinson MD          Today, Patient Was Seen By: ZULMA Gao    ** Please Note: Dictation voice to text software may have been used in the creation of this document   **

## 2020-12-19 NOTE — ASSESSMENT & PLAN NOTE
· A1c slightly elevated 6 5%  · Likely worsening hyperglycemia in setting of IV steroid therapy   · Increased Lantus to 14 units QHS 12/18 if persist will increase 12/20 if no improved  · Increased Humalog to 6 units scheduled with SSI coverage #1  · QID glucose checks  · Consistent carb diet  · Monitor and adjust regimen as needed  Recent Labs     12/17/20  2042 12/18/20  0618 12/18/20  1136 12/18/20  1534 12/18/20  2046 12/19/20  0635   POCGLU 362* 295* 367* 409* 306* 297*

## 2020-12-19 NOTE — ASSESSMENT & PLAN NOTE
· Suspected traumatic subdural hematoma 9/2020 in setting of traumatic fall  · Left upper extremity residual weakness    · Given requirements for systemic anticoagulation case reviewed with Neurosurgery clearing patient for anticoagulant NOTE: All current Cardiology Service and Contact Information can be found at [email or web address], password: [email or web address]  Please feel free to contact me directly via text or call at [phone] with any questions or concerns.     Subjective:    Medications:  ALBUTerol/ipratropium for Nebulization 3 milliLiter(s) Nebulizer every 6 hours  allopurinol 100 milliGRAM(s) Oral daily  aspirin enteric coated 81 milliGRAM(s) Oral daily  atorvastatin 40 milliGRAM(s) Oral at bedtime  buDESOnide  80 MICROgram(s)/formoterol 4.5 MICROgram(s) Inhaler 2 Puff(s) Inhalation two times a day  chlorhexidine 2% Cloths 1 Application(s) Topical at bedtime  DOBUTamine Infusion 2.5 MICROgram(s)/kG/Min IV Continuous <Continuous>  heparin  Infusion.  Unit(s)/Hr IV Continuous <Continuous>  heparin  Injectable 9000 Unit(s) IV Push every 6 hours PRN  heparin  Injectable 4500 Unit(s) IV Push every 6 hours PRN  pantoprazole    Tablet 40 milliGRAM(s) Oral before breakfast  potassium chloride    Tablet ER 20 milliEquivalent(s) Oral once  torsemide 20 milliGRAM(s) Oral daily    Vitals:  T(C): 36.7 (19 @ 06:00), Max: 36.7 (19 @ 06:00)  HR: 112 (19 @ 08:00) (88 - 114)  ABP: 92/52 (19 @ 08:00) (80/42 - 116/68)  ABP(mean): 66 (19 @ 08:00) (54 - 84)  RR: 19 (19 @ 08:00) (7 - 31)  SpO2: 96% (19 @ 08:00) (63% - 100%)    Daily Weight in k.8 (28 Sep 2019 04:00)        I&O's Summary    27 Sep 2019 07:01  -  28 Sep 2019 07:00  --------------------------------------------------------  IN: 1239.5 mL / OUT: 1150 mL / NET: 89.5 mL        Physical Exam:  Appearance: No Acute Distress  HEENT: JVP ___ cm H2O, no HJR  Cardiovascular: RRR, Normal S1 S2, No murmurs/rubs/gallops  Respiratory: Clear to auscultation bilaterally  Gastrointestinal: Soft, Non-tender, non-distended	  Skin: no skin lesions  Neurologic: Non-focal  Extremities: No LE edema, warm and well perfused  Psychiatry: A & O x 3, Mood & affect appropriate      Labs:                        10.0   4.8   )-----------( 140      ( 28 Sep 2019 06:36 )             32.5         136  |  99  |  52<H>  ----------------------------<  125<H>  3.8   |  23  |  2.36<H>    Ca    9.4      28 Sep 2019 06:36  Phos  3.4       Mg     2.4         TPro  7.8  /  Alb  3.7  /  TBili  1.0  /  DBili  x   /  AST  25  /  ALT  32  /  AlkPhos  236<H>        PTT - ( 28 Sep 2019 06:36 )  PTT:60.1 sec      Serum Pro-Brain Natriuretic Peptide: 99377 pg/mL ( @ 18:06)        Lactate, Blood: 1.0 mmol/L ( @ 04:40)  Lactate, Blood: 1.1 mmol/L ( @ 02:25) NOTE: All current Cardiology Service and Contact Information can be found at [email or web address], password: [email or web address]  Please feel free to contact me directly via text or call at [phone] with any questions or concerns.     Subjective: No acute events overnight. No acute complaints.     Medications:  ALBUTerol/ipratropium for Nebulization 3 milliLiter(s) Nebulizer every 6 hours  allopurinol 100 milliGRAM(s) Oral daily  aspirin enteric coated 81 milliGRAM(s) Oral daily  atorvastatin 40 milliGRAM(s) Oral at bedtime  buDESOnide  80 MICROgram(s)/formoterol 4.5 MICROgram(s) Inhaler 2 Puff(s) Inhalation two times a day  chlorhexidine 2% Cloths 1 Application(s) Topical at bedtime  DOBUTamine Infusion 2.5 MICROgram(s)/kG/Min IV Continuous <Continuous>  heparin  Infusion.  Unit(s)/Hr IV Continuous <Continuous>  heparin  Injectable 9000 Unit(s) IV Push every 6 hours PRN  heparin  Injectable 4500 Unit(s) IV Push every 6 hours PRN  pantoprazole    Tablet 40 milliGRAM(s) Oral before breakfast  potassium chloride    Tablet ER 20 milliEquivalent(s) Oral once  torsemide 20 milliGRAM(s) Oral daily    Vitals:  T(C): 36.7 (19 @ 06:00), Max: 36.7 (19 @ 06:00)  HR: 112 (19 @ 08:00) (88 - 114)  ABP: 92/52 (19 @ 08:00) (80/42 - 116/68)  ABP(mean): 66 (19 @ 08:00) (54 - 84)  RR: 19 (19 @ 08:00) (7 - 31)  SpO2: 96% (19 @ 08:00) (63% - 100%)    Daily Weight in k.8 (28 Sep 2019 04:00)        I&O's Summary    27 Sep 2019 07:01  -  28 Sep 2019 07:00  --------------------------------------------------------  IN: 1239.5 mL / OUT: 1150 mL / NET: 89.5 mL        Physical Exam:  Appearance: No Acute Distress  HEENT: JVP not elevated  Cardiovascular: tachy, regular  Respiratory: Clear to auscultation bilaterally  Gastrointestinal: Soft, Non-tender, non-distended	  Skin: no skin lesions  Neurologic: Non-focal  Extremities: No LE edema, warm and well perfused  Psychiatry: A & O x 3, Mood & affect appropriate      Labs:                        10.0   4.8   )-----------( 140      ( 28 Sep 2019 06:36 )             32.5         136  |  99  |  52<H>  ----------------------------<  125<H>  3.8   |  23  |  2.36<H>    Ca    9.4      28 Sep 2019 06:36  Phos  3.4       Mg     2.4         TPro  7.8  /  Alb  3.7  /  TBili  1.0  /  DBili  x   /  AST  25  /  ALT  32  /  AlkPhos  236<H>        PTT - ( 28 Sep 2019 06:36 )  PTT:60.1 sec      Serum Pro-Brain Natriuretic Peptide: 12542 pg/mL ( @ 18:06)        Lactate, Blood: 1.0 mmol/L ( @ 04:40)  Lactate, Blood: 1.1 mmol/L ( @ 02:25)

## 2020-12-19 NOTE — PROGRESS NOTES
Progress Note Oliver Sousa 1950, 65 y o  male MRN: [id number]    Unit/Bed#: -01 Encounter: [id number]    Primary Care Provider: Leela Burgess MD   Date and time admitted to hospital: 12/10/2020 12:59 PM        * COVID-19 virus infection  Assessment & Plan  Completed treatment under moderate pathway; now requiring       - Oxygen:Continue   - Vitamin C/Zinc/Statin: x 7 days  Complete  - Steroids Completed 10 day of dexamethasone 6 mg daily  - Convalescent Plasma: 12/11  - Remdesivir: x 4 complete  12/10-12/14  - Actemra: Will check inflammatory markers, procalcitonin  May be candidate for Actemra  - Anticoagulation:  Eliquis 5 mg b i d   - Encourage self proning/aggressive pulmonary toilet      Acute respiratory failure with hypoxia (HCC)  Assessment & Plan  · Secondary to COVID-19 infection concern for superimposed bacterial infection in setting of prolonged steroids use  · Plan as noted above  · Will hold further steroid therapy  · May be candidate for Actemra  Check procalcitonin  Trend inflammatory markers  · Will hold antibiotics at this time  · Encourage good incentive spirometry/pulmonary toilet  Acute metabolic encephalopathy  Assessment & Plan  · Multifactorial in setting of baseline cognitive dysfunction, prolonged hospitalization and hypoxemia  · CT head 12/18-negative for acute intracranial abnormality/hemorrhage  · Frequent orientation  Regulate sleep/wake cycles  · Daily CAM ICU    New onset a-fib [facility])  Assessment & Plan  · Appreciate cardiology consultation/recommendations  · Metoprolol 12 5 mg b i d  · Cardizem 24 hour capsule 120 mg daily  · Continue Eliquis 5 mg b i d  For systemic anticoagulation; chads Vasc 4   Given recent fall resulting in subdural he with notable with the to discuss risk v benefit of long-term anticoagulation    Positive blood culture  Assessment & Plan  · Suspect contaminant in setting of [1/2 blood culture] Staph aureus coagulase negative     Right Subdural hematoma ([facility] [address] ) 09/2020  Assessment & Plan  · Suspected traumatic subdural hematoma 9/2020 in setting of traumatic fall  · Left upper extremity residual weakness  · Given requirements for systemic anticoagulation case reviewed with Neurosurgery clearing patient for anticoagulant    CVA (cerebral vascular accident) [facility])  Assessment & Plan  · 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  · Left upper extremity residual deficits  Essential hypertension  Assessment & Plan  · Continue metoprolol 12 5 mg b i d   · Diltiazem 24 hr capsule 120 mg daily    COPD (chronic obstructive pulmonary disease) ([facility] [address] )  Assessment & Plan  · Not in acute exacerbation  · Prn nebs as needed  Other hyperlipidemia  Assessment & Plan  · Lipitor 40 mg daily  Hyperglycemia  Assessment & Plan  · Insulin gtt      -------------------------------------------------------------------------------------------------------------  Chief Complaint: "I'm so annoyed"     History of Present Illness   HX and PE limited by:   Farooq Amin is a 65 y o  male who presented to [facility] via EMS 12/10/20 for evaluation progressive lethargy  Patient has significant past medical history of hypertension, hyperlipidemia, TIA, traumatic hemorrhagic CVA with left upper extremity residual deficits  Patient was recently discharged home from rehab after [address] 9/20  Sister noted progressive lethargy and fatigue prompting EMS evaluation  Upon arrival patient was noted to be hypotensive as well as hypoxic  Patient was confirm positive for COVID-19 infection  He was admitted to hospitalist service under moderate pathway  In addition to high-dose steroid therapy he received COVID some plasma, and remdesivir x5 in doses  Despite interventions patient remained persistently hypoxemic with increasing oxygen requirements  His course was further complicated by new onset atrial fibrillation as well as encephalopathy    Today patient more confused and combative prompting ICU evaluation  ABG was obtained revealing PaO2 of 45  At time patient saturating 90% on 15 L mid flow  He will be transferred to step-down unit for initiation of high-flow nasal cannula  History obtained from chart review  -------------------------------------------------------------------------------------------------------------  Dispo: Transfer to Stepdown Level 1     Code Status: Level 3 - DNAR and DNI  --------------------------------------------------------------------------------------------------------------  Review of Systems   Unable to perform ROS: Mental status change       Physical Exam  Vitals signs and nursing note reviewed  Constitutional:       Comments: Chronically ill-appearing   HENT:      Head: Normocephalic  Mouth/Throat:      Mouth: Mucous membranes are dry  Eyes:      Extraocular Movements: Extraocular movements intact  Pupils: Pupils are equal, round, and reactive to light  Neck:      Musculoskeletal: Normal range of motion  Cardiovascular:      Rate and Rhythm: Normal rate and regular rhythm  Pulmonary:      Effort: Pulmonary effort is normal       Comments: Coarse breath sounds bilaterally, tachypneic  Abdominal:      General: Abdomen is flat  Bowel sounds are normal  There is no distension  Musculoskeletal:      Right lower leg: No edema  Left lower leg: No edema  Skin:     General: Skin is warm  Capillary Refill: Capillary refill takes less than 2 seconds  Neurological:      Mental Status: He is alert  Comments: Alert    Oriented to person only       --------------------------------------------------------------------------------------------------------------  Vitals:   Vitals:    12/19/20 0456 12/19/20 0700 12/19/20 0756 12/19/20 1551   BP: 122/80      BP Location:       Pulse: (!) 48 71     Resp:       Temp: 97 5 °F (36 4 °C)      TempSrc:       SpO2: (!) 89% 93% 92% 93%   Weight:       Height:         Temp  Min: 96 1 °F (35 6 °C)  Max: 99 2 °F (37 3 °C)  IBW: 59 2 kg  Height: 5' 4" (162 6 cm)  Body mass index is 19 3 kg/m²  Laboratory and Diagnostics:  Results from last 7 days   Lab Units 12/19/20  0452 12/16/20  0543 12/15/20  0440 12/14/20  0546 12/13/20  0527   WBC Thousand/uL 9 93 5 35 6 60 7 56 6 64   HEMOGLOBIN g/dL 12 3 11 0* 11 3* 10 9* 10 7*   HEMATOCRIT % 36 5 33 1* 33 2* 32 4* 31 9*   PLATELETS Thousands/uL 213 201 197 193 154   NEUTROS PCT %  --   --   --  87* 87*   MONOS PCT %  --   --   --  5 5     Results from last 7 days   Lab Units 12/18/20  0838 12/16/20  0543 12/15/20  0440 12/13/20  0527   SODIUM mmol/L 138 134* 137 144   POTASSIUM mmol/L 5 1 4 7 4 7 4 5   CHLORIDE mmol/L 104 101 104 109*   CO2 mmol/L 28 22 22 22   ANION GAP mmol/L 6 11 11 13   BUN mg/dL 38* 37* 29* 30*   CREATININE mg/dL 1 11 1 04 1 02 0 96   CALCIUM mg/dL 8 8 8 4 8 7 8 8   GLUCOSE RANDOM mg/dL 271* 389* 262* 192*   ALT U/L  --  41 45 35   AST U/L  --  24 32 49*   ALK PHOS U/L  --  119* 121* 99   ALBUMIN g/dL  --  2 2* 2 3* 2 4*   TOTAL BILIRUBIN mg/dL  --  0 30 0 40 0 40     Results from last 7 days   Lab Units 12/18/20  0838   MAGNESIUM mg/dL 2 2                   ABG:  Results from last 7 days   Lab Units 12/19/20  1154   PH ART  7 474*   PCO2 ART mm Hg 31 5*   PO2 ART mm Hg 46 2*   HCO3 ART mmol/L 22 6   BASE EXC ART mmol/L -0 2   ABG SOURCE  Radial, Right     VBG:  Results from last 7 days   Lab Units 12/19/20  1154   ABG SOURCE  Radial, Right     Results from last 7 days   Lab Units 12/15/20  0440 12/14/20  0546   PROCALCITONIN ng/ml <0 05 <0 05       Micro:        EKG: NSR  Imaging: I have personally reviewed pertinent reports     and I have personally reviewed pertinent films in PACS      Historical Information   Past Medical History:   Diagnosis Date    Abnormality of gait and mobility     JOSHUA (acute kidney injury) ([facility] [address] [facility] 12/11/2020    GERD (gastroesophageal reflux disease)     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     Other mechanical complication of other specified internal prosthetic devices, implants and grafts, sequela     Recurrent depressive disorder ([facility] [address] )     Wedge compression fracture of t11-T12 vertebra, initial encounter for closed fracture ([facility] [address] )     Wheezing      No past surgical history on file  Social History   Social History     Substance and Sexual Activity   Alcohol Use Yes     Social History     Substance and Sexual Activity   Drug Use Yes    Comment: pt states "I take whatever I get a hold of"     Social History     Tobacco Use   Smoking Status Current Every Day Smoker     Exercise History:   Family History:   No family history on file      Medications:  Current Facility-Administered Medications   Medication Dose Route Frequency    acetaminophen (TYLENOL) tablet 650 mg  650 mg Oral Q6H PRN    apixaban (ELIQUIS) tablet 5 mg  5 mg Oral BID    atorvastatin (LIPITOR) tablet 40 mg  40 mg Oral HS    bisacodyl (DULCOLAX) rectal suppository 10 mg  10 mg Rectal Daily    cholecalciferol (VITAMIN D3) tablet 2,000 Units  2,000 Units Oral Daily    dexamethasone (DECADRON) injection 6 mg  6 mg Intravenous Q24H    diltiazem (CARDIZEM CD) 24 hr capsule 120 mg  120 mg Oral Daily    famotidine (PEPCID) tablet 20 mg  20 mg Oral Daily    gabapentin (NEURONTIN) capsule 600 mg  600 mg Oral TID    insulin regular (HumuLIN R,NovoLIN R) 1 Units/mL in sodium chloride 0 9 % 100 mL infusion  0 3-21 Units/hr Intravenous Titrated    magnesium hydroxide (MILK OF MAGNESIA) oral suspension 30 mL  30 mL Oral Daily PRN    melatonin tablet 3 mg  3 mg Oral HS    metoprolol (LOPRESSOR) injection 2 5 mg  2 5 mg Intravenous Q6H PRN    metoprolol tartrate (LOPRESSOR) partial tablet 12 5 mg  12 5 mg Oral Q12H ALEJANDRINA    multivitamin-minerals (CENTRUM ADULTS) tablet 1 tablet  1 tablet Oral Daily    nicotine (NICODERM CQ) 14 mg/24hr TD 24 hr patch 1 patch  1 patch Transdermal Daily    ondansetron (ZOFRAN) injection 4 mg  4 mg Intravenous Q6H PRN    phenol (CHLORASEPTIC) 1 4 % mucosal liquid 1 spray  1 spray Mouth/Throat Q2H PRN    traZODone (DESYREL) tablet 25 mg  25 mg Oral HS     Home medications:  Prior to Admission Medications   Prescriptions Last Dose Informant Patient Reported? Taking? Heparin Sodium, Porcine, (heparin, porcine,) 5,000 units/mL   Yes No   Sig: Inject 5,000 Units under the skin every 8 (eight) hours   Polyethylene Glycol 3350 POWD   Yes No   Si g by Does not apply route daily as needed   acetaminophen (TYLENOL) 325 mg tablet   Yes No   Sig: Take 650 mg by mouth every 6 (six) hours as needed for mild pain   aspirin 325 mg tablet   Yes No   Sig: Take 325 mg by mouth daily   atorvastatin (LIPITOR) 20 mg tablet   Yes No   Sig: Take 20 mg by mouth daily   bisacodyl (Dulcolax) 10 mg suppository   Yes No   Sig: Insert 10 mg into the rectum daily   gabapentin (NEURONTIN) 600 MG tablet   Yes No   Sig: Take 600 mg by mouth 3 (three) times a day   magnesium hydroxide (MILK OF MAGNESIA) 400 mg/5 mL oral suspension   Yes No   Sig: Take 30 mL by mouth daily as needed for constipation   melatonin 3 mg   Yes No   Sig: Take 3 mg by mouth daily at bedtime   traZODone (DESYREL) 50 mg tablet   Yes No   Sig: Take 25 mg by mouth daily at bedtime      Facility-Administered Medications: None     Allergies:  No Known Allergies  ------------------------------------------------------------------------------------------------------------  Advance Directive and Living Will:      Power of :    POLST:    ------------------------------------------------------------------------------------------------------------  Care Time Delivered:   Upon my evaluation, this patient had a high probability of imminent or life-threatening deterioration due to COVID-19, acute hypoxic respiratory failure, which required my direct attention, intervention, and personal management    I have personally provided 20 minutes (1400 to 1600) of critical care time, exclusive of procedures, teaching, family meetings, and any prior time recorded by providers other than myself  Kaley Pineda PA-C        Portions of the record may have been created with voice recognition software  Occasional wrong word or "sound a like" substitutions may have occurred due to the inherent limitations of voice recognition software    Read the chart carefully and recognize, using context, where substitutions have occurred

## 2020-12-19 NOTE — ASSESSMENT & PLAN NOTE
· Multifactorial in setting of baseline cognitive dysfunction, prolonged hospitalization and hypoxemia  · CT head 12/18-negative for acute intracranial abnormality/hemorrhage  · Frequent orientation  Regulate sleep/wake cycles    · Daily CAM ICU

## 2020-12-19 NOTE — ASSESSMENT & PLAN NOTE
· Secondary to COVID-19 infection concern for superimposed bacterial infection in setting of prolonged steroids use  · Plan as noted above  · Will hold further steroid therapy  · May be candidate for Actemra  Check procalcitonin  Trend inflammatory markers  · Will hold antibiotics at this time  · Encourage good incentive spirometry/pulmonary toilet

## 2020-12-19 NOTE — ASSESSMENT & PLAN NOTE
· Appreciate cardiology consultation/recommendations  · Metoprolol 12 5 mg b i d  · Cardizem 24 hour capsule 120 mg daily  · Continue Eliquis 5 mg b i d  For systemic anticoagulation; chads Vasc 4   Given recent fall resulting in subdural he with notable with the to discuss risk v benefit of long-term anticoagulation

## 2020-12-19 NOTE — ASSESSMENT & PLAN NOTE
· Pt with recent hx of stroke x a few months ago followed by [facility] thought to be traumatic in setting of falls at home   · Repeat CT head obtained here that was unremarkable, however he has more aggressive today and not following commands quite as well; speech is at his baseline, left-sided weakness is at baseline  In the setting of starting anticoagulation will get stat CT head yesterday 12/18 with no acute intracranial finding or evidence of acute bleed  · Baseline left sided weakness, dysarthria/expressive aphasia  · Continue fall precautions  · Neurology evaluated,  · Discussed with neurosurgery regarding starting anticoagulation in setting of COVID status and new onset a   Fib with elevated chadsvasc score, pt cleared to start a/c, Eliquis 5 mg BID; discontinued aspirin  · Monitor   · PT/OT

## 2020-12-19 NOTE — RESPIRATORY THERAPY NOTE
Pt brought to ICU from [facility] and placed on HFNC     12/19/20 7521   Non-Invasive Information   Interface HFNC prongs   Non-Invasive Ventilation Mode HFNC (High flow)   SpO2 93 %   $ Pulse Oximetry Spot Check Charge Completed   Non-Invasive Settings   FiO2 (%) 100   Flow (lpm) 60   Temperature (Set) 31   Non-Invasive Readings   Heater Temperature (Obs) 31   Skin Intervention Skin intact

## 2020-12-19 NOTE — ASSESSMENT & PLAN NOTE
· Likely secondary to COVID-19 infection  · Continue to monitor clinically  · He was improved  · Patient had a change of mental status yesterday was taken for a CT the head that showed no acute changes finding suspect could in setting of his ongoing hypoxia with 15 L of mid flow use persist will obtain a palliative consult on Monday

## 2020-12-19 NOTE — ASSESSMENT & PLAN NOTE
· Pt presented with generalized weakness and increased confusion, he is stable but still requires 10-15 L via midflow  Initially requiring 5 L NC  · Pulmonary following, appreciate recommendations   · Continue COVID moderate pathway,   · Completed 5 days of IV Remdesivir on 12/14   · Continue IV Decadron 10 days through today 12/19   · s/p covalescent plasma 12/10  · Procalcitonin negative x2, IV antibiotics discontinued  · Vit C, Zinc, Vit D supplementation x 7 days now on daily MVI  · Lipitor 40 mg daily, Pepcid 20 mg daily  · Cleared by neurosurgery/neurolgoy for PO Eliquis 5 mg BID in setting of new onset afib  · D-dimer is < 2 5  · Inflammatory markers improving  · Consideration for Actemra if respiratory status worsens further as IL-6 is elevated 84 5  · Discussed with patient regarding code status when alert/oriented, CONFIRMED desire to be DNR/DNI status     · Depending on clinical course, if worsening hypoxia would consider palliative care consultation for goals of care with patient  · Will get an ABG now placed a call to ICU of the evaluate patient

## 2020-12-20 LAB
ALBUMIN SERPL BCP-MCNC: 2.3 G/DL (ref 3.5–5)
ALP SERPL-CCNC: 104 U/L (ref 46–116)
ALT SERPL W P-5'-P-CCNC: 66 U/L (ref 12–78)
ANION GAP SERPL CALCULATED.3IONS-SCNC: 8 MMOL/L (ref 4–13)
ANISOCYTOSIS BLD QL SMEAR: PRESENT
ARTERIAL PATENCY WRIST A: YES
AST SERPL W P-5'-P-CCNC: 35 U/L (ref 5–45)
BASE EXCESS BLDA CALC-SCNC: -1.6 MMOL/L
BASOPHILS # BLD MANUAL: 0 THOUSAND/UL (ref 0–0.1)
BASOPHILS NFR MAR MANUAL: 0 % (ref 0–1)
BILIRUB SERPL-MCNC: 0.6 MG/DL (ref 0.2–1)
BUN SERPL-MCNC: 58 MG/DL (ref 5–25)
CALCIUM ALBUM COR SERPL-MCNC: 10.2 MG/DL (ref 8.3–10.1)
CALCIUM SERPL-MCNC: 8.8 MG/DL (ref 8.3–10.1)
CHLORIDE SERPL-SCNC: 105 MMOL/L (ref 100–108)
CO2 SERPL-SCNC: 24 MMOL/L (ref 21–32)
CREAT SERPL-MCNC: 1.11 MG/DL (ref 0.6–1.3)
CRP SERPL QL: 10.1 MG/L
D DIMER PPP FEU-MCNC: 3.1 UG/ML FEU
EOSINOPHIL # BLD MANUAL: 0 THOUSAND/UL (ref 0–0.4)
EOSINOPHIL NFR BLD MANUAL: 0 % (ref 0–6)
ERYTHROCYTE [DISTWIDTH] IN BLOOD BY AUTOMATED COUNT: 17 % (ref 11.6–15.1)
FERRITIN SERPL-MCNC: 761 NG/ML (ref 8–388)
GFR SERPL CREATININE-BSD FRML MDRD: 66 ML/MIN/1.73SQ M
GLUCOSE SERPL-MCNC: 158 MG/DL (ref 65–140)
GLUCOSE SERPL-MCNC: 164 MG/DL (ref 65–140)
GLUCOSE SERPL-MCNC: 172 MG/DL (ref 65–140)
GLUCOSE SERPL-MCNC: 177 MG/DL (ref 65–140)
GLUCOSE SERPL-MCNC: 178 MG/DL (ref 65–140)
GLUCOSE SERPL-MCNC: 190 MG/DL (ref 65–140)
GLUCOSE SERPL-MCNC: 200 MG/DL (ref 65–140)
GLUCOSE SERPL-MCNC: 207 MG/DL (ref 65–140)
GLUCOSE SERPL-MCNC: 219 MG/DL (ref 65–140)
GLUCOSE SERPL-MCNC: 225 MG/DL (ref 65–140)
GLUCOSE SERPL-MCNC: 284 MG/DL (ref 65–140)
GLUCOSE SERPL-MCNC: 295 MG/DL (ref 65–140)
GLUCOSE SERPL-MCNC: 321 MG/DL (ref 65–140)
GLUCOSE SERPL-MCNC: 328 MG/DL (ref 65–140)
GLUCOSE SERPL-MCNC: 364 MG/DL (ref 65–140)
HCO3 BLDA-SCNC: 22.2 MMOL/L (ref 22–28)
HCT VFR BLD AUTO: 36.1 % (ref 36.5–49.3)
HFNC FLOW LPM: 60
HGB BLD-MCNC: 12.3 G/DL (ref 12–17)
LYMPHOCYTES # BLD AUTO: 0.37 THOUSAND/UL (ref 0.6–4.47)
LYMPHOCYTES # BLD AUTO: 3 % (ref 14–44)
MAGNESIUM SERPL-MCNC: 2.4 MG/DL (ref 1.6–2.6)
MCH RBC QN AUTO: 31 PG (ref 26.8–34.3)
MCHC RBC AUTO-ENTMCNC: 34.1 G/DL (ref 31.4–37.4)
MCV RBC AUTO: 91 FL (ref 82–98)
MONOCYTES # BLD AUTO: 0.37 THOUSAND/UL (ref 0–1.22)
MONOCYTES NFR BLD: 3 % (ref 4–12)
MYELOCYTES NFR BLD MANUAL: 3 % (ref 0–1)
NEUTROPHILS # BLD MANUAL: 11.01 THOUSAND/UL (ref 1.85–7.62)
NEUTS BAND NFR BLD MANUAL: 1 % (ref 0–8)
NEUTS SEG NFR BLD AUTO: 89 % (ref 43–75)
NON VENT HFNC FIO2: 80
NON VENT TYPE HFNC: ABNORMAL
NRBC BLD AUTO-RTO: 0 /100 WBCS
O2 CT BLDA-SCNC: 19.4 ML/DL (ref 16–23)
OXYHGB MFR BLDA: 95.6 % (ref 94–97)
PCO2 BLDA: 34.9 MM HG (ref 36–44)
PH BLDA: 7.42 [PH] (ref 7.35–7.45)
PHOSPHATE SERPL-MCNC: 5.2 MG/DL (ref 2.3–4.1)
PLATELET # BLD AUTO: 210 THOUSANDS/UL (ref 149–390)
PLATELET BLD QL SMEAR: ADEQUATE
PMV BLD AUTO: 10.5 FL (ref 8.9–12.7)
PO2 BLDA: 84.7 MM HG (ref 75–129)
POTASSIUM SERPL-SCNC: 5.5 MMOL/L (ref 3.5–5.3)
PROCALCITONIN SERPL-MCNC: <0.05 NG/ML
PROCALCITONIN SERPL-MCNC: <0.05 NG/ML
PROT SERPL-MCNC: 6.3 G/DL (ref 6.4–8.2)
RBC # BLD AUTO: 3.97 MILLION/UL (ref 3.88–5.62)
SODIUM SERPL-SCNC: 137 MMOL/L (ref 136–145)
SPECIMEN SOURCE: ABNORMAL
TOTAL CELLS COUNTED SPEC: 100
VARIANT LYMPHS # BLD AUTO: 1 %
WBC # BLD AUTO: 12.23 THOUSAND/UL (ref 4.31–10.16)

## 2020-12-20 PROCEDURE — 80053 COMPREHEN METABOLIC PANEL: CPT | Performed by: NURSE PRACTITIONER

## 2020-12-20 PROCEDURE — 85027 COMPLETE CBC AUTOMATED: CPT | Performed by: NURSE PRACTITIONER

## 2020-12-20 PROCEDURE — 94760 N-INVAS EAR/PLS OXIMETRY 1: CPT

## 2020-12-20 PROCEDURE — 82948 REAGENT STRIP/BLOOD GLUCOSE: CPT

## 2020-12-20 PROCEDURE — 82728 ASSAY OF FERRITIN: CPT | Performed by: NURSE PRACTITIONER

## 2020-12-20 PROCEDURE — 99233 SBSQ HOSP IP/OBS HIGH 50: CPT | Performed by: ANESTHESIOLOGY

## 2020-12-20 PROCEDURE — 86140 C-REACTIVE PROTEIN: CPT | Performed by: NURSE PRACTITIONER

## 2020-12-20 PROCEDURE — 84100 ASSAY OF PHOSPHORUS: CPT | Performed by: NURSE PRACTITIONER

## 2020-12-20 PROCEDURE — 82805 BLOOD GASES W/O2 SATURATION: CPT | Performed by: NURSE PRACTITIONER

## 2020-12-20 PROCEDURE — 84145 PROCALCITONIN (PCT): CPT | Performed by: PHYSICIAN ASSISTANT

## 2020-12-20 PROCEDURE — 85379 FIBRIN DEGRADATION QUANT: CPT | Performed by: NURSE PRACTITIONER

## 2020-12-20 PROCEDURE — 83735 ASSAY OF MAGNESIUM: CPT | Performed by: NURSE PRACTITIONER

## 2020-12-20 PROCEDURE — 85007 BL SMEAR W/DIFF WBC COUNT: CPT | Performed by: NURSE PRACTITIONER

## 2020-12-20 PROCEDURE — 36600 WITHDRAWAL OF ARTERIAL BLOOD: CPT

## 2020-12-20 PROCEDURE — 94660 CPAP INITIATION&MGMT: CPT

## 2020-12-20 RX ADMIN — GABAPENTIN 600 MG: 300 CAPSULE ORAL at 16:09

## 2020-12-20 RX ADMIN — Medication 1 PATCH: at 08:52

## 2020-12-20 RX ADMIN — SODIUM CHLORIDE 8 UNITS/HR: 9 INJECTION, SOLUTION INTRAVENOUS at 14:04

## 2020-12-20 RX ADMIN — MELATONIN 3 MG: at 22:15

## 2020-12-20 RX ADMIN — Medication 12.5 MG: at 22:15

## 2020-12-20 RX ADMIN — GABAPENTIN 600 MG: 300 CAPSULE ORAL at 08:50

## 2020-12-20 RX ADMIN — SODIUM CHLORIDE 8 UNITS/HR: 9 INJECTION, SOLUTION INTRAVENOUS at 20:25

## 2020-12-20 RX ADMIN — APIXABAN 5 MG: 5 TABLET, FILM COATED ORAL at 17:01

## 2020-12-20 RX ADMIN — Medication 12.5 MG: at 08:51

## 2020-12-20 RX ADMIN — FAMOTIDINE 20 MG: 20 TABLET ORAL at 08:51

## 2020-12-20 RX ADMIN — SODIUM CHLORIDE 8 UNITS/HR: 9 INJECTION, SOLUTION INTRAVENOUS at 11:53

## 2020-12-20 RX ADMIN — DOCUSATE SODIUM AND SENNOSIDES 2 TABLET: 8.6; 5 TABLET ORAL at 08:51

## 2020-12-20 RX ADMIN — APIXABAN 5 MG: 5 TABLET, FILM COATED ORAL at 08:51

## 2020-12-20 RX ADMIN — DOCUSATE SODIUM AND SENNOSIDES 2 TABLET: 8.6; 5 TABLET ORAL at 17:01

## 2020-12-20 RX ADMIN — SODIUM CHLORIDE 4 UNITS/HR: 9 INJECTION, SOLUTION INTRAVENOUS at 22:16

## 2020-12-20 RX ADMIN — DILTIAZEM HYDROCHLORIDE 120 MG: 120 CAPSULE, COATED, EXTENDED RELEASE ORAL at 08:51

## 2020-12-20 RX ADMIN — TRAZODONE HYDROCHLORIDE 25 MG: 50 TABLET ORAL at 22:15

## 2020-12-20 RX ADMIN — Medication 2000 UNITS: at 08:51

## 2020-12-20 RX ADMIN — ATORVASTATIN CALCIUM 40 MG: 40 TABLET, FILM COATED ORAL at 22:15

## 2020-12-20 RX ADMIN — GABAPENTIN 600 MG: 300 CAPSULE ORAL at 22:15

## 2020-12-20 RX ADMIN — Medication 1 TABLET: at 08:52

## 2020-12-20 NOTE — RESPIRATORY THERAPY NOTE
12/19/20 2322   Non-Invasive Information   Interface HFNC prongs   Non-Invasive Ventilation Mode HFNC (High flow)   $ Pulse Oximetry Spot Check Charge Completed   Resp Comments no changes made at this time     Non-Invasive Settings   FiO2 (%) 100   Flow (lpm) 60   Temperature (Set) 31   Non-Invasive Readings   Heater Temperature (Obs) 30 8   Skin Intervention Skin intact

## 2020-12-20 NOTE — RESPIRATORY THERAPY NOTE
12/20/20 0346   Non-Invasive Information   Interface HFNC prongs   Non-Invasive Ventilation Mode HFNC (High flow)   $ Pulse Oximetry Spot Check Charge Completed   Resp Comments pt remains on HFNC at this time, pts FiO2 titrated at this time   Continue to titrate settings as tolerated   Non-Invasive Settings   FiO2 (%) 80   Flow (lpm) 60   Temperature (Set) 31   Non-Invasive Readings   Heater Temperature (Obs) 31   Skin Intervention Skin intact

## 2020-12-20 NOTE — PROGRESS NOTES
Progress Note Jw Nielson 1950, 65 y o  male MRN: [id number]    Unit/Bed#:  Encounter: [id number]    Primary Care Provider: Chuck Martinez MD   Date and time admitted to hospital: 12/10/2020 12:59 PM        * COVID-19 virus infection  Assessment & Plan  Completed treatment under moderate pathway; now requiring   - Oxygen:Continue   - Vitamin C/Zinc/Statin: x 7 days  Complete  - Steroids Completed 10 day of dexamethasone 6 mg daily  - Convalescent Plasma: 12/11  - Remdesivir: x 4 complete  12/10-12/14  - Actemra: Will check inflammatory markers, procalcitonin  May be candidate for Actemra  - Anticoagulation:  Eliquis 5 mg b i d   - Encourage self proning/aggressive pulmonary toilet      Acute respiratory failure with hypoxia (HCC)  Assessment & Plan  · Secondary to COVID-19 infection concern for superimposed bacterial infection in setting of prolonged steroids use  · Plan as noted above  · Will hold further steroid therapy  · May be candidate for Actemra  Check procalcitonin  Trend inflammatory markers  · Will hold antibiotics at this time  · Encourage good incentive spirometry/pulmonary toilet  Acute metabolic encephalopathy  Assessment & Plan  · Multifactorial in setting of baseline cognitive dysfunction, prolonged hospitalization and hypoxemia  · CT head 12/18-negative for acute intracranial abnormality/hemorrhage  · Frequent orientation  Regulate sleep/wake cycles  · Daily CAM ICU    New onset a-fib [facility])  Assessment & Plan  · Appreciate cardiology consultation/recommendations  · Metoprolol 12 5 mg b i d  · Cardizem 24 hour capsule 120 mg daily  · Continue Eliquis 5 mg b i d  For systemic anticoagulation; chads Vasc 4   Given recent fall resulting in subdural would suggest to discuss risk v benefit of long-term anticoagulation    Positive blood culture  Assessment & Plan  · Suspect contaminant in setting of [1/2 blood culture] Staph aureus coagulase negative     Right Subdural hematoma ([facility] [address] ) 2020  Assessment & Plan  · Suspected traumatic subdural hematoma 2020 in setting of traumatic fall  · Left upper extremity residual weakness  · Given requirements for systemic anticoagulation case reviewed with Neurosurgery clearing patient for anticoagulant    CVA (cerebral vascular accident) [facility])  Assessment & Plan  · Hemorrhagic CVA   · Left upper extremity residual deficits  Essential hypertension  Assessment & Plan  · Continue metoprolol 12 5 mg b i d   · Diltiazem 24 hr capsule 120 mg daily    COPD (chronic obstructive pulmonary disease) ([facility] [address] )  Assessment & Plan  · Not in acute exacerbation  · Bronchodilators as needed    Other hyperlipidemia  Assessment & Plan  · Continue Lipitor 40 mg daily  Hyperglycemia  Assessment & Plan  · Insulin gtt  · Continue monitor glucoses      ----------------------------------------------------------------------------------------  HPI/24hr events:  Transferred to the step-down unit yesterday for increasing oxygen requirements  Remains dependent on 60 L at 100%  Encephalopathic, likely secondary to hypoxia due to COVID-19      Disposition: Continue Stepdown Level 1 level of care   Code Status: Level 3 - DNAR and DNI  ---------------------------------------------------------------------------------------  SUBJECTIVE  No complaints offered    Review of Systems  Review of systems was reviewed and negative unless stated above in HPI/24-hour events   ---------------------------------------------------------------------------------------  OBJECTIVE    Vitals   Vitals:    20 0000 20 0100 20 0200 20 0300   BP: 126/68 114/67 109/64 117/59   BP Location:    Right arm   Pulse: 69 71 72 69   Resp: 15 15 16 19   Temp:    (!) 96 5 °F (35 8 °C)   TempSrc:    Axillary   SpO2: 97% 98% 95% 100%   Weight:       Height:         Temp (24hrs), Av °F (36 1 °C), Min:96 5 °F (35 8 °C), Max:97 6 °F (36 4 °C)  Current: Temperature: (!) 96 5 °F (35 8 °C)          Respiratory:  SpO2: SpO2: 100 %  Nasal Cannula O2 Flow Rate (L/min): 15 L/min    Invasive/non-invasive ventilation settings   Respiratory    Lab Data (Last 4 hours)    None         O2/Vent Data (Last 4 hours)      12/20 0341 12/20 0346        Non-Invasive Ventilation Mode HFNC (High flow) HFNC (High flow)                  Physical Exam  GEN:  Ill appearing, appears stated age, no acute distress  HEENT:  Sclera anicteric, mucous membranes pink and moist, conjunctiva pink, no robb/rhinorrhea  Neck:  No lymphadenopathy, normal ROM, supple, no bruits  CV :  S1S2, regular, no murmurs, rubs or gallops  Intact distal pulses  No JVD  Resp:  Lungs with scattered rhonchi diminished in the bases  No subq air or crepitus  Symmetrical expansion  No cough noted    GI :  Abd soft, nontender, no guarding/rebound, nondistended, normoactive bowel sounds X4 quads, no organomegaly appreciated  Neuro:  Occasional agitation, GCS 14-confused, oriented only to name      Laboratory and Diagnostics:  Results from last 7 days   Lab Units 12/19/20  0452 12/16/20  0543 12/15/20  0440 12/14/20  0546 12/13/20  0527   WBC Thousand/uL 9 93 5 35 6 60 7 56 6 64   HEMOGLOBIN g/dL 12 3 11 0* 11 3* 10 9* 10 7*   HEMATOCRIT % 36 5 33 1* 33 2* 32 4* 31 9*   PLATELETS Thousands/uL 213 201 197 193 154   NEUTROS PCT %  --   --   --  87* 87*   MONOS PCT %  --   --   --  5 5     Results from last 7 days   Lab Units 12/18/20  0838 12/16/20  0543 12/15/20  0440 12/13/20  0527   SODIUM mmol/L 138 134* 137 144   POTASSIUM mmol/L 5 1 4 7 4 7 4 5   CHLORIDE mmol/L 104 101 104 109*   CO2 mmol/L 28 22 22 22   ANION GAP mmol/L 6 11 11 13   BUN mg/dL 38* 37* 29* 30*   CREATININE mg/dL 1 11 1 04 1 02 0 96   CALCIUM mg/dL 8 8 8 4 8 7 8 8   GLUCOSE RANDOM mg/dL 271* 389* 262* 192*   ALT U/L  --  41 45 35   AST U/L  --  24 32 49*   ALK PHOS U/L  --  119* 121* 99   ALBUMIN g/dL  --  2 2* 2 3* 2 4*   TOTAL BILIRUBIN mg/dL  --  0 30 0 40 0 40 Results from last 7 days   Lab Units 12/18/20  0838   MAGNESIUM mg/dL 2 2                   ABG:  Results from last 7 days   Lab Units 12/19/20  1154   PH ART  7 474*   PCO2 ART mm Hg 31 5*   PO2 ART mm Hg 46 2*   HCO3 ART mmol/L 22 6   BASE EXC ART mmol/L -0 2   ABG SOURCE  Radial, Right     VBG:  Results from last 7 days   Lab Units 12/19/20  1154   ABG SOURCE  Radial, Right     Results from last 7 days   Lab Units 12/19/20  1349 12/15/20  0440 12/14/20  0546   PROCALCITONIN ng/ml <0 05 <0 05 <0 05       Micro        EKG:  Sinus rhythm  Imaging: I have personally reviewed pertinent reports  and I have personally reviewed pertinent films in PACS    Intake and Output  I/O       12/18 0701 - 12/19 0700 12/19 0701 - 12/20 0700    P  O  480 720    I V  (mL/kg)  13 3 (0 3)    Total Intake(mL/kg) 480 (9 4) 733 3 (14 4)    Urine (mL/kg/hr) 1000 (0 8) 2250 (1 8)    Total Output 1000 2250    Net -520 -1453 7                Height and Weights   Height: 5' 4" (162 6 cm)  IBW: 59 2 kg  Body mass index is 19 3 kg/m²  Weight (last 2 days)     Date/Time   Weight    12/19/20 0300   51 (112 43)    12/18/20 0600   57 9 (127 65)                Nutrition       Diet Orders   (From admission, onward)             Start     Ordered    12/19/20 1217  Diet NPO  Diet effective now     Question Answer Comment   Diet Type NPO    Special Instructions Disposable Meal    RD to adjust diet per protocol?  Yes        12/19/20 1217    12/18/20 1455  Dietary nutrition supplements  Once     Question Answer Comment   Select Supplement: Glucerna-Vanilla    Frequency Breakfast, Dinner        12/18/20 1458                  Active Medications  Scheduled Meds:  Current Facility-Administered Medications   Medication Dose Route Frequency Provider Last Rate    acetaminophen  650 mg Oral Q6H PRN Astrid Sellar, PA-C      apixaban  5 mg Oral BID Astrid Sellar, PA-C      atorvastatin  40 mg Oral HS Astrid Sellar, PA-C      bisacodyl  10 mg Rectal Daily Ashley Chi Cristy Jennings PA-C      cholecalciferol  2,000 Units Oral Daily [facility] [place], [place]      diltiazem  120 mg Oral Daily [facility] [place]      famotidine  20 mg Oral Daily [facility] [place]      gabapentin  600 mg Oral TID Toyas IVET Valenzuela      insulin regular (HumuLIN R,NovoLIN R) infusion  0 3-21 Units/hr Intravenous Titrated Toyas AMBERLY ValenzuelaC 1 Units/hr (12/20/20 0415)    magnesium hydroxide  30 mL Oral Daily PRN Corlis IVET Valenzuela      melatonin  3 mg Oral HS Corkylees IVET Valenzuela      metoprolol  2 5 mg Intravenous Q6H PRN Corlis MeIVET humphrey      metoprolol tartrate  12 5 mg Oral Q12H [address] Toyas IVET Valenzuela      multivitamin-minerals  1 tablet Oral Daily [facility] [place], [place]      nicotine  1 patch Transdermal Daily [facility] [place], [place]      ondansetron  4 mg Intravenous Q6H PRN Toyas MeIVET humphrey      phenol  1 spray Mouth/Throat Q2H PRN Corlis IVET Valenzuela      senna-docusate sodium  2 tablet Oral BID ZULMA Her      traZODone  25 mg Oral HS Corkylees IVET Valenzuela       Continuous Infusions:  insulin regular (HumuLIN R,NovoLIN R) infusion, 0 3-21 Units/hr, Last Rate: 1 Units/hr (12/20/20 0415)      PRN Meds:   acetaminophen, 650 mg, Q6H PRN  magnesium hydroxide, 30 mL, Daily PRN  metoprolol, 2 5 mg, Q6H PRN  ondansetron, 4 mg, Q6H PRN  phenol, 1 spray, Q2H PRN        Invasive Devices Review  Invasive Devices     Peripheral Intravenous Line            Peripheral IV 12/17/20 Right;Ventral (anterior) Forearm 2 days          Drain            External Urinary Catheter Medium 8 days                Rationale for remaining devices:  Not applicable  ---------------------------------------------------------------------------------------  Advance Directive and Living Will:      Power of Attorney:    POLST:    ---------------------------------------------------------------------------------------  Care Time Delivered:   No Critical Care time spent       ZULMA Her      Portions of the record may have been created with voice recognition software  Occasional wrong word or "sound a like" substitutions may have occurred due to the inherent limitations of voice recognition software    Read the chart carefully and recognize, using context, where substitutions have occurred

## 2020-12-20 NOTE — RESPIRATORY THERAPY NOTE
12/20/20 9330   Non-Invasive Information   Interface HFNC prongs   Non-Invasive Ventilation Mode HFNC (High flow)   SpO2 91 %   $ Pulse Oximetry Spot Check Charge Completed   Non-Invasive Settings   FiO2 (%) 70   Flow (lpm) 55   Temperature (Set) 31   Non-Invasive Readings   Heater Temperature (Obs) 31   Skin Intervention Skin intact   Maintenance   Water bag changed Yes

## 2020-12-20 NOTE — RESPIRATORY THERAPY NOTE
12/20/20 1130   Non-Invasive Information   Interface HFNC prongs   Non-Invasive Ventilation Mode HFNC (High flow)   SpO2 97 %   $ Pulse Oximetry Spot Check Charge Completed   Non-Invasive Settings   FiO2 (%) 70   Flow (lpm) 55   Temperature (Set) 31   Non-Invasive Readings   Heater Temperature (Obs) 31   Skin Intervention Skin intact

## 2020-12-20 NOTE — ASSESSMENT & PLAN NOTE
· Suspected traumatic subdural hematoma 9/2020 in setting of traumatic fall  · Left upper extremity residual weakness    · Given requirements for systemic anticoagulation case reviewed with Neurosurgery clearing patient for anticoagulant

## 2020-12-20 NOTE — UTILIZATION REVIEW
Continued Stay Review    Date:12/20                          Current Patient Class: IP    Current Level of Care: ICU     HPI:70 y o  male initially admitted on 12/10 for COVID     Assessment/Plan:  Transferred to the step-down unit yesterday for increasing oxygen requirements  Remains dependent on 60 L at 100%  Encephalopathic, likely secondary to hypoxia due to COVID-19  Oriented to person only   Cont to monitor , self proning   Check inflammatory markers may be a candidate for Actemra  Pertinent Labs/Diagnostic Results:   12/19 PCXR No change in right greater than left ground glass opacity due to Covid 19 pneumonia    Results from last 7 days   Lab Units 12/20/20  0507 12/19/20  0452 12/16/20  0543 12/15/20  0440 12/14/20  0546   WBC Thousand/uL 12 23* 9 93 5 35 6 60 7 56   HEMOGLOBIN g/dL 12 3 12 3 11 0* 11 3* 10 9*   HEMATOCRIT % 36 1* 36 5 33 1* 33 2* 32 4*   PLATELETS Thousands/uL 210 213 201 197 193   NEUTROS ABS Thousands/µL  --   --   --   --  6 62   BANDS PCT % 1  --   --   --   --      Results from last 7 days   Lab Units 12/20/20  0507 12/18/20  0838 12/16/20  0543 12/15/20  0440   SODIUM mmol/L 137 138 134* 137   POTASSIUM mmol/L 5 5* 5 1 4 7 4 7   CHLORIDE mmol/L 105 104 101 104   CO2 mmol/L 24 28 22 22   ANION GAP mmol/L 8 6 11 11   BUN mg/dL 58* 38* 37* 29*   CREATININE mg/dL 1 11 1 11 1 04 1 02   EGFR ml/min/1 73sq m 66 66 71 73   CALCIUM mg/dL 8 8 8 8 8 4 8 7   MAGNESIUM mg/dL 2 4 2 2  --   --    PHOSPHORUS mg/dL 5 2*  --   --   --      Results from last 7 days   Lab Units 12/20/20  0507 12/16/20  0543 12/15/20  0440   AST U/L 35 24 32   ALT U/L 66 41 45   ALK PHOS U/L 104 119* 121*   TOTAL PROTEIN g/dL 6 3* 6 3* 6 6   ALBUMIN g/dL 2 3* 2 2* 2 3*   TOTAL BILIRUBIN mg/dL 0 60 0 30 0 40     Results from last 7 days   Lab Units 12/20/20  1152 12/20/20  1039 12/20/20  1000 12/20/20  0755 12/20/20  8393 12/20/20  0412 12/20/20  0155 12/20/20  0009 12/19/20  2213 12/19/20  2115 12/19/20  1906 12/19/20  1713   POC GLUCOSE mg/dl 364* 295* 164* 190* 284* 158* 178* 200* 227* 68 85 209*     Results from last 7 days   Lab Units 12/20/20  0507 12/18/20  0838 12/16/20  0543 12/15/20  0440   GLUCOSE RANDOM mg/dL 177* 271* 389* 262*     Results from last 7 days   Lab Units 12/14/20  1241   HEMOGLOBIN A1C % 6 5*   EAG mg/dl 140     No results found for: BETA-HYDROXYBUTYRATE   Results from last 7 days   Lab Units 12/20/20  0630 12/19/20  1154   PH ART  7 422 7 474*   PCO2 ART mm Hg 34 9* 31 5*   PO2 ART mm Hg 84 7 46 2*   HCO3 ART mmol/L 22 2 22 6   BASE EXC ART mmol/L -1 6 -0 2   O2 CONTENT ART mL/dL 19 4 14 9*   O2 HGB, ARTERIAL % 95 6 82 3*   ABG SOURCE  Radial, Right Radial, Right   NON VENT TYPE HFNC  HFNC Flow  --    HFNC FLOW LPM  60  --      Results from last 7 days   Lab Units 12/20/20  0507 12/19/20  1349 12/16/20  0543 12/15/20  0440 12/14/20  0546   D-DIMER QUANTITATIVE ug/ml FEU 3 10* 3 63* 1 38* 2 37* 1 93*     Results from last 7 days   Lab Units 12/14/20  1240   TSH 3RD GENERATON uIU/mL 0 343*     Results from last 7 days   Lab Units 12/19/20  1349 12/15/20  0440 12/14/20  0546   PROCALCITONIN ng/ml <0 05 <0 05 <0 05     Results from last 7 days   Lab Units 12/19/20  1349 12/15/20  0440 12/14/20  0546   FERRITIN ng/mL 877* 1,305* 1,715*     Results from last 7 days   Lab Units 12/20/20  0507 12/19/20  1349 12/15/20  0440 12/14/20  0546   CRP mg/L 10 1* 11 0* 35 6* 42 8*     Results from last 7 days   Lab Units 12/20/20  0507   TOTAL COUNTED  100       Vital Signs:   12/20/20 1200  98 2 °F (36 8 °C)  66  18  115/57  79  94 %  --  --  --  --  --  --   12/20/20 1130  --  --  --  --  --  97 %  70  --  55 L/min  --  High flow nasal cannula  --   12/20/20 1100  --  63  17  124/57  82  95 %  --  --  --  --  --  --   12/20/20 1000  --  65  24Abnormal   123/63  87  92 %  --  --  --  --  --  --   12/20/20 0930  97 8 °F (36 6 °C)  71  20  106/64  81  95 %  --  --  --  --  High flow nasal cannula  Lying 12/20/20 0800  --  68  17  119/68  87  93 %  --  --  --  --  --  --   12/20/20 0749  --  --  --  --  --  93 %  --  --  --  --  --  --   12/20/20 0300  96 5 °F (35 8 °C)Abnormal   69  19  117/59  80  100 %  --  --  --  --  High flow nasal cannula  Lying   12/20/20 0200  --  72  16  109/64  80  95 %  --  --  --  --  --  --   12/20/20 0100  --  71  15  114/67  84  98 %  --  --  --  --  --  --   12/20/20 0000  --  69  15  126/68  89  97 %                     Medications:   Scheduled Medications:  apixaban, 5 mg, Oral, BID  atorvastatin, 40 mg, Oral, HS  bisacodyl, 10 mg, Rectal, Daily  cholecalciferol, 2,000 Units, Oral, Daily  diltiazem, 120 mg, Oral, Daily  famotidine, 20 mg, Oral, Daily  gabapentin, 600 mg, Oral, TID  melatonin, 3 mg, Oral, HS  metoprolol tartrate, 12 5 mg, Oral, Q12H [facility]  multivitamin-minerals, 1 tablet, Oral, Daily  nicotine, 1 patch, Transdermal, Daily  senna-docusate sodium, 2 tablet, Oral, BID  traZODone, 25 mg, Oral, HS      Continuous IV Infusions:  insulin regular (HumuLIN R,NovoLIN R) infusion, 0 3-21 Units/hr, Intravenous, Titrated      PRN Meds:  acetaminophen, 650 mg, Oral, Q6H PRN  magnesium hydroxide, 30 mL, Oral, Daily PRN  metoprolol, 2 5 mg, Intravenous, Q6H PRN  ondansetron, 4 mg, Intravenous, Q6H PRN  phenol, 1 spray, Mouth/Throat, Q2H PRN        Discharge Plan: D     Network Utilization Review Department  ATTENTION: Please call with any questions or concerns to [phone] and carefully listen to the prompts so that you are directed to the right person  All voicemails are confidential   Gaurav Nichols all requests for admission clinical reviews, approved or denied determinations and any other requests to dedicated fax number below belonging to the campus where the patient is receiving treatment   List of dedicated fax numbers for the Facilities:  [address] DENIALS (Administrative/Medical Necessity) [phone]   [address] (Maternity/NICU/Pediatrics) 261 [facility],[address] [address] [phone]   [address] (  Anam Orona "Chen" 103) [address] [place] [address] [address] [phone]   [place] [phone]

## 2020-12-20 NOTE — RESPIRATORY THERAPY NOTE
12/19/20 2100   Non-Invasive Information   Interface HFNC prongs   Non-Invasive Ventilation Mode HFNC (High flow)   $ Pulse Oximetry Spot Check Charge Completed   Resp Comments pt remains on HFNC, no changes made at this time   Non-Invasive Settings   FiO2 (%) 100   Flow (lpm) 60   Temperature (Set) 31   Non-Invasive Readings   Heater Temperature (Obs) 31   Skin Intervention Skin intact

## 2020-12-20 NOTE — PLAN OF CARE
Problem: Potential for Falls  Goal: Patient will remain free of falls  Description: INTERVENTIONS:  - Assess patient frequently for physical needs  -  Identify cognitive and physical deficits and behaviors that affect risk of falls    -  [place] fall precautions as indicated by assessment   - Educate patient/family on patient safety including physical limitations  - Instruct patient to call for assistance with activity based on assessment  - Modify environment to reduce risk of injury  - Consider OT/PT consult to assist with strengthening/mobility  Outcome: Progressing     Problem: PAIN - ADULT  Goal: Verbalizes/displays adequate comfort level or baseline comfort level  Description: Interventions:  - Encourage patient to monitor pain and request assistance  - Assess pain using appropriate pain scale  - Administer analgesics based on type and severity of pain and evaluate response  - Implement non-pharmacological measures as appropriate and evaluate response  - Consider cultural and social influences on pain and pain management  - Notify physician/advanced practitioner if interventions unsuccessful or patient reports new pain  Outcome: Progressing     Problem: INFECTION - ADULT  Goal: Absence or prevention of progression during hospitalization  Description: INTERVENTIONS:  - Assess and monitor for signs and symptoms of infection  - Monitor lab/diagnostic results  - Monitor all insertion sites, i e  indwelling lines, tubes, and drains  - Monitor endotracheal if appropriate and nasal secretions for changes in amount and color  - [place] appropriate cooling/warming therapies per order  - Administer medications as ordered  - Instruct and encourage patient and family to use good hand hygiene technique  - Identify and instruct in appropriate isolation precautions for identified infection/condition  Outcome: Progressing  Goal: Absence of fever/infection during neutropenic period  Description: INTERVENTIONS:  - Monitor WBC    Outcome: Progressing     Problem: SAFETY ADULT  Goal: Patient will remain free of falls  Description: INTERVENTIONS:  - Assess patient frequently for physical needs  -  Identify cognitive and physical deficits and behaviors that affect risk of falls    -  [place] fall precautions as indicated by assessment   - Educate patient/family on patient safety including physical limitations  - Instruct patient to call for assistance with activity based on assessment  - Modify environment to reduce risk of injury  - Consider OT/PT consult to assist with strengthening/mobility  Outcome: Progressing  Goal: Maintain or return to baseline ADL function  Description: INTERVENTIONS:  -  Assess patient's ability to carry out ADLs; assess patient's baseline for ADL function and identify physical deficits which impact ability to perform ADLs (bathing, care of mouth/teeth, toileting, grooming, dressing, etc )  - Assess/evaluate cause of self-care deficits   - Assess range of motion  - Assess patient's mobility; develop plan if impaired  - Assess patient's need for assistive devices and provide as appropriate  - Encourage maximum independence but intervene and supervise when necessary  - Involve family in performance of ADLs  - Assess for home care needs following discharge   - Consider OT consult to assist with ADL evaluation and planning for discharge  - Provide patient education as appropriate  Outcome: Progressing  Goal: Maintain or return mobility status to optimal level  Description: INTERVENTIONS:  - Assess patient's baseline mobility status (ambulation, transfers, stairs, etc )    - Identify cognitive and physical deficits and behaviors that affect mobility  - Identify mobility aids required to assist with transfers and/or ambulation (gait belt, sit-to-stand, lift, walker, cane, etc )  - [place] fall precautions as indicated by assessment  - Record patient progress and toleration of activity level on Mobility SBAR; progress patient to next Phase/Stage  - Instruct patient to call for assistance with activity based on assessment  - Consider rehabilitation consult to assist with strengthening/weightbearing, etc   Outcome: Progressing     Problem: DISCHARGE PLANNING  Goal: Discharge to home or other facility with appropriate resources  Description: INTERVENTIONS:  - Identify barriers to discharge w/patient and caregiver  - Arrange for needed discharge resources and transportation as appropriate  - Identify discharge learning needs (meds, wound care, etc )  - Arrange for interpretive services to assist at discharge as needed  - Refer to Case Management Department for coordinating discharge planning if the patient needs post-hospital services based on physician/advanced practitioner order or complex needs related to functional status, cognitive ability, or social support system  Outcome: Progressing     Problem: Knowledge Deficit  Goal: Patient/family/caregiver demonstrates understanding of disease process, treatment plan, medications, and discharge instructions  Description: Complete learning assessment and assess knowledge base    Interventions:  - Provide teaching at level of understanding  - Provide teaching via preferred learning methods  Outcome: Progressing     Problem: Prexisting or High Potential for Compromised Skin Integrity  Goal: Skin integrity is maintained or improved  Description: INTERVENTIONS:  - Identify patients at risk for skin breakdown  - Assess and monitor skin integrity  - Assess and monitor nutrition and hydration status  - Monitor labs   - Assess for incontinence   - Turn and reposition patient  - Assist with mobility/ambulation  - Relieve pressure over bony prominences  - Avoid friction and shearing  - Provide appropriate hygiene as needed including keeping skin clean and dry  - Evaluate need for skin moisturizer/barrier cream  - Collaborate with interdisciplinary team   - Patient/family teaching  - Consider wound care consult   Outcome: Progressing     Problem: Nutrition/Hydration-ADULT  Goal: Nutrient/Hydration intake appropriate for improving, restoring or maintaining nutritional needs  Description: Monitor and assess patient's nutrition/hydration status for malnutrition  Collaborate with interdisciplinary team and initiate plan and interventions as ordered  Monitor patient's weight and dietary intake as ordered or per policy  Utilize nutrition screening tool and intervene as necessary  Determine patient's food preferences and provide high-protein, high-caloric foods as appropriate       INTERVENTIONS:  - Monitor oral intake, urinary output, labs, and treatment plans  - Assess nutrition and hydration status and recommend course of action  - Evaluate amount of meals eaten  - Assist patient with eating if necessary   - Allow adequate time for meals  - Recommend/ encourage appropriate diets, oral nutritional supplements, and vitamin/mineral supplements  - Order, calculate, and assess calorie counts as needed  - Recommend, monitor, and adjust tube feedings and TPN/PPN based on assessed needs  - Assess need for intravenous fluids  - Provide specific nutrition/hydration education as appropriate  - Include patient/family/caregiver in decisions related to nutrition  Outcome: Progressing     Problem: RESPIRATORY - ADULT  Goal: Achieves optimal ventilation and oxygenation  Description: INTERVENTIONS:  - Assess for changes in respiratory status  - Assess for changes in mentation and behavior  - Position to facilitate oxygenation and minimize respiratory effort  - Oxygen administered by appropriate delivery if ordered  - Initiate smoking cessation education as indicated  - Encourage broncho-pulmonary hygiene including cough, deep breathe, Incentive Spirometry  - Assess the need for suctioning and aspirate as needed  - Assess and instruct to report SOB or any respiratory difficulty  - Respiratory Therapy support as indicated  Outcome: Progressing     Problem: GENITOURINARY - ADULT  Goal: Maintains or returns to baseline urinary function  Description: INTERVENTIONS:  - Assess urinary function  - Encourage oral fluids to ensure adequate hydration if ordered  - Administer IV fluids as ordered to ensure adequate hydration  - Administer ordered medications as needed  - Offer frequent toileting  - Follow urinary retention protocol if ordered  Outcome: Progressing  Goal: Absence of urinary retention  Description: INTERVENTIONS:  - Assess patients ability to void and empty bladder  - Monitor I/O  - Bladder scan as needed  - Discuss with physician/AP medications to alleviate retention as needed  - Discuss catheterization for long term situations as appropriate  Outcome: Progressing     Problem: METABOLIC, FLUID AND ELECTROLYTES - ADULT  Goal: Electrolytes maintained within normal limits  Description: INTERVENTIONS:  - Monitor labs and assess patient for signs and symptoms of electrolyte imbalances  - Administer electrolyte replacement as ordered  - Monitor response to electrolyte replacements, including repeat lab results as appropriate  - Instruct patient on fluid and nutrition as appropriate  Outcome: Progressing  Goal: Fluid balance maintained  Description: INTERVENTIONS:  - Monitor labs   - Monitor I/O and WT  - Instruct patient on fluid and nutrition as appropriate  - Assess for signs & symptoms of volume excess or deficit  Outcome: Progressing  Goal: Glucose maintained within target range  Description: INTERVENTIONS:  - Monitor Blood Glucose as ordered  - Assess for signs and symptoms of hyperglycemia and hypoglycemia  - Administer ordered medications to maintain glucose within target range  - Assess nutritional intake and initiate nutrition service referral as needed  Outcome: Progressing     Problem: SKIN/TISSUE INTEGRITY - ADULT  Goal: Skin integrity remains intact  Description: INTERVENTIONS  - Identify patients at risk for skin breakdown  - Assess and monitor skin integrity  - Assess and monitor nutrition and hydration status  - Monitor labs (i e  albumin)  - Assess for incontinence   - Turn and reposition patient  - Assist with mobility/ambulation  - Relieve pressure over bony prominences  - Avoid friction and shearing  - Provide appropriate hygiene as needed including keeping skin clean and dry  - Evaluate need for skin moisturizer/barrier cream  - Collaborate with interdisciplinary team (i e  Nutrition, Rehabilitation, etc )   - Patient/family teaching  Outcome: Progressing [No Acute Distress] : no acute distress [Well Nourished] : well nourished [Well Developed] : well developed [Well-Appearing] : well-appearing [Normal Sclera/Conjunctiva] : normal sclera/conjunctiva [PERRL] : pupils equal round and reactive to light [Normal Outer Ear/Nose] : the outer ears and nose were normal in appearance [Normal Oropharynx] : the oropharynx was normal [No JVD] : no jugular venous distention [Supple] : supple [No Lymphadenopathy] : no lymphadenopathy [No Respiratory Distress] : no respiratory distress  [Clear to Auscultation] : lungs were clear to auscultation bilaterally [No Accessory Muscle Use] : no accessory muscle use [Normal Rate] : normal rate  [Regular Rhythm] : with a regular rhythm [Normal S1, S2] : normal S1 and S2 [No Edema] : there was no peripheral edema [No Extremity Clubbing/Cyanosis] : no extremity clubbing/cyanosis [Soft] : abdomen soft [Non Tender] : non-tender [Non-distended] : non-distended [No HSM] : no HSM [Normal Bowel Sounds] : normal bowel sounds [Normal Anterior Cervical Nodes] : no anterior cervical lymphadenopathy [No Rash] : no rash [No Focal Deficits] : no focal deficits [Normal Affect] : the affect was normal [Normal Insight/Judgement] : insight and judgment were intact

## 2020-12-20 NOTE — RESPIRATORY THERAPY NOTE
12/20/20 0749   Non-Invasive Information   Interface HFNC prongs   Non-Invasive Ventilation Mode HFNC (High flow)   $ Intermittent NIV Yes   SpO2 93 %   $ Pulse Oximetry Spot Check Charge Completed   Resp Comments pt remain on HFNC  titrated fior2 to 75%   Non-Invasive Settings   FiO2 (%) 75   Flow (lpm) 60   Temperature (Set) 31   Non-Invasive Readings   Heater Temperature (Obs) 31   Skin Intervention Skin intact   Maintenance   Water bag changed No

## 2020-12-20 NOTE — ASSESSMENT & PLAN NOTE
· Appreciate cardiology consultation/recommendations  · Metoprolol 12 5 mg b i d  · Cardizem 24 hour capsule 120 mg daily  · Continue Eliquis 5 mg b i d  For systemic anticoagulation; chads Vasc 4   Given recent fall resulting in subdural would suggest to discuss risk v benefit of long-term anticoagulation

## 2020-12-21 ENCOUNTER — APPOINTMENT (INPATIENT)
Dept: RADIOLOGY | Facility: HOSPITAL | Age: 70
DRG: 177 | End: 2020-12-21
Payer: COMMERCIAL

## 2020-12-21 LAB
ALBUMIN SERPL BCP-MCNC: 2.2 G/DL (ref 3.5–5)
ALP SERPL-CCNC: 94 U/L (ref 46–116)
ALT SERPL W P-5'-P-CCNC: 86 U/L (ref 12–78)
ANION GAP SERPL CALCULATED.3IONS-SCNC: 9 MMOL/L (ref 4–13)
AST SERPL W P-5'-P-CCNC: 51 U/L (ref 5–45)
BILIRUB SERPL-MCNC: 0.7 MG/DL (ref 0.2–1)
BUN SERPL-MCNC: 53 MG/DL (ref 5–25)
CALCIUM ALBUM COR SERPL-MCNC: 10.3 MG/DL (ref 8.3–10.1)
CALCIUM SERPL-MCNC: 8.9 MG/DL (ref 8.3–10.1)
CHLORIDE SERPL-SCNC: 104 MMOL/L (ref 100–108)
CO2 SERPL-SCNC: 24 MMOL/L (ref 21–32)
CREAT SERPL-MCNC: 0.98 MG/DL (ref 0.6–1.3)
CRP SERPL QL: 9.1 MG/L
D DIMER PPP FEU-MCNC: 2.29 UG/ML FEU
ERYTHROCYTE [DISTWIDTH] IN BLOOD BY AUTOMATED COUNT: 16.9 % (ref 11.6–15.1)
FERRITIN SERPL-MCNC: 746 NG/ML (ref 8–388)
GFR SERPL CREATININE-BSD FRML MDRD: 77 ML/MIN/1.73SQ M
GLUCOSE SERPL-MCNC: 107 MG/DL (ref 65–140)
GLUCOSE SERPL-MCNC: 109 MG/DL (ref 65–140)
GLUCOSE SERPL-MCNC: 114 MG/DL (ref 65–140)
GLUCOSE SERPL-MCNC: 134 MG/DL (ref 65–140)
GLUCOSE SERPL-MCNC: 140 MG/DL (ref 65–140)
GLUCOSE SERPL-MCNC: 161 MG/DL (ref 65–140)
GLUCOSE SERPL-MCNC: 162 MG/DL (ref 65–140)
GLUCOSE SERPL-MCNC: 228 MG/DL (ref 65–140)
GLUCOSE SERPL-MCNC: 241 MG/DL (ref 65–140)
GLUCOSE SERPL-MCNC: 243 MG/DL (ref 65–140)
GLUCOSE SERPL-MCNC: 89 MG/DL (ref 65–140)
GLUCOSE SERPL-MCNC: 91 MG/DL (ref 65–140)
GLUCOSE SERPL-MCNC: 95 MG/DL (ref 65–140)
GLUCOSE SERPL-MCNC: 99 MG/DL (ref 65–140)
HCT VFR BLD AUTO: 35.4 % (ref 36.5–49.3)
HGB BLD-MCNC: 12 G/DL (ref 12–17)
MAGNESIUM SERPL-MCNC: 2.3 MG/DL (ref 1.6–2.6)
MCH RBC QN AUTO: 30.8 PG (ref 26.8–34.3)
MCHC RBC AUTO-ENTMCNC: 33.9 G/DL (ref 31.4–37.4)
MCV RBC AUTO: 91 FL (ref 82–98)
NRBC BLD AUTO-RTO: 0 /100 WBCS
PHOSPHATE SERPL-MCNC: 3.8 MG/DL (ref 2.3–4.1)
PLATELET # BLD AUTO: 179 THOUSANDS/UL (ref 149–390)
PMV BLD AUTO: 10.6 FL (ref 8.9–12.7)
POTASSIUM SERPL-SCNC: 5.2 MMOL/L (ref 3.5–5.3)
PROT SERPL-MCNC: 6.2 G/DL (ref 6.4–8.2)
RBC # BLD AUTO: 3.9 MILLION/UL (ref 3.88–5.62)
SODIUM SERPL-SCNC: 137 MMOL/L (ref 136–145)
WBC # BLD AUTO: 13.11 THOUSAND/UL (ref 4.31–10.16)

## 2020-12-21 PROCEDURE — 86140 C-REACTIVE PROTEIN: CPT | Performed by: NURSE PRACTITIONER

## 2020-12-21 PROCEDURE — 99233 SBSQ HOSP IP/OBS HIGH 50: CPT | Performed by: ANESTHESIOLOGY

## 2020-12-21 PROCEDURE — 82948 REAGENT STRIP/BLOOD GLUCOSE: CPT

## 2020-12-21 PROCEDURE — 84100 ASSAY OF PHOSPHORUS: CPT | Performed by: NURSE PRACTITIONER

## 2020-12-21 PROCEDURE — 85027 COMPLETE CBC AUTOMATED: CPT | Performed by: NURSE PRACTITIONER

## 2020-12-21 PROCEDURE — 85379 FIBRIN DEGRADATION QUANT: CPT | Performed by: NURSE PRACTITIONER

## 2020-12-21 PROCEDURE — 71045 X-RAY EXAM CHEST 1 VIEW: CPT

## 2020-12-21 PROCEDURE — 80053 COMPREHEN METABOLIC PANEL: CPT | Performed by: NURSE PRACTITIONER

## 2020-12-21 PROCEDURE — 94760 N-INVAS EAR/PLS OXIMETRY 1: CPT

## 2020-12-21 PROCEDURE — 83735 ASSAY OF MAGNESIUM: CPT | Performed by: NURSE PRACTITIONER

## 2020-12-21 PROCEDURE — 82728 ASSAY OF FERRITIN: CPT | Performed by: NURSE PRACTITIONER

## 2020-12-21 PROCEDURE — 94660 CPAP INITIATION&MGMT: CPT

## 2020-12-21 RX ADMIN — GABAPENTIN 600 MG: 300 CAPSULE ORAL at 08:02

## 2020-12-21 RX ADMIN — ACETAMINOPHEN 650 MG: 325 TABLET, FILM COATED ORAL at 14:41

## 2020-12-21 RX ADMIN — SODIUM CHLORIDE 3 UNITS/HR: 9 INJECTION, SOLUTION INTRAVENOUS at 10:21

## 2020-12-21 RX ADMIN — SODIUM CHLORIDE 1 UNITS/HR: 9 INJECTION, SOLUTION INTRAVENOUS at 18:13

## 2020-12-21 RX ADMIN — DILTIAZEM HYDROCHLORIDE 120 MG: 120 CAPSULE, COATED, EXTENDED RELEASE ORAL at 08:02

## 2020-12-21 RX ADMIN — Medication 1 TABLET: at 08:03

## 2020-12-21 RX ADMIN — APIXABAN 5 MG: 5 TABLET, FILM COATED ORAL at 18:14

## 2020-12-21 RX ADMIN — Medication 12.5 MG: at 08:02

## 2020-12-21 RX ADMIN — GABAPENTIN 600 MG: 300 CAPSULE ORAL at 16:02

## 2020-12-21 RX ADMIN — FAMOTIDINE 20 MG: 20 TABLET ORAL at 08:01

## 2020-12-21 RX ADMIN — Medication 2000 UNITS: at 08:03

## 2020-12-21 RX ADMIN — TRAZODONE HYDROCHLORIDE 25 MG: 50 TABLET ORAL at 22:53

## 2020-12-21 RX ADMIN — CEFEPIME HYDROCHLORIDE 2000 MG: 2 INJECTION, POWDER, FOR SOLUTION INTRAVENOUS at 18:24

## 2020-12-21 RX ADMIN — NICOTINE 7 MG/24 HR DAILY TRANSDERMAL PATCH 7 MG: at 10:19

## 2020-12-21 RX ADMIN — VANCOMYCIN HYDROCHLORIDE 750 MG: 750 INJECTION, SOLUTION INTRAVENOUS at 20:49

## 2020-12-21 RX ADMIN — DOCUSATE SODIUM AND SENNOSIDES 2 TABLET: 8.6; 5 TABLET ORAL at 08:02

## 2020-12-21 RX ADMIN — Medication 12.5 MG: at 22:52

## 2020-12-21 RX ADMIN — GABAPENTIN 600 MG: 300 CAPSULE ORAL at 22:52

## 2020-12-21 RX ADMIN — APIXABAN 5 MG: 5 TABLET, FILM COATED ORAL at 08:01

## 2020-12-21 RX ADMIN — MELATONIN 3 MG: at 22:53

## 2020-12-21 RX ADMIN — ATORVASTATIN CALCIUM 40 MG: 40 TABLET, FILM COATED ORAL at 22:53

## 2020-12-21 NOTE — ASSESSMENT & PLAN NOTE
Completed treatment under moderate pathway; now requiring   - Oxygen:Continue   - Vitamin C/Zinc/Statin: x 7 days  Complete  - Steroids Completed 10 day of dexamethasone 6 mg daily  - Convalescent Plasma: 12/11  - Remdesivir: x 4 complete  12/10-12/14  - Actemra: Will check inflammatory markers, procalcitonin  Although improving Actemra may be considered given his tenuous status    - Anticoagulation:  Eliquis 5 mg b i d   - Encourage self proning/aggressive pulmonary toilet

## 2020-12-21 NOTE — RESPIRATORY THERAPY NOTE
12/20/20 0247   Non-Invasive Information   Interface HFNC prongs   Non-Invasive Ventilation Mode HFNC (High flow)   SpO2 95 %   $ Pulse Oximetry Spot Check Charge Completed   Resp Comments no changes made   Non-Invasive Settings   FiO2 (%) 70   Flow (lpm) 55   Temperature (Set) 31   Non-Invasive Readings   Heater Temperature (Obs) 31   Skin Intervention Skin intact

## 2020-12-21 NOTE — RESPIRATORY THERAPY NOTE
12/21/20 5746   Non-Invasive Information   Interface HFNC prongs   Non-Invasive Ventilation Mode HFNC (High flow)   SpO2 (!) 82 %   $ Pulse Oximetry Spot Check Charge Completed   Resp Comments increasef fio2 to 80% due to low sat   Non-Invasive Settings   FiO2 (%) 80   Flow (lpm) 55   Temperature (Set) 31   Non-Invasive Readings   Heater Temperature (Obs) 31   Skin Intervention Skin intact

## 2020-12-21 NOTE — RESPIRATORY THERAPY NOTE
12/21/20 1542   Non-Invasive Information   Interface HFNC prongs   Non-Invasive Ventilation Mode HFNC (High flow)   SpO2 93 %   $ Pulse Oximetry Spot Check Charge Completed   Non-Invasive Settings   FiO2 (%) 80   Flow (lpm) 55   Temperature (Set) 31   Non-Invasive Readings   Heater Temperature (Obs) 31   Skin Intervention Skin intact   Maintenance   Water bag changed No

## 2020-12-21 NOTE — RESPIRATORY THERAPY NOTE
12/21/20 0425   Non-Invasive Information   Interface HFNC prongs   Non-Invasive Ventilation Mode HFNC (High flow)   SpO2 96 %   $ Pulse Oximetry Spot Check Charge Completed   Resp Comments no changes mad, continue to try and titrated the patient's settngs as tolerated   Non-Invasive Settings   FiO2 (%) 70   Flow (lpm) 55   Temperature (Set) 31   Non-Invasive Readings   Heater Temperature (Obs) 31   Skin Intervention Skin intact

## 2020-12-21 NOTE — RESPIRATORY THERAPY NOTE
12/20/20 2004   Oxygen Therapy/Pulse Ox   O2 Device High flow nasal cannula   O2 Therapy Oxygen humidified   FiO2 (%) 70   O2 Flow Rate (L/min) 55 L/min   SpO2 92 %   SpO2 Activity At Rest   $ Pulse Oximetry Spot Check Charge Completed

## 2020-12-21 NOTE — RESPIRATORY THERAPY NOTE
12/21/20 1117   Non-Invasive Information   Interface HFNC prongs   Non-Invasive Ventilation Mode HFNC (High flow)   SpO2 95 %   $ Pulse Oximetry Spot Check Charge Completed   Resp Comments adjusted Fi02 to 80% had climbed to 89%, saturations 95%   Non-Invasive Settings   FiO2 (%) 80   Flow (lpm) 55   Temperature (Set) 31   Non-Invasive Readings   [place]ater Temperature (Obs) 31   Skin Intervention Skin intact   RT Ventilator Management Note  [place] 65 y o  male MRN: [id number]  Unit/Bed#:  Encounter: [id number]      Daily Screen     No data found in the last 10 encounters              Physical Exam:          Resp Comments: (P) adjusted Fi02 to 80% had climbed to 89%, saturations 95%

## 2020-12-21 NOTE — RESPIRATORY THERAPY NOTE
12/21/20 0758   Non-Invasive Information   Interface HFNC prongs   Non-Invasive Ventilation Mode HFNC (High flow)   $ Intermittent NIV Yes   SpO2 (!) 88 %   $ Pulse Oximetry Spot Check Charge Completed   Non-Invasive Settings   FiO2 (%) 70   Flow (lpm) 55   Temperature (Set) 31   Non-Invasive Readings   [place] Temperature (Obs) 31   Skin Intervention Skin intact   Maintenance   Water bag changed Yes

## 2020-12-21 NOTE — ASSESSMENT & PLAN NOTE
· Secondary to COVID-19 infection  Bacterial super infection less likely secondary to progressive nature of respiratory failure  · Plan as noted above  · Will hold further steroid therapy  · May consider Actemra if remains tenuous  Check procalcitonin  Trend inflammatory markers  · Will observe off antibiotics at this time  · Encourage good incentive spirometry/pulmonary toilet

## 2020-12-21 NOTE — PROGRESS NOTES
Progress Note Stephanie Byrd 1950, 65 y o  male MRN: [id number]    Unit/Bed#:  Encounter: [id number]    Primary Care Provider: Nitesh Latif MD   Date and time admitted to hospital: 12/10/2020 12:59 PM        * COVID-19 virus infection  Assessment & Plan  Completed treatment under moderate pathway; now requiring   - Oxygen:Continue   - Vitamin C/Zinc/Statin: x 7 days  Complete  - Steroids Completed 10 day of dexamethasone 6 mg daily  - Convalescent Plasma: 12/11  - Remdesivir: x 4 complete  12/10-12/14  - Actemra: Will check inflammatory markers, procalcitonin  Although improving Actemra may be considered given his tenuous status  - Anticoagulation:  Eliquis 5 mg b i d   - Encourage self proning/aggressive pulmonary toilet      Acute respiratory failure with hypoxia (HCC)  Assessment & Plan  · Secondary to COVID-19 infection  Bacterial super infection less likely secondary to progressive nature of respiratory failure  · Plan as noted above  · Will hold further steroid therapy  · May consider Actemra if remains tenuous  Check procalcitonin  Trend inflammatory markers  · Will observe off antibiotics at this time  · Encourage good incentive spirometry/pulmonary toilet  Acute metabolic encephalopathy  Assessment & Plan  · Multifactorial in setting of baseline cognitive dysfunction, prolonged hospitalization and hypoxemia  · CT head 12/18-negative for acute intracranial abnormality/hemorrhage  · Frequent orientation  Regulate sleep/wake cycles  · Daily CAM ICU    New onset a-fib [facility])  Assessment & Plan  · Appreciate cardiology consultation/recommendations  · Metoprolol 12 5 mg b i d  · Cardizem 24 hour capsule 120 mg daily  · Continue Eliquis 5 mg b i d  For systemic anticoagulation; chads Vasc 4   Given recent fall resulting in subdural would suggest to discuss risk v benefit of long-term anticoagulation  · Currently in sinus rhythm    Positive blood culture  Assessment & Plan  · Suspect contaminant in setting of [1/2 blood culture] Staph aureus coagulase negative     Right Subdural hematoma ([facility] [address] ) 09/2020  Assessment & Plan  · Suspected traumatic subdural hematoma 9/2020 in setting of traumatic fall  · Left upper extremity residual weakness  · Given requirements for systemic anticoagulation case reviewed with Neurosurgery clearing patient for anticoagulant    CVA (cerebral vascular accident) [facility])  Assessment & Plan  · 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  · Left upper extremity residual deficits  Essential hypertension  Assessment & Plan  · Continue metoprolol 12 5 mg b i d   · Diltiazem 24 hr capsule 120 mg daily    COPD (chronic obstructive pulmonary disease) ([facility] [address] )  Assessment & Plan  · Not in acute exacerbation  · Bronchodilators as needed    Other hyperlipidemia  Assessment & Plan  · Continue Lipitor 40 mg daily  Hyperglycemia  Assessment & Plan  · Off insulin  · Suspect hyperglycemia was related to high-dose steroids, since discontinued      ----------------------------------------------------------------------------------------  HPI/24hr events:  Mental status improved, less agitated  Following commands and interactive with nursing staff    Remains dependent on high-flow nasal cannula at 60 L, weaning FiO2 as tolerated    Disposition: Continue Stepdown Level 1 level of care   Code Status: Level 3 - DNAR and DNI  ---------------------------------------------------------------------------------------  SUBJECTIVE  New complaints offered    Review of Systems  Review of systems was reviewed and negative unless stated above in HPI/24-hour events   ---------------------------------------------------------------------------------------  OBJECTIVE    Vitals   Vitals:    12/20/20 1900 12/20/20 2004 12/20/20 2215 12/20/20 2309   BP: 129/59  107/62 108/67   BP Location: Right arm   Right arm   Pulse: 77 76 74 67   Resp: (!) 23 22  22   Temp: 98 °F (36 7 °C)   98 6 °F (37 °C)   TempSrc: Oral Axillary   SpO2: 90% 92%  95%   Weight:       Height:         Temp (24hrs), Av 2 °F (36 8 °C), Min:97 8 °F (36 6 °C), Max:98 6 °F (37 °C)  Current: Temperature: 98 6 °F (37 °C)          Respiratory:  SpO2: SpO2: 96 %  Nasal Cannula O2 Flow Rate (L/min): 15 L/min    Invasive/non-invasive ventilation settings   Respiratory    Lab Data (Last 4 hours)    None         O2/Vent Data (Last 4 hours)    None                Physical Exam  GEN:  Ill-appearing, appears stated age, no acute distress  HEENT:  Sclera anicteric, mucous membranes pink and moist, conjunctiva pink, no robb/rhinorrhea  Neck:  No lymphadenopathy, normal ROM, supple, no bruits  CV :  S1S2, regular, no murmurs, rubs or gallops  Intact distal pulses  No JVD  Resp:  Lungs diminished throughout  No subq air or crepitus  Symmetrical expansion    Nonproductive cough   GI :  Abd soft, nontender, no guarding/rebound, nondistended, hypoactive bowel sounds X4 quads, no organomegaly appreciated  Neuro:  Left-sided weakness, speech garbled, GCS 14-confused, was agitated and more appropriate      Laboratory and Diagnostics:  Results from last 7 days   Lab Units 20  0507 20  0452 20  0543 12/15/20  0440 20  0546   WBC Thousand/uL 12 23* 9 93 5 35 6 60 7 56   HEMOGLOBIN g/dL 12 3 12 3 11 0* 11 3* 10 9*   HEMATOCRIT % 36 1* 36 5 33 1* 33 2* 32 4*   PLATELETS Thousands/uL 210 213 201 197 193   NEUTROS PCT %  --   --   --   --  87*   BANDS PCT % 1  --   --   --   --    MONOS PCT %  --   --   --   --  5   MONO PCT % 3*  --   --   --   --      Results from last 7 days   Lab Units 20  0507 20  0838 20  0543 12/15/20  0440   SODIUM mmol/L 137 138 134* 137   POTASSIUM mmol/L 5 5* 5 1 4 7 4 7   CHLORIDE mmol/L 105 104 101 104   CO2 mmol/L 24 28 22 22   ANION GAP mmol/L 8 6 11 11   BUN mg/dL 58* 38* 37* 29*   CREATININE mg/dL 1 11 1 11 1 04 1 02   CALCIUM mg/dL 8 8 8 8 8 4 8 7   GLUCOSE RANDOM mg/dL 177* 271* 389* 262*   ALT U/L 66 --  41 45   AST U/L 35  --  24 32   ALK PHOS U/L 104  --  119* 121*   ALBUMIN g/dL 2 3*  --  2 2* 2 3*   TOTAL BILIRUBIN mg/dL 0 60  --  0 30 0 40     Results from last 7 days   Lab Units 12/20/20  0507 12/18/20  0838   MAGNESIUM mg/dL 2 4 2 2   PHOSPHORUS mg/dL 5 2*  --                    ABG:  Results from last 7 days   Lab Units 12/20/20  0630   PH ART  7 422   PCO2 ART mm Hg 34 9*   PO2 ART mm Hg 84 7   HCO3 ART mmol/L 22 2   BASE EXC ART mmol/L -1 6   ABG SOURCE  Radial, Right     VBG:  Results from last 7 days   Lab Units 12/20/20  0630   ABG SOURCE  Radial, Right     Results from last 7 days   Lab Units 12/20/20  0507 12/19/20  1349 12/15/20  0440 12/14/20  0546   PROCALCITONIN ng/ml <0 05 <0 05 <0 05 <0 05       Micro        EKG:  Sinus rhythm  Imaging: I have personally reviewed pertinent reports  and I have personally reviewed pertinent films in PACS    Intake and Output  I/O       12/19 0701 - 12/20 0700 12/20 0701 - 12/21 0700    P  O  720 840    I V  (mL/kg) 13 3 (0 2) 67 1 (1 2)    Total Intake(mL/kg) 733 3 (13 5) 907 1 (16 7)    Urine (mL/kg/hr) 2250 (1 7) 625 (0 5)    Total Output 2250 625    Net -1516 7 +282 1                Height and Weights   Height: 5' 4" (162 6 cm)  IBW: 59 2 kg  Body mass index is 20 55 kg/m²  Weight (last 2 days)     Date/Time   Weight    12/20/20 0600   54 3 (119 71)    12/19/20 0300   51 (112 43)                Nutrition       Diet Orders   (From admission, onward)             Start     Ordered    12/20/20 0834  Diet Regular; Regular House  Diet effective now     Question Answer Comment   Diet Type Regular    Regular Regular House    RD to adjust diet per protocol?  Yes        12/20/20 0833    12/18/20 1455  Dietary nutrition supplements  Once     Question Answer Comment   Select Supplement: Glucerna-Vanilla    Frequency Breakfast, Dinner        12/18/20 1458                  Active Medications  Scheduled Meds:  Current Facility-Administered Medications   Medication Dose Route Frequency Provider Last Rate    acetaminophen  650 mg Oral Q6H PRN Ana Rosa Tolentino PA-C      apixaban  5 mg Oral BID Ana Rosa Tolentino PA-C      atorvastatin  40 mg Oral HS Ana Rosa Tolentino PA-C      bisacodyl  10 mg Rectal Daily Teagan Stewart      cholecalciferol  2,000 Units Oral Daily Teagan Stewart      diltiazem  120 mg Oral Daily Teagan Stewart      famotidine  20 mg Oral Daily Teagan Stewart      gabapentin  600 mg Oral TID Ana Rosa Tolentino PA-C      insulin regular (HumuLIN R,NovoLIN R) infusion  0 3-21 Units/hr Intravenous Titrated Ana Rosa Tolentino PA-C 1 5 Units/hr (12/21/20 0239)    magnesium hydroxide  30 mL Oral Daily PRN Ana Rosa Tolentino PA-C      melatonin  3 mg Oral HS Ana Rosa Tolentino PA-C      metoprolol  2 5 mg Intravenous Q6H PRN Ana Rosa Tolentino PA-C      metoprolol tartrate  12 5 mg Oral Q12H [address] Teagan Stewart      multivitamin-minerals  1 tablet Oral Daily Teagan Stewart      nicotine  7 mg Transdermal Daily ZULMA Silva      ondansetron  4 mg Intravenous Q6H PRN Ana Rosa Tolentino PA-C      phenol  1 spray Mouth/Throat Q2H PRN Ana Rosa Tolentino PA-C      senna-docusate sodium  2 tablet Oral BID ZULMA Silva      traZODone  25 mg Oral HS Ana Rosa Tolentino PA-C       Continuous Infusions:  insulin regular (HumuLIN R,NovoLIN R) infusion, 0 3-21 Units/hr, Last Rate: 1 5 Units/hr (12/21/20 0239)      PRN Meds:   acetaminophen, 650 mg, Q6H PRN  magnesium hydroxide, 30 mL, Daily PRN  metoprolol, 2 5 mg, Q6H PRN  ondansetron, 4 mg, Q6H PRN  phenol, 1 spray, Q2H PRN        Invasive Devices Review  Invasive Devices     Peripheral Intravenous Line            Peripheral IV 12/17/20 Right;Ventral (anterior) Forearm 3 days          Drain            External Urinary Catheter Medium 9 days                Rationale for remaining devices:  Nonapplicable  ---------------------------------------------------------------------------------------  Advance Directive and Living Will:      Power of :    POLST:    ---------------------------------------------------------------------------------------  Care Time Delivered:   No Critical Care time spent       ZULMA Silva      Portions of the record may have been created with voice recognition software  Occasional wrong word or "sound a like" substitutions may have occurred due to the inherent limitations of voice recognition software    Read the chart carefully and recognize, using context, where substitutions have occurred

## 2020-12-21 NOTE — ASSESSMENT & PLAN NOTE
· Appreciate cardiology consultation/recommendations  · Metoprolol 12 5 mg b i d  · Cardizem 24 hour capsule 120 mg daily  · Continue Eliquis 5 mg b i d  For systemic anticoagulation; chads Vasc 4   Given recent fall resulting in subdural would suggest to discuss risk v benefit of long-term anticoagulation  · Currently in sinus rhythm

## 2020-12-22 LAB
ALBUMIN SERPL BCP-MCNC: 1.8 G/DL (ref 3.5–5)
ALP SERPL-CCNC: 98 U/L (ref 46–116)
ALT SERPL W P-5'-P-CCNC: 93 U/L (ref 12–78)
ANION GAP SERPL CALCULATED.3IONS-SCNC: 9 MMOL/L (ref 4–13)
AST SERPL W P-5'-P-CCNC: 50 U/L (ref 5–45)
BILIRUB SERPL-MCNC: 1.2 MG/DL (ref 0.2–1)
BUN SERPL-MCNC: 46 MG/DL (ref 5–25)
CALCIUM ALBUM COR SERPL-MCNC: 10.7 MG/DL (ref 8.3–10.1)
CALCIUM SERPL-MCNC: 8.9 MG/DL (ref 8.3–10.1)
CHLORIDE SERPL-SCNC: 102 MMOL/L (ref 100–108)
CO2 SERPL-SCNC: 24 MMOL/L (ref 21–32)
CREAT SERPL-MCNC: 0.91 MG/DL (ref 0.6–1.3)
CRP SERPL QL: 184.8 MG/L
D DIMER PPP FEU-MCNC: 3.51 UG/ML FEU
ERYTHROCYTE [DISTWIDTH] IN BLOOD BY AUTOMATED COUNT: 17.1 % (ref 11.6–15.1)
FERRITIN SERPL-MCNC: 1070 NG/ML (ref 8–388)
GFR SERPL CREATININE-BSD FRML MDRD: 84 ML/MIN/1.73SQ M
GLUCOSE SERPL-MCNC: 106 MG/DL (ref 65–140)
GLUCOSE SERPL-MCNC: 121 MG/DL (ref 65–140)
GLUCOSE SERPL-MCNC: 149 MG/DL (ref 65–140)
GLUCOSE SERPL-MCNC: 251 MG/DL (ref 65–140)
GLUCOSE SERPL-MCNC: 269 MG/DL (ref 65–140)
GLUCOSE SERPL-MCNC: 297 MG/DL (ref 65–140)
GLUCOSE SERPL-MCNC: 348 MG/DL (ref 65–140)
GLUCOSE SERPL-MCNC: 66 MG/DL (ref 65–140)
HCT VFR BLD AUTO: 32.5 % (ref 36.5–49.3)
HGB BLD-MCNC: 11.1 G/DL (ref 12–17)
MAGNESIUM SERPL-MCNC: 2 MG/DL (ref 1.6–2.6)
MCH RBC QN AUTO: 30.5 PG (ref 26.8–34.3)
MCHC RBC AUTO-ENTMCNC: 34.2 G/DL (ref 31.4–37.4)
MCV RBC AUTO: 89 FL (ref 82–98)
NRBC BLD AUTO-RTO: 0 /100 WBCS
PHOSPHATE SERPL-MCNC: 3.7 MG/DL (ref 2.3–4.1)
PLATELET # BLD AUTO: 144 THOUSANDS/UL (ref 149–390)
PMV BLD AUTO: 11.2 FL (ref 8.9–12.7)
POTASSIUM SERPL-SCNC: 4.9 MMOL/L (ref 3.5–5.3)
PROT SERPL-MCNC: 5.8 G/DL (ref 6.4–8.2)
RBC # BLD AUTO: 3.64 MILLION/UL (ref 3.88–5.62)
SODIUM SERPL-SCNC: 135 MMOL/L (ref 136–145)
WBC # BLD AUTO: 11.05 THOUSAND/UL (ref 4.31–10.16)

## 2020-12-22 PROCEDURE — 86140 C-REACTIVE PROTEIN: CPT | Performed by: NURSE PRACTITIONER

## 2020-12-22 PROCEDURE — 80053 COMPREHEN METABOLIC PANEL: CPT | Performed by: NURSE PRACTITIONER

## 2020-12-22 PROCEDURE — 99233 SBSQ HOSP IP/OBS HIGH 50: CPT | Performed by: ANESTHESIOLOGY

## 2020-12-22 PROCEDURE — 85027 COMPLETE CBC AUTOMATED: CPT | Performed by: NURSE PRACTITIONER

## 2020-12-22 PROCEDURE — 82948 REAGENT STRIP/BLOOD GLUCOSE: CPT

## 2020-12-22 PROCEDURE — 83735 ASSAY OF MAGNESIUM: CPT | Performed by: NURSE PRACTITIONER

## 2020-12-22 PROCEDURE — 82728 ASSAY OF FERRITIN: CPT | Performed by: NURSE PRACTITIONER

## 2020-12-22 PROCEDURE — 94760 N-INVAS EAR/PLS OXIMETRY 1: CPT

## 2020-12-22 PROCEDURE — 99222 1ST HOSP IP/OBS MODERATE 55: CPT | Performed by: INTERNAL MEDICINE

## 2020-12-22 PROCEDURE — 85379 FIBRIN DEGRADATION QUANT: CPT | Performed by: NURSE PRACTITIONER

## 2020-12-22 PROCEDURE — 84100 ASSAY OF PHOSPHORUS: CPT | Performed by: NURSE PRACTITIONER

## 2020-12-22 RX ORDER — LORAZEPAM 2 MG/ML
0.5 INJECTION INTRAMUSCULAR EVERY 2 HOUR PRN
Status: DISCONTINUED | OUTPATIENT
Start: 2020-12-22 | End: 2020-12-24

## 2020-12-22 RX ORDER — POLYETHYLENE GLYCOL 3350 17 G/17G
17 POWDER, FOR SOLUTION ORAL DAILY
Status: DISCONTINUED | OUTPATIENT
Start: 2020-12-22 | End: 2020-12-22

## 2020-12-22 RX ORDER — HALOPERIDOL 5 MG/ML
0.5 INJECTION INTRAMUSCULAR
Status: DISCONTINUED | OUTPATIENT
Start: 2020-12-22 | End: 2020-12-24

## 2020-12-22 RX ADMIN — NICOTINE 7 MG/24 HR DAILY TRANSDERMAL PATCH 7 MG: at 09:58

## 2020-12-22 RX ADMIN — FAMOTIDINE 20 MG: 20 TABLET ORAL at 09:56

## 2020-12-22 RX ADMIN — MORPHINE SULFATE 2 MG: 2 INJECTION, SOLUTION INTRAMUSCULAR; INTRAVENOUS at 16:47

## 2020-12-22 RX ADMIN — Medication 12.5 MG: at 09:55

## 2020-12-22 RX ADMIN — VANCOMYCIN HYDROCHLORIDE 750 MG: 750 INJECTION, SOLUTION INTRAVENOUS at 06:39

## 2020-12-22 RX ADMIN — GABAPENTIN 600 MG: 300 CAPSULE ORAL at 09:56

## 2020-12-22 RX ADMIN — DOCUSATE SODIUM AND SENNOSIDES 2 TABLET: 8.6; 5 TABLET ORAL at 09:57

## 2020-12-22 RX ADMIN — CEFEPIME HYDROCHLORIDE 2000 MG: 2 INJECTION, POWDER, FOR SOLUTION INTRAVENOUS at 06:00

## 2020-12-22 RX ADMIN — Medication 2000 UNITS: at 09:56

## 2020-12-22 RX ADMIN — Medication 1 TABLET: at 09:56

## 2020-12-22 RX ADMIN — APIXABAN 5 MG: 5 TABLET, FILM COATED ORAL at 09:56

## 2020-12-22 RX ADMIN — DILTIAZEM HYDROCHLORIDE 120 MG: 120 CAPSULE, COATED, EXTENDED RELEASE ORAL at 09:35

## 2020-12-22 NOTE — ASSESSMENT & PLAN NOTE
· Secondary to COVID-19 infection  Bacterial super infection less likely secondary to progressive nature of respiratory failure  · Plan as noted above  · Will hold further steroid therapy  · May consider Actemra if remains tenuous  Check procalcitonin  Trend inflammatory markers  · Will observe off antibiotics at this time  · Encourage good incentive spirometry/pulmonary toilet    · Goals of care discussion today

## 2020-12-22 NOTE — RESPIRATORY THERAPY NOTE
12/22/20 0035   Non-Invasive Information   Interface HFNC prongs   Non-Invasive Ventilation Mode HFNC (High flow)   SpO2 (!) 87 %   $ Pulse Oximetry Spot Check Charge Completed   Resp Comments pt was found off of the nasal cannula and placed back on, no chagnes were made to the hiflow settings water bag was replaced   Non-Invasive Settings   FiO2 (%) 80   Flow (lpm) 55   Temperature (Set) 31   Non-Invasive Readings   Heater Temperature (Obs) 31   Skin Intervention Skin intact

## 2020-12-22 NOTE — UTILIZATION REVIEW
Continued Stay Review    Date: 12/22/20                     Current Patient Class: Inpatient   Current Level of Care: ICU to MEd/surg    HPI:70 y o  male initially admitted on 12/10  Assessment/Plan:   IV steroids completed  REmdesivir completed  CHeck inflammatory markers, procal   May consider actemra  Continue eliquis  Encourage self proning/aggressive pulmonary toilet  Hold further steroid therapy  Continue with high flow nasal cannula with decreased breath sounds  GCS-14  OBserve off abs      12/22 Palliative consult:  HFNC in place  Mildly labored  Patient has been escalated to high settings on high Flow and still has episodic desaturations  12/22 Respiratory note:  HFNC in place at 90%     Pertinent Labs/Diagnostic Results:       Results from last 7 days   Lab Units 12/22/20  0551 12/21/20  0540 12/20/20  0507 12/19/20  0452 12/16/20  0543   WBC Thousand/uL 11 05* 13 11* 12 23* 9 93 5 35   HEMOGLOBIN g/dL 11 1* 12 0 12 3 12 3 11 0*   HEMATOCRIT % 32 5* 35 4* 36 1* 36 5 33 1*   PLATELETS Thousands/uL 144* 179 210 213 201   BANDS PCT %  --   --  1  --   --          Results from last 7 days   Lab Units 12/22/20  0550 12/21/20  0540 12/20/20  0507 12/18/20  0838 12/16/20  0543   SODIUM mmol/L 135* 137 137 138 134*   POTASSIUM mmol/L 4 9 5 2 5 5* 5 1 4 7   CHLORIDE mmol/L 102 104 105 104 101   CO2 mmol/L 24 24 24 28 22   ANION GAP mmol/L 9 9 8 6 11   BUN mg/dL 46* 53* 58* 38* 37*   CREATININE mg/dL 0 91 0 98 1 11 1 11 1 04   EGFR ml/min/1 73sq m 84 77 66 66 71   CALCIUM mg/dL 8 9 8 9 8 8 8 8 8 4   MAGNESIUM mg/dL 2 0 2 3 2 4 2 2  --    PHOSPHORUS mg/dL 3 7 3 8 5 2*  --   --      Results from last 7 days   Lab Units 12/22/20  0550 12/21/20  0540 12/20/20  0507 12/16/20  0543   AST U/L 50* 51* 35 24   ALT U/L 93* 86* 66 41   ALK PHOS U/L 98 94 104 119*   TOTAL PROTEIN g/dL 5 8* 6 2* 6 3* 6 3*   ALBUMIN g/dL 1 8* 2 2* 2 3* 2 2*   TOTAL BILIRUBIN mg/dL 1 20* 0 70 0 60 0 30     Results from last 7 days   Lab Units 12/22/20  1109 12/22/20  0711 12/22/20  0410 12/22/20  0246 12/22/20  0001 12/21/20  2217 12/21/20 2015 12/21/20  1813 12/21/20  1601 12/21/20  1419 12/21/20  1158 12/21/20  1012   POC GLUCOSE mg/dl 297* 149* 106 66 251* 348* 241* 140 109 95 243* 228*     Results from last 7 days   Lab Units 12/22/20  0550 12/21/20  0540 12/20/20  0507 12/18/20  0838 12/16/20  0543   GLUCOSE RANDOM mg/dL 121 134 177* 271* 389*             No results found for: BETA-HYDROXYBUTYRATE   Results from last 7 days   Lab Units 12/20/20  0630 12/19/20  1154   PH ART  7 422 7 474*   PCO2 ART mm Hg 34 9* 31 5*   PO2 ART mm Hg 84 7 46 2*   HCO3 ART mmol/L 22 2 22 6   BASE EXC ART mmol/L -1 6 -0 2   O2 CONTENT ART mL/dL 19 4 14 9*   O2 HGB, ARTERIAL % 95 6 82 3*   ABG SOURCE  Radial, Right Radial, Right   NON VENT TYPE HFNC  HFNC Flow  --    HFNC FLOW LPM  60  --        Results from last 7 days   Lab Units 12/22/20  0550 12/21/20  0540 12/20/20  0507 12/19/20  1349 12/16/20  0543   D-DIMER QUANTITATIVE ug/ml FEU 3 51* 2 29* 3 10* 3 63* 1 38*           Results from last 7 days   Lab Units 12/20/20  0507 12/19/20  1349   PROCALCITONIN ng/ml <0 05 <0 05       Results from last 7 days   Lab Units 12/22/20  0551 12/21/20  0540 12/20/20  0507   FERRITIN ng/mL 1,070* 746* 761*       Results from last 7 days   Lab Units 12/22/20  0550 12/21/20  0540 12/20/20  0507 12/19/20  1349   CRP mg/L 184 8* 9 1* 10 1* 11 0*     Results from last 7 days   Lab Units 12/20/20  0507   TOTAL COUNTED  100           Vital Signs:   Time  Temp Pulse  Resp  BP  MAP (mmHg)  SpO2  FiO2 (%)  Calculated FIO2 (%) - Nasal Cannula  O2 Flow Rate (L/min)  Nasal Cannula O2 Flow Rate (L/min)  O2 Device  Patient Position - Orthostatic VS   12/22/20 1455  -- --  --  --  --  86 %Abnormal   --  --  --  --  --  --   12/22/20 1330  -- --  --  --  --  91 %  --  --  --  --  --  --   12/22/20 13:28:42  98 6 °F (37 °C) 91  20  123/68  86  88 %Abnormal   --  --  --  --  --  --   12/22/20 1100  -- --  --  --  --  --  --  80  60 L/min  15 L/min  High flow nasal cannula  --   12/22/20 1014  -- --  --  --  --  91 %  90  --  60 L/min  --  High flow nasal cannula  --   12/22/20 07:44:43  98 2 °F (36 8 °C) 80  --  104/58  73  91 %  --  --  --  --  --  --   12/22/20 07:43:55  98 2 °F (36 8 °C) 80  --  --  --  89 %Abnormal   --  --  --  --  --  --   12/22/20 0643  -- --  --  --  --  --  90  --  60 L/min  --  High flow nasal cannula  --   12/22/20 0441  -- --  --  --  --  --  90                 Medications:   Scheduled Medications:     Continuous IV Infusions:     PRN Meds:  haloperidol lactate, 0 5 mg, Intravenous, Q1H PRN  LORazepam, 0 5 mg, Intravenous, Q2H PRN  morphine injection, 2 mg, Intravenous, Q30 Min PRN  ondansetron, 4 mg, Intravenous, Q6H PRN  phenol, 1 spray, Mouth/Throat, Q2H PRN        Discharge Plan: D  Network Utilization Review Department  ATTENTION: Please call with any questions or concerns to [phone] and carefully listen to the prompts so that you are directed to the right person  All voicemails are confidential   Ramón Bates all requests for admission clinical reviews, approved or denied determinations and any other requests to dedicated fax number below belonging to the campus where the patient is receiving treatment   List of dedicated fax numbers for the Facilities:  [address] DENIALS (Administrative/Medical Necessity) [phone]   [address] (Maternity/NICU/Pediatrics) [phone]   [address] Cathi Guadarrama 0518 (  Anam Tejeda [facility] [address] [place] [address] [phone]   Minna Shah [address] [facility] [phone]   [place] [address] [facility] [phone]

## 2020-12-22 NOTE — RESPIRATORY THERAPY NOTE
12/22/20 0427   Non-Invasive Information   Interface HFNC prongs   Non-Invasive Ventilation Mode HFNC (High flow)   $ Pulse Oximetry Spot Check Charge Completed   Resp Comments pt transfered to MS2 HFNC settings increased due to desaturations     Non-Invasive Settings   FiO2 (%) 90   Flow (lpm) 60   Temperature (Set) 31   Non-Invasive Readings   Heater Temperature (Obs) 31   Skin Intervention Skin intact

## 2020-12-22 NOTE — ASSESSMENT & PLAN NOTE
Completed treatment under moderate pathway; now requiring   - Oxygen:Continue   - Vitamin C/Zinc/Statin: x 7 days  Complete  - Steroids Completed 10 day of dexamethasone 6 mg daily  - Convalescent Plasma: 12/11  - Remdesivir: x 4 complete  12/10-12/14  - Actemra: Will check inflammatory markers, procalcitonin  Although improving Actemra may be considered given his tenuous status    - Anticoagulation:  Eliquis 5 mg b i d   - Encourage self proning/aggressive pulmonary toilet  -goals of care discussion today

## 2020-12-22 NOTE — PROGRESS NOTES
Progress Note GuiBizporas File 1950, 65 y o  male MRN: [id number]    Unit/Bed#:  Encounter: [id number]    Primary Care Provider: Deidre Kilpatrick MD   Date and time admitted to hospital: 12/10/2020 12:59 PM    * COVID-19 virus infection  Assessment & Plan  Completed treatment under moderate pathway; now requiring   - Oxygen:Continue   - Vitamin C/Zinc/Statin: x 7 days  Complete  - Steroids Completed 10 day of dexamethasone 6 mg daily  - Convalescent Plasma: 12/11  - Remdesivir: x 4 complete  12/10-12/14  - Actemra: Will check inflammatory markers, procalcitonin  Although improving Actemra may be considered given his tenuous status  - Anticoagulation:  Eliquis 5 mg b i d   - Encourage self proning/aggressive pulmonary toilet  -goals of care discussion today       Acute respiratory failure with hypoxia ([facility] [address] )  Assessment & Plan  · Secondary to COVID-19 infection  Bacterial super infection less likely secondary to progressive nature of respiratory failure  · Plan as noted above  · Will hold further steroid therapy  · May consider Actemra if remains tenuous  Check procalcitonin  Trend inflammatory markers  · Will observe off antibiotics at this time  · Encourage good incentive spirometry/pulmonary toilet  · Goals of care discussion today       Acute metabolic encephalopathy  Assessment & Plan  · Multifactorial in setting of baseline cognitive dysfunction, prolonged hospitalization and hypoxemia  · CT head 12/18-negative for acute intracranial abnormality/hemorrhage  · Frequent orientation  Regulate sleep/wake cycles  · Daily CAM ICU    New onset a-fib [facility])  Assessment & Plan  · Appreciate cardiology consultation/recommendations  · Metoprolol 12 5 mg b i d  · Cardizem 24 hour capsule 120 mg daily  · Continue Eliquis 5 mg b i d  For systemic anticoagulation; chads Vasc 4   Given recent fall resulting in subdural would suggest to discuss risk v benefit of long-term anticoagulation  · Currently in sinus rhythm    Positive blood culture  Assessment & Plan  · Suspect contaminant in setting of [1/2 blood culture] Staph aureus coagulase negative     Right Subdural hematoma ([facility] [address] ) 2020  Assessment & Plan  · Suspected traumatic subdural hematoma 2020 in setting of traumatic fall  · Left upper extremity residual weakness  · Given requirements for systemic anticoagulation case reviewed with Neurosurgery clearing patient for anticoagulant    CVA (cerebral vascular accident) [facility])  Assessment & Plan  · Hemorrhagic CVA   · Left upper extremity residual deficits  Essential hypertension  Assessment & Plan  · Continue metoprolol 12 5 mg b i d   · Diltiazem 24 hr capsule 120 mg daily    COPD (chronic obstructive pulmonary disease) ([facility] [address] )  Assessment & Plan  · Not in acute exacerbation  · Bronchodilators as needed    Other hyperlipidemia  Assessment & Plan  · Continue Lipitor 40 mg daily  Hyperglycemia  Assessment & Plan  · Off insulin  · Suspect hyperglycemia was related to high-dose steroids, since discontinued      ----------------------------------------------------------------------------------------  HPI/24hr events:    Worsening hypoxia throughout the day  Disposition: Continue Stepdown Level 1 level of care   Code Status: Level 3 - DNAR and DNI  ---------------------------------------------------------------------------------------      Review of Systems  Review of systems was reviewed and negative unless stated above in HPI/24-hour events   ---------------------------------------------------------------------------------------  OBJECTIVE    Vitals   Vitals:    20 1844 20 0030 20 0035   BP: 110/61 101/57 101/64    BP Location: Right arm  Right arm    Pulse: 79 80 68    Resp:     Temp: 98 9 °F (37 2 °C)  98 6 °F (37 °C)    TempSrc: Oral  Oral    SpO2: (!) 88% 92% (!) 88% (!) 87%   Weight:       Height:         Temp (24hrs), Av 2 °F (37 3 °C), Min:98 2 °F (36 8 °C), Max:101 4 °F (38 6 °C)  Current: Temperature: 98 6 °F (37 °C)          Respiratory:  SpO2: SpO2: (!) 87 %    Nasal Cannula O2 Flow Rate (L/min): 15 L/min    Invasive/non-invasive ventilation settings   Respiratory    Lab Data (Last 4 hours)    None         O2/Vent Data (Last 4 hours)      12/22 0035          Non-Invasive Ventilation Mode HFNC (High flow)                   Physical Exam  Constitutional:       Appearance: He is ill-appearing  Interventions: Nasal cannula in place  Comments: High flow nasal cannula    HENT:      Head: Normocephalic and atraumatic  Eyes:      General: Lids are normal       Extraocular Movements: Extraocular movements intact  Neck:      Musculoskeletal: Full passive range of motion without pain  Trachea: Trachea normal    Cardiovascular:      Rate and Rhythm: Normal rate and regular rhythm  Pulmonary:      Breath sounds: Decreased breath sounds present  Abdominal:      Palpations: Abdomen is soft  Musculoskeletal:      Right lower leg: No edema  Left lower leg: No edema  Comments: Historical residual left sided weakness    Skin:     General: Skin is warm and dry  Neurological:      GCS: GCS eye subscore is 4  GCS verbal subscore is 4  GCS motor subscore is 6         Laboratory and Diagnostics:  Results from last 7 days   Lab Units 12/21/20  0540 12/20/20  0507 12/19/20  0452 12/16/20  0543 12/15/20  0440   WBC Thousand/uL 13 11* 12 23* 9 93 5 35 6 60   HEMOGLOBIN g/dL 12 0 12 3 12 3 11 0* 11 3*   HEMATOCRIT % 35 4* 36 1* 36 5 33 1* 33 2*   PLATELETS Thousands/uL 179 210 213 201 197   BANDS PCT %  --  1  --   --   --    MONO PCT %  --  3*  --   --   --      Results from last 7 days   Lab Units 12/21/20  0540 12/20/20  0507 12/18/20  0838 12/16/20  0543 12/15/20  0440   SODIUM mmol/L 137 137 138 134* 137   POTASSIUM mmol/L 5 2 5 5* 5 1 4 7 4 7   CHLORIDE mmol/L 104 105 104 101 104   CO2 mmol/L 24 24 28 22 22   ANION GAP mmol/L 9 8 6 11 11 BUN mg/dL 53* 58* 38* 37* 29*   CREATININE mg/dL 0 98 1 11 1 11 1 04 1 02   CALCIUM mg/dL 8 9 8 8 8 8 8 4 8 7   GLUCOSE RANDOM mg/dL 134 177* 271* 389* 262*   ALT U/L 86* 66  --  41 45   AST U/L 51* 35  --  24 32   ALK PHOS U/L 94 104  --  119* 121*   ALBUMIN g/dL 2 2* 2 3*  --  2 2* 2 3*   TOTAL BILIRUBIN mg/dL 0 70 0 60  --  0 30 0 40     Results from last 7 days   Lab Units 12/21/20  0540 12/20/20  0507 12/18/20  0838   MAGNESIUM mg/dL 2 3 2 4 2 2   PHOSPHORUS mg/dL 3 8 5 2*  --                    ABG:  Results from last 7 days   Lab Units 12/20/20  0630   PH ART  7 422   PCO2 ART mm Hg 34 9*   PO2 ART mm Hg 84 7   HCO3 ART mmol/L 22 2   BASE EXC ART mmol/L -1 6   ABG SOURCE  Radial, Right     VBG:  Results from last 7 days   Lab Units 12/20/20  0630   ABG SOURCE  Radial, Right     Results from last 7 days   Lab Units 12/20/20  0507 12/19/20  1349 12/15/20  0440   PROCALCITONIN ng/ml <0 05 <0 05 <0 05       Micro        EKG: NSR   Imaging:   XR chest portable ICU   Final Result      No change in right greater than left ground glass opacity due to Covid 19 pneumonia  Workstation performed: [id number]         CT head wo contrast   Final Result         1  Advanced, chronic microangiopathy and chronic right occipital lobe infarction are stable  2   No acute intracranial hemorrhage, mass effect or edema  Workstation performed: [id number]         XR chest portable   Final Result      No change in right greater than left ground glass opacity due to Covid-19 pneumonia  Workstation performed: [id number]         CT head wo contrast   Final Result         1  No acute intracranial hemorrhage, mass effect or edema  2   Advanced, chronic microangiopathy     3   Moderate-sized, chronic appearing infarction in the right posterior cerebral artery vascular territory                  Workstation performed: [id number]         XR chest portable   Final Result   There are areas of groundglass density in the both lungs with peripheral predominance with associated organizing consolidation in the right midlung and right perihilar region  ,  Commonly reported imaging features of Covid 19 pneumonia are    present  Superimposed bacterial pneumonia is not entirely excluded  Other less likely differential consideration could include influenza pneumonia and organizing pneumonia       Correlate clinically      Follow-up in 6-8 weeks to demonstrate resolution       I personally discussed this study with Dr Gonzalo Muhammad, on 12/10/2020 at 3:00 PM                Workstation performed: [id number]         XR chest portable ICU    (Results Pending)       Intake and Output  I/O       12/20 0701 - 12/21 0700 12/21 0701 - 12/22 0700    P  O  840 1520    I V  (mL/kg) 95 8 (1 7) 23 1 (0 4)    Total Intake(mL/kg) 935 8 (17) 1543 1 (28)    Urine (mL/kg/hr) 1025 (0 8) 125 (0 1)    Stool  0    Total Output 1025 125    Net -89 3 +1418 1          Unmeasured Urine Occurrence  2 x    Unmeasured Stool Occurrence  1 x        Height and Weights   Height: 5' 4" (162 6 cm)  IBW: 59 2 kg  Body mass index is 20 89 kg/m²  Weight (last 2 days)     Date/Time   Weight    12/21/20 0541   55 2 (121 69)    12/20/20 0600   54 3 (119 71)            Nutrition       Diet Orders   (From admission, onward)             Start     Ordered    12/21/20 1315  Diet Regular; Regular House; Dysphagia 3-Dental Soft; Thin Liquid  Diet effective now     Question Answer Comment   Diet Type Regular    Regular Regular House    Other Restriction(s): Dysphagia 3-Dental Soft    Liquid Modifier Thin Liquid    RD to adjust diet per protocol?  Yes        12/21/20 1314    12/18/20 1455  Dietary nutrition supplements  Once     Question Answer Comment   Select Supplement: Glucerna-Vanilla    Frequency Breakfast, Dinner        12/18/20 1458              Active Medications  Scheduled Meds:  Current Facility-Administered Medications   Medication Dose Route Frequency Provider Last Rate    acetaminophen  650 mg Oral Q6H PRN Franklin Ortiz PA-C      apixaban  5 mg Oral BID Franklin Ortiz PA-C      atorvastatin  40 mg Oral HS Franklin Ortiz PA-C      bisacodyl  10 mg Rectal Daily Franklin Ortiz PA-C      cefepime  2,000 mg Intravenous Q12H CHARITO TongNP 2,000 mg (12/21/20 1824)    cholecalciferol  2,000 Units Oral Daily Franklin Ortiz PA-C      diltiazem  120 mg Oral Daily Franklin Diana, [place]      famotidine  20 mg Oral Daily Franklin Diana, [place]      gabapentin  600 mg Oral TID Franklin Ortiz PA-C      insulin regular (HumuLIN R,NovoLIN R) infusion  0 3-21 Units/hr Intravenous Titrated Franklin Ortiz PA-C Stopped (12/22/20 0255)    magnesium hydroxide  30 mL Oral Daily PRN Franklin Ortiz PA-C      melatonin  3 mg Oral HS Franklin Ortiz PA-C      metoprolol  2 5 mg Intravenous Q6H PRN Franklin Ortiz PA-C      metoprolol tartrate  12 5 mg Oral Q12H [facility] [place]      multivitamin-minerals  1 tablet Oral Daily Franklin Diana, [place]      nicotine  7 mg Transdermal Daily ZULMA Silva      ondansetron  4 mg Intravenous Q6H PRN Franklin Ortiz PA-C      phenol  1 spray Mouth/Throat Q2H PRN Franklin Ortiz PA-C      senna-docusate sodium  2 tablet Oral BID ZULMA Silva      traZODone  25 mg Oral HS Franklin Ortiz [place]      vancomycin  12 5 mg/kg Intravenous Q12H ZULMA Tong 750 mg (12/21/20 2049)     Continuous Infusions:  insulin regular (HumuLIN R,NovoLIN R) infusion, 0 3-21 Units/hr, Last Rate: Stopped (12/22/20 0255)      PRN Meds:   acetaminophen, 650 mg, Q6H PRN  magnesium hydroxide, 30 mL, Daily PRN  metoprolol, 2 5 mg, Q6H PRN  ondansetron, 4 mg, Q6H PRN  phenol, 1 spray, Q2H PRN        Invasive Devices Review  Invasive Devices     Peripheral Intravenous Line            Peripheral IV 12/21/20 Right Forearm less than 1 day                Rationale for remaining devices: Na ---------------------------------------------------------------------------------------  Advance Directive and Living Will:      Power of :    POLST:    ---------------------------------------------------------------------------------------  Care Time Delivered:   No Critical Care time spent       ZULMA Matson      Portions of the record may have been created with voice recognition software  Occasional wrong word or "sound a like" substitutions may have occurred due to the inherent limitations of voice recognition software    Read the chart carefully and recognize, using context, where substitutions have occurred

## 2020-12-22 NOTE — SOCIAL WORK
A family meeting via phone was necessary to allow for thorough discussion regarding patient's clinical presentation, expected disease trajectory, and comfort care versus continuing disease directed therapy  Please refer to [facility] provider note for medical specifics  Questions related to medical diagnoses, comfort care and prognosis were answered and all agree:     Attended  Will Mccray Provider  Dr Reina Stewart Provider  Maxx Jolly The niece, Yadiel Cooperars has decided on comfort care level of care  Yadiel Cobden stated that she doesn't want to prolong his life and have him suffer   Another conversation related to patient's goals will need to be held as patient's condition changes or requested by family

## 2020-12-22 NOTE — CONSULTS
Consultation - Palliative and Supportive Care   Rosa Mercedes 65 y o  male [id number]    Assessment:      Patient Active Problem List   Diagnosis    Other hyperlipidemia    Other insomnia    Essential hypertension    Slow transit constipation    Peripheral neuropathy    DVT prophylaxis    COVID-19 virus infection    CVA (cerebral vascular accident) ([facility] [address] )    [facility]    Acute respiratory failure with hypoxia (HCC)    Acute metabolic encephalopathy    New onset a-fib (HCC)    Positive blood culture    Hyperglycemia    Right Subdural hematoma ([facility] ) 09/2020           Plan:  1  Symptom management -    - Will add comfort meds PRN    - morphine 2mg IV q30min PRN for pain/SOB    - ativan 0 5mg q2h PRN for anxiety    - Discontinued all oral medications non-essential for comfort/quality  If patient consistently unable to take PO would d/c the remainder of these medications  2  Goals - Spoke with patient's niece/designated medical decision maker  She express poor quality of life at baseline with poor functional status since CVAs over the summer  Provided medical update  At this point niece expresses family goals of focusing on patient's comfort  Patient will be medicated for dyspnea  If patient becomes poorly responsive would deescalate O2 to 2L via NC  Would then officially transition to level 4 code status  Will place hospice consult for consideration of inpatient hospice vs  GIP in hospital  No spiritual needs identified  Code Status: level 3   Decisional apparatus:  Patient is not competent on my exam today  If competence is lost, patient's substitute decision maker would default to siblings (sister defers to niece) by [place] 169  I have reviewed the patient's controlled substance dispensing history in the Prescription Drug Monitoring Program in compliance with the [facility] regulations before prescribing any controlled substances           We appreciate the invitation to be involved in this patient's care  We will continue to follow  Please do not hesitate to reach our on call provider through our clinic answering service at  should you have acute symptom control concerns  Adolfo Calle MD  Palliative and Supportive Care  Clinic/Answering Service: [phone]  You can find me on [employer]! IDENTIFICATION:  Inpatient consult to Palliative Care  Consult performed by: Adolfo Calle MD  Consult ordered by: ZULMA Barger        Physician Requesting Consult: David Briggs DO  Reason for Consult / Principal Problem: goals of care  Hx and PE limited by: patient dementia/encephalopathy    HISTORY OF PRESENT ILLNESS:       Georgia Rinaldi is a 65 y o  male with history of previous CVA with baseline debility who has been mostly residing in SNFs  [facility] presents with shortness of breath and hypoxia as well as AMS  Diagnosed with COVID pneumonia on 12/10  Unfortuantely, mentation has waxed and waned, and respiration has slowly contineud to worsen  Patient has been escalated to high settings on high Flow and still has episodic desaturations  [facility] consulted to assist in goals of care  Conversaiton as above  Review of Systems   Unable to perform ROS: dementia       Past Medical History:   Diagnosis Date    Abnormality of gait and mobility     JOSHUA (acute kidney injury) ([facility] [address] ) 12/11/2020    GERD (gastroesophageal reflux disease)     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     Other mechanical complication of other specified internal prosthetic devices, implants and grafts, sequela     Recurrent depressive disorder ([facility] [address] )     Wedge compression fracture of t11-T12 vertebra, initial encounter for closed fracture ([facility] [address] )     Wheezing      No past surgical history on file    Social History     Socioeconomic History    Marital status: Single     Spouse name: Not on file    Number of children: Not on file    Years of education: Not on file    Highest education level: Not on file   Occupational History    Not on file   Social Needs    Financial resource strain: Not on file    Food insecurity     Worry: Not on file     Inability: Not on file    Transportation needs     Medical: Not on file     Non-medical: Not on file   Tobacco Use    Smoking status: Current Every Day Smoker   Substance and Sexual Activity    Alcohol use: Yes    Drug use: Yes     Comment: pt states "I take whatever I get a hold of"    Sexual activity: Not on file   Lifestyle    Physical activity     Days per week: Not on file     Minutes per session: Not on file    Stress: Not on file   Relationships    Social connections     Talks on phone: Not on file     Gets together: Not on file     Attends Orthodoxy service: Not on file     Active member of club or organization: Not on file     Attends meetings of clubs or organizations: Not on file     Relationship status: Not on file    Intimate partner violence     Fear of current or ex partner: Not on file     Emotionally abused: Not on file     Physically abused: Not on file     Forced sexual activity: Not on file   Other Topics Concern    Not on file   Social History Narrative    Not on file     No family history on file      MEDICATIONS / ALLERGIES:    all current active meds have been reviewed and current meds:   Current Facility-Administered Medications   Medication Dose Route Frequency    acetaminophen (TYLENOL) tablet 650 mg  650 mg Oral Q6H PRN    bisacodyl (DULCOLAX) rectal suppository 10 mg  10 mg Rectal Daily    diltiazem (CARDIZEM CD) 24 hr capsule 120 mg  120 mg Oral Daily    famotidine (PEPCID) tablet 20 mg  20 mg Oral Daily    gabapentin (NEURONTIN) capsule 600 mg  600 mg Oral TID    LORazepam (ATIVAN) injection 0 5 mg  0 5 mg Intravenous Q2H PRN    magnesium hydroxide (MILK OF MAGNESIA) oral suspension 30 mL  30 mL Oral Daily PRN    melatonin tablet 3 mg  3 mg Oral HS    metoprolol (LOPRESSOR) injection 2 5 mg  2 5 mg Intravenous Q6H PRN    metoprolol tartrate (LOPRESSOR) partial tablet 12 5 mg  12 5 mg Oral Q12H [address]    morphine injection 2 mg  2 mg Intravenous Q30 Min PRN    nicotine (NICODERM CQ) 7 mg/24hr TD 24 hr patch 7 mg  7 mg Transdermal Daily    ondansetron (ZOFRAN) injection 4 mg  4 mg Intravenous Q6H PRN    phenol (CHLORASEPTIC) 1 4 % mucosal liquid 1 spray  1 spray Mouth/Throat Q2H PRN    traZODone (DESYREL) tablet 25 mg  25 mg Oral HS       No Known Allergies    OBJECTIVE:    Physical Exam  Physical Exam  Vitals signs and nursing note reviewed  Constitutional:       General: He is not in acute distress  Appearance: He is ill-appearing and toxic-appearing  HENT:      Head: Atraumatic  Nose:      Comments: HFNC in place  Eyes:      General:         Right eye: No discharge  Left eye: No discharge  Cardiovascular:      Rate and Rhythm: Normal rate  Pulmonary:      Effort: Respiratory distress (mildly labored) present  Abdominal:      General: There is no distension  Musculoskeletal:         General: No deformity  Skin:     General: Skin is dry  Neurological:      Mental Status: He is alert  Comments: Eyes open, nursing reports intermittently interactive      - performed through window with help of nursing 2/2 COVID    Lab Results:   I have personally reviewed pertinent labs  , CBC:   Lab Results   Component Value Date    WBC 11 05 (H) 12/22/2020    HGB 11 1 (L) 12/22/2020    HCT 32 5 (L) 12/22/2020    MCV 89 12/22/2020     (L) 12/22/2020    MCH 30 5 12/22/2020    MCHC 34 2 12/22/2020    RDW 17 1 (H) 12/22/2020    MPV 11 2 12/22/2020    NRBC 0 12/22/2020   , CMP:   Lab Results   Component Value Date    SODIUM 135 (L) 12/22/2020    K 4 9 12/22/2020     12/22/2020    CO2 24 12/22/2020    BUN 46 (H) 12/22/2020    CREATININE 0 91 12/22/2020    CALCIUM 8 9 12/22/2020    AST 50 (H) 12/22/2020    ALT 93 (H) 12/22/2020 ALKPHOS 98 12/22/2020    EGFR 84 12/22/2020   , BMP:  Lab Results   Component Value Date    SODIUM 135 (L) 12/22/2020    K 4 9 12/22/2020     12/22/2020    CO2 24 12/22/2020    BUN 46 (H) 12/22/2020    CREATININE 0 91 12/22/2020    GLUC 121 12/22/2020    CALCIUM 8 9 12/22/2020    AGAP 9 12/22/2020    EGFR 84 12/22/2020     Imaging Studies: I personally reviewed relevant reports  EKG, Pathology, and Other Studies: I personally reviewed relevant reports    Counseling / Coordination of Care    Total floor / unit time spent today 55+ minutes  Greater than 50% of total time was spent with the patient and / or family counseling and / or coordination of care (11:30-12:00)  A description of the counseling / coordination of care: chart review, med review, comfort measures, med management with controlled substnaces, code status, goals of care

## 2020-12-23 PROCEDURE — 99232 SBSQ HOSP IP/OBS MODERATE 35: CPT | Performed by: PHYSICIAN ASSISTANT

## 2020-12-23 RX ADMIN — MORPHINE SULFATE 2 MG: 2 INJECTION, SOLUTION INTRAMUSCULAR; INTRAVENOUS at 21:12

## 2020-12-23 RX ADMIN — MORPHINE SULFATE 2 MG: 2 INJECTION, SOLUTION INTRAMUSCULAR; INTRAVENOUS at 09:43

## 2020-12-23 RX ADMIN — MORPHINE SULFATE 2 MG: 2 INJECTION, SOLUTION INTRAMUSCULAR; INTRAVENOUS at 16:05

## 2020-12-23 NOTE — ASSESSMENT & PLAN NOTE
· Likely secondary to COVID-19 infection and severe hypoxia  · Currently mentation is better, will continue to monitor and support as needed

## 2020-12-23 NOTE — PROGRESS NOTES
Patient is awake and alert when woken up  He is refusing comfort medications  Patient is resting comfortably in bed

## 2020-12-23 NOTE — ASSESSMENT & PLAN NOTE
· Pt with recent hx of stroke a few months ago followed by [facility] thought to be traumatic in setting of falls at home   · Anticoagulation discontinued  · Comfort measures only

## 2020-12-23 NOTE — HOSPICE NOTE
RECEIVED REFERRAL THIS AM  CURRENTLY WE HAVE NO OPEN BEDS IN THE HOSPICE HOUSE  SPOKE WITH DR Dorina Quigley ABOUT DOING GIP AT [facility] [place] [place]  HE STATED THAT PT WOULD NEED TO COME OFF HIGH FLOW AND THEN REQUIRE MORE AGGRESSIVE SX MANAGEMENT  IF HE DOES COME OFF HF AND IS ABLE TO TAKE ORAL  MORPIHNE AND ATIVAN HE COULD RETURN TO SNF   UPDATED DR Tigist Simmons BY TT AND LEFT MESSAGE FOR CM TO CALL ME

## 2020-12-23 NOTE — PLAN OF CARE
Problem: Potential for Falls  Goal: Patient will remain free of falls  Description: INTERVENTIONS:  - Assess patient frequently for physical needs  -  Identify cognitive and physical deficits and behaviors that affect risk of falls    -  [place] fall precautions as indicated by assessment   - Educate patient/family on patient safety including physical limitations  - Instruct patient to call for assistance with activity based on assessment  - Modify environment to reduce risk of injury  - Consider OT/PT consult to assist with strengthening/mobility  Outcome: Progressing     Problem: PAIN - ADULT  Goal: Verbalizes/displays adequate comfort level or baseline comfort level  Description: Interventions:  - Encourage patient to monitor pain and request assistance  - Assess pain using appropriate pain scale  - Administer analgesics based on type and severity of pain and evaluate response  - Implement non-pharmacological measures as appropriate and evaluate response  - Consider cultural and social influences on pain and pain management  - Notify physician/advanced practitioner if interventions unsuccessful or patient reports new pain  Outcome: Progressing     Problem: INFECTION - ADULT  Goal: Absence or prevention of progression during hospitalization  Description: INTERVENTIONS:  - Assess and monitor for signs and symptoms of infection  - Monitor lab/diagnostic results  - Monitor all insertion sites, i e  indwelling lines, tubes, and drains  - Monitor endotracheal if appropriate and nasal secretions for changes in amount and color  - [place] appropriate cooling/warming therapies per order  - Administer medications as ordered  - Instruct and encourage patient and family to use good hand hygiene technique  - Identify and instruct in appropriate isolation precautions for identified infection/condition  Outcome: Progressing  Goal: Absence of fever/infection during neutropenic period  Description: INTERVENTIONS:  - Monitor WBC    Outcome: Progressing     Problem: SAFETY ADULT  Goal: Patient will remain free of falls  Description: INTERVENTIONS:  - Assess patient frequently for physical needs  -  Identify cognitive and physical deficits and behaviors that affect risk of falls    -  [place] fall precautions as indicated by assessment   - Educate patient/family on patient safety including physical limitations  - Instruct patient to call for assistance with activity based on assessment  - Modify environment to reduce risk of injury  - Consider OT/PT consult to assist with strengthening/mobility  Outcome: Progressing  Goal: Maintain or return to baseline ADL function  Description: INTERVENTIONS:  -  Assess patient's ability to carry out ADLs; assess patient's baseline for ADL function and identify physical deficits which impact ability to perform ADLs (bathing, care of mouth/teeth, toileting, grooming, dressing, etc )  - Assess/evaluate cause of self-care deficits   - Assess range of motion  - Assess patient's mobility; develop plan if impaired  - Assess patient's need for assistive devices and provide as appropriate  - Encourage maximum independence but intervene and supervise when necessary  - Involve family in performance of ADLs  - Assess for home care needs following discharge   - Consider OT consult to assist with ADL evaluation and planning for discharge  - Provide patient education as appropriate  Outcome: Progressing  Goal: Maintain or return mobility status to optimal level  Description: INTERVENTIONS:  - Assess patient's baseline mobility status (ambulation, transfers, stairs, etc )    - Identify cognitive and physical deficits and behaviors that affect mobility  - Identify mobility aids required to assist with transfers and/or ambulation (gait belt, sit-to-stand, lift, walker, cane, etc )  - [place] fall precautions as indicated by assessment  - Record patient progress and toleration of activity level on Mobility SBAR; progress patient to next Phase/Stage  - Instruct patient to call for assistance with activity based on assessment  - Consider rehabilitation consult to assist with strengthening/weightbearing, etc   Outcome: Progressing     Problem: DISCHARGE PLANNING  Goal: Discharge to home or other facility with appropriate resources  Description: INTERVENTIONS:  - Identify barriers to discharge w/patient and caregiver  - Arrange for needed discharge resources and transportation as appropriate  - Identify discharge learning needs (meds, wound care, etc )  - Arrange for interpretive services to assist at discharge as needed  - Refer to Case Management Department for coordinating discharge planning if the patient needs post-hospital services based on physician/advanced practitioner order or complex needs related to functional status, cognitive ability, or social support system  Outcome: Progressing     Problem: Knowledge Deficit  Goal: Patient/family/caregiver demonstrates understanding of disease process, treatment plan, medications, and discharge instructions  Description: Complete learning assessment and assess knowledge base    Interventions:  - Provide teaching at level of understanding  - Provide teaching via preferred learning methods  Outcome: Progressing     Problem: Prexisting or High Potential for Compromised Skin Integrity  Goal: Skin integrity is maintained or improved  Description: INTERVENTIONS:  - Identify patients at risk for skin breakdown  - Assess and monitor skin integrity  - Assess and monitor nutrition and hydration status  - Monitor labs   - Assess for incontinence   - Turn and reposition patient  - Assist with mobility/ambulation  - Relieve pressure over bony prominences  - Avoid friction and shearing  - Provide appropriate hygiene as needed including keeping skin clean and dry  - Evaluate need for skin moisturizer/barrier cream  - Collaborate with interdisciplinary team   - Patient/family teaching  - Consider wound care consult   Outcome: Progressing     Problem: Nutrition/Hydration-ADULT  Goal: Nutrient/Hydration intake appropriate for improving, restoring or maintaining nutritional needs  Description: Monitor and assess patient's nutrition/hydration status for malnutrition  Collaborate with interdisciplinary team and initiate plan and interventions as ordered  Monitor patient's weight and dietary intake as ordered or per policy  Utilize nutrition screening tool and intervene as necessary  Determine patient's food preferences and provide high-protein, high-caloric foods as appropriate       INTERVENTIONS:  - Monitor oral intake, urinary output, labs, and treatment plans  - Assess nutrition and hydration status and recommend course of action  - Evaluate amount of meals eaten  - Assist patient with eating if necessary   - Allow adequate time for meals  - Recommend/ encourage appropriate diets, oral nutritional supplements, and vitamin/mineral supplements  - Order, calculate, and assess calorie counts as needed  - Recommend, monitor, and adjust tube feedings and TPN/PPN based on assessed needs  - Assess need for intravenous fluids  - Provide specific nutrition/hydration education as appropriate  - Include patient/family/caregiver in decisions related to nutrition  Outcome: Progressing     Problem: RESPIRATORY - ADULT  Goal: Achieves optimal ventilation and oxygenation  Description: INTERVENTIONS:  - Assess for changes in respiratory status  - Assess for changes in mentation and behavior  - Position to facilitate oxygenation and minimize respiratory effort  - Oxygen administered by appropriate delivery if ordered  - Initiate smoking cessation education as indicated  - Encourage broncho-pulmonary hygiene including cough, deep breathe, Incentive Spirometry  - Assess the need for suctioning and aspirate as needed  - Assess and instruct to report SOB or any respiratory difficulty  - Respiratory Therapy support as indicated  Outcome: Progressing     Problem: GENITOURINARY - ADULT  Goal: Maintains or returns to baseline urinary function  Description: INTERVENTIONS:  - Assess urinary function  - Encourage oral fluids to ensure adequate hydration if ordered  - Administer IV fluids as ordered to ensure adequate hydration  - Administer ordered medications as needed  - Offer frequent toileting  - Follow urinary retention protocol if ordered  Outcome: Progressing  Goal: Absence of urinary retention  Description: INTERVENTIONS:  - Assess patients ability to void and empty bladder  - Monitor I/O  - Bladder scan as needed  - Discuss with physician/AP medications to alleviate retention as needed  - Discuss catheterization for long term situations as appropriate  Outcome: Progressing     Problem: METABOLIC, FLUID AND ELECTROLYTES - ADULT  Goal: Electrolytes maintained within normal limits  Description: INTERVENTIONS:  - Monitor labs and assess patient for signs and symptoms of electrolyte imbalances  - Administer electrolyte replacement as ordered  - Monitor response to electrolyte replacements, including repeat lab results as appropriate  - Instruct patient on fluid and nutrition as appropriate  Outcome: Progressing  Goal: Fluid balance maintained  Description: INTERVENTIONS:  - Monitor labs   - Monitor I/O and WT  - Instruct patient on fluid and nutrition as appropriate  - Assess for signs & symptoms of volume excess or deficit  Outcome: Progressing  Goal: Glucose maintained within target range  Description: INTERVENTIONS:  - Monitor Blood Glucose as ordered  - Assess for signs and symptoms of hyperglycemia and hypoglycemia  - Administer ordered medications to maintain glucose within target range  - Assess nutritional intake and initiate nutrition service referral as needed  Outcome: Progressing     Problem: SKIN/TISSUE INTEGRITY - ADULT  Goal: Skin integrity remains intact  Description: INTERVENTIONS  - Identify patients at risk for skin breakdown  - Assess and monitor skin integrity  - Assess and monitor nutrition and hydration status  - Monitor labs (i e  albumin)  - Assess for incontinence   - Turn and reposition patient  - Assist with mobility/ambulation  - Relieve pressure over bony prominences  - Avoid friction and shearing  - Provide appropriate hygiene as needed including keeping skin clean and dry  - Evaluate need for skin moisturizer/barrier cream  - Collaborate with interdisciplinary team (i e  Nutrition, Rehabilitation, etc )   - Patient/family teaching  Outcome: Progressing

## 2020-12-23 NOTE — ASSESSMENT & PLAN NOTE
· Pt presented with generalized weakness and increased confusion, required mid flow on high-flow and was transferred to the ICU  He does not want intubation, and a conversation was held with his family regarding goals of care  They wish to make him comfort, he has been transferred back out of the ICU to medical-surgical floor    · Amazingly, the patient is currently 63% oxygen saturation on room air and mentating at baseline  · Patient is now comfort care, transition to hospice either inpatient versus SNF with hospice services  · All unnecessary medications have been discontinued  · Appreciate palliative care

## 2020-12-23 NOTE — CASE MANAGEMENT
Per palliative doctor Dr Juarez Ar patient will not be stable for travel however can qualify for hospice in the hospital   Referral to [facility] has been made to address consult  Treatment team informed

## 2020-12-23 NOTE — CASE MANAGEMENT
Per rounding with [facility], patient and family are agreeable to SNF placement with hospice services  Referral sent to [address] [place] [address]  for home hospice  Referrals pended to SNFs to determine bed availability

## 2020-12-23 NOTE — PROGRESS NOTES
Progress Note Nicola Vasquez 1950, 65 y o  male   MRN: [id number]    Unit/Bed#: -01 Encounter: [id number]    Primary Care Provider: Jose Leary MD   Date and time admitted to hospital: 12/10/2020 12:59 PM    * COVID-19 virus infection  Assessment & Plan  · Pt presented with generalized weakness and increased confusion, required mid flow on high-flow and was transferred to the ICU  He does not want intubation, and a conversation was held with his family regarding goals of care  They wish to make him comfort, he has been transferred back out of the ICU to medical-surgical floor    · Amazingly, the patient is currently 63% oxygen saturation on room air and mentating at baseline  · Patient is now comfort care, transition to hospice either inpatient versus SNF with hospice services  · All unnecessary medications have been discontinued  · Appreciate palliative care    COPD (chronic obstructive pulmonary disease) ([facility] [address] )  Assessment & Plan  · Not typically maintained on supplemental O2 as an outpatient     Acute respiratory failure with hypoxia ([address] )  Assessment & Plan  · Remains hypoxic into the 60s on room air  · Support as needed    CVA (cerebral vascular accident) ([address] )  Assessment & Plan  · Pt with recent hx of stroke a few months ago followed by [facility] thought to be traumatic in setting of falls at home   · Anticoagulation discontinued  · Comfort measures only    Acute metabolic encephalopathy  Assessment & Plan  · Likely secondary to COVID-19 infection and severe hypoxia  · Currently mentation is better, will continue to monitor and support as needed    New onset a-fib ([address] )  Assessment & Plan  · Pt noted to have fluctuations, new onset afib    Positive blood culture  Assessment & Plan  · 1/2 BC (+) for staph coag negative contaminant    Hyperglycemia  Assessment & Plan  · A1c slightly elevated 6 5%  · No longer monitoring on comfort care    Essential hypertension  Assessment & Plan  · Noted to have hypotension on admission, now stable  · Significantly improved, stable  · Continue routine vital signs      VTE Pharmacologic Prophylaxis:   Pharmacologic: not indicated, comfort  Mechanical VTE Prophylaxis in Place:     Patient Centered Rounds: I have evaluated patient without nursing staff present due to d/w nurse privately    Discussions with Specialists or Other Care Team Provider: CM, palliative     Education and Discussions with Family / Patient: patient; updated niece by phone -- she will call and talk to the patient to decide if he still wants to move forward with hospice    Time Spent for Care: 20 minutes  More than 50% of total time spent on counseling and coordination of care as described above  Current Length of Stay: 13 day(s)    Current Patient Status: Inpatient   Certification Statement: The patient will continue to require additional inpatient hospital stay due to hospice    Discharge Plan: hospice    Code Status: Level 4 - Comfort Care      Subjective:   Patient seen through his window, spoken to by phone  He states he feels well  Denies any shortness of breath  He does not know where he is would identify the hospital on prompting  He does not know why he is here and will be discussed that he has COVID-19 he does not remember that  He denies any headache or shortness of breath  He reports he is very tired, and just wants something to make him sleep for a long time  Objective:     Vitals:   No data recorded  SpO2:  [86 %] 86 %  Body mass index is 20 89 kg/m²  Input and Output Summary (last 24 hours): Intake/Output Summary (Last 24 hours) at 12/23/2020 1447  Last data filed at 12/22/2020 1900  Gross per 24 hour   Intake 60 ml   Output 400 ml   Net -340 ml       Physical Exam:     Physical Exam  Constitutional:       Appearance: He is ill-appearing (chronic)  He is not toxic-appearing        Comments: Frail appearing   Pulmonary:      Effort: Pulmonary effort is normal  Comments: He does have some belly breathing, but does not appear to be overtly distressed  Neurological:      Mental Status: He is alert  Additional Data:     Labs:    Results from last 7 days   Lab Units 12/22/20  0551  12/20/20  0507   WBC Thousand/uL 11 05*   < > 12 23*   HEMOGLOBIN g/dL 11 1*   < > 12 3   HEMATOCRIT % 32 5*   < > 36 1*   PLATELETS Thousands/uL 144*   < > 210   BANDS PCT %  --   --  1   LYMPHO PCT %  --   --  3*   MONO PCT %  --   --  3*   EOS PCT %  --   --  0    < > = values in this interval not displayed  Results from last 7 days   Lab Units 12/22/20  0550   SODIUM mmol/L 135*   POTASSIUM mmol/L 4 9   CHLORIDE mmol/L 102   CO2 mmol/L 24   BUN mg/dL 46*   CREATININE mg/dL 0 91   ANION GAP mmol/L 9   CALCIUM mg/dL 8 9   ALBUMIN g/dL 1 8*   TOTAL BILIRUBIN mg/dL 1 20*   ALK PHOS U/L 98   ALT U/L 93*   AST U/L 50*   GLUCOSE RANDOM mg/dL 121         Results from last 7 days   Lab Units 12/22/20  1539 12/22/20  1109 12/22/20  0711 12/22/20  0410 12/22/20  0246 12/22/20  0001 12/21/20  2217 12/21/20  2015 12/21/20  1813 12/21/20  1601 12/21/20  1419 12/21/20  1158   POC GLUCOSE mg/dl 269* 297* 149* 106 66 251* 348* 241* 140 109 95 243*         Results from last 7 days   Lab Units 12/20/20  0507 12/19/20  1349   PROCALCITONIN ng/ml <0 05 <0 05           * I Have Reviewed All Lab Data Listed Above    * Additional Pertinent Lab Tests Reviewed: No New Labs Available For Today    Imaging:    Imaging Reports Reviewed Today Include:   Imaging Personally Reviewed by Myself Includes:      Recent Cultures (last 7 days):           Last 24 Hours Medication List:   Current Facility-Administered Medications   Medication Dose Route Frequency Provider Last Rate    haloperidol lactate  0 5 mg Intravenous Q1H PRN ZULMA Tong      LORazepam  0 5 mg Intravenous Q2H PRN Stan Escamilla MD      morphine injection  2 mg Intravenous Q30 Min PRN Stan Escamilla MD      ondansetron  4 mg Intravenous Q6H PRN Everardo Estevez PA-C      phenol  1 spray Mouth/Throat Q2H PRN Everardo Estevez PA-C          Today, Patient Was Seen By: Cecilia Graham PA-C    ** Please Note: Dictation voice to text software may have been used in the creation of this document   **

## 2020-12-24 PROCEDURE — 99232 SBSQ HOSP IP/OBS MODERATE 35: CPT | Performed by: PHYSICIAN ASSISTANT

## 2020-12-24 PROCEDURE — 99232 SBSQ HOSP IP/OBS MODERATE 35: CPT | Performed by: INTERNAL MEDICINE

## 2020-12-24 RX ORDER — MORPHINE SULFATE 20 MG/5ML
5 SOLUTION ORAL EVERY 2 HOUR PRN
Qty: 100 ML | Refills: 0 | Status: SHIPPED | OUTPATIENT
Start: 2020-12-24 | End: 2021-01-03 | Stop reason: DRUGHIGH

## 2020-12-24 RX ORDER — MORPHINE SULFATE 100 MG/5ML
5 SOLUTION, CONCENTRATE ORAL
Status: DISCONTINUED | OUTPATIENT
Start: 2020-12-24 | End: 2020-12-29

## 2020-12-24 RX ORDER — XYLITOL/YERBA SANTA
5 AEROSOL, SPRAY WITH PUMP (ML) MUCOUS MEMBRANE EVERY 4 HOURS PRN
Status: DISCONTINUED | OUTPATIENT
Start: 2020-12-24 | End: 2020-12-31 | Stop reason: HOSPADM

## 2020-12-24 RX ORDER — LORAZEPAM 0.5 MG/1
0.5 TABLET ORAL EVERY 6 HOURS PRN
Status: DISCONTINUED | OUTPATIENT
Start: 2020-12-24 | End: 2020-12-29

## 2020-12-24 RX ORDER — HALOPERIDOL 1 MG/1
0.5 TABLET ORAL EVERY 6 HOURS PRN
Status: DISCONTINUED | OUTPATIENT
Start: 2020-12-24 | End: 2020-12-29

## 2020-12-24 RX ORDER — LORAZEPAM 0.5 MG/1
0.5 TABLET ORAL EVERY 6 HOURS PRN
Qty: 15 TABLET | Refills: 0 | Status: SHIPPED | OUTPATIENT
Start: 2020-12-24 | End: 2021-01-03 | Stop reason: DRUGHIGH

## 2020-12-24 RX ORDER — HALOPERIDOL 0.5 MG/1
0.5 TABLET ORAL EVERY 6 HOURS PRN
Qty: 15 TABLET | Refills: 0 | Status: SHIPPED | OUTPATIENT
Start: 2020-12-24 | End: 2021-01-03

## 2020-12-24 RX ORDER — ONDANSETRON 4 MG/1
4 TABLET, ORALLY DISINTEGRATING ORAL EVERY 6 HOURS PRN
Status: DISCONTINUED | OUTPATIENT
Start: 2020-12-24 | End: 2020-12-29

## 2020-12-24 RX ADMIN — MORPHINE SULFATE 2 MG: 2 INJECTION, SOLUTION INTRAMUSCULAR; INTRAVENOUS at 08:50

## 2020-12-24 RX ADMIN — MORPHINE SULFATE 2 MG: 2 INJECTION, SOLUTION INTRAMUSCULAR; INTRAVENOUS at 02:07

## 2020-12-24 RX ADMIN — MORPHINE SULFATE 5 MG: 100 SOLUTION ORAL at 22:20

## 2020-12-24 NOTE — QUICK NOTE
Patient transitioned to oral medications for comfort as he is eating and drinking  Anticipated discharge to hospice in SNF setting, SNF selection/acceptance pending  Printed Rx for comfort (morphine solution, lorazepam, haloperidol) placed in chart as hospice agency and SNF unknown at this point  Printed Rx acceptable for hospice patient      Live Rea MD  [address] and Supportive Care

## 2020-12-24 NOTE — CASE MANAGEMENT
CM spoke to palliative physician and was told that pt does not qualify for a hospice house  He is sitting up in bed, eating and drinking and refusing to wear his O2  CM spoke to rena Tan and she does not want pt returned to [facility]  Was unhappy with the care he received there  She was hoping for [facility], [facility], or [facility]  [facility] and [facility] do not have a bed available at this time  CM spoke to [facility] Dianne GUERRA and vitals faxed to her for review  She will let CM know if they can accept and that a prior auth will have to be done  If pt goes in as hospice, he would be responsible for room and board that ranges from $1300  00 per month  Kaci Alva feels that hospice should be on hold at this time

## 2020-12-24 NOTE — CASE MANAGEMENT
[facility] called back and stated that they do not have a male bed  CM contacted [facility] and [facility] will review and let CM know decision

## 2020-12-24 NOTE — ASSESSMENT & PLAN NOTE
· Pt presented with generalized weakness and increased confusion, required mid flow on high-flow and was transferred to the ICU  He does not want intubation, and a conversation was held with his family regarding goals of care  They wish to make him comfort, he has been transferred back out of the ICU to medical-surgical floor  · Patient was coherent this morning, appears to be distressed    Nurse gave morphine for comfort and will continue to monitor  · Palliative care following, appreciate follow-up in recommendations  · Case management following, if the patient remains stable enough to transfer out of the hospital to skilled nursing facility with hospice services, will pursue  · If the patient continues to decline, will look for GIP bed here

## 2020-12-24 NOTE — PROGRESS NOTES
Progress Note - Palliative & Supportive Care  Georgia Rinaldi  65 y o   male  /-01   MRN: [id number]  Encounter: [id number]     ASSESSMENT:    Patient Active Problem List   Diagnosis    Other hyperlipidemia    Other insomnia    Essential hypertension    Slow transit constipation    Peripheral neuropathy    DVT prophylaxis    COVID-19 virus infection    [facility]A [facility]cere[facility]) [facility]    COPD (chronic obstructive pulmonary disease) (HCC)    Acute respiratory failure with hypoxia (HCC)    Acute metabolic encephalopathy    New onset a-fib ([facility] )    Positive blood culture    Hyperglycemia    Right Subdural hematoma ([facility] ) 09/2020       PLAN:    · Patient transitioning to oral medications for comfort today as he is eating and drinking  · Anticipated discharge to hospice in SNF setting, SNF selection/acceptance pending  · Printed Rx for comfort (morphine solution, lorazepam, haloperidol) placed in chart as hospice agency and SNF unknown at this point  · Printed Rx acceptable for hospice patient  3  Symptom management:   Haloperidol 0 5mg q6h PRN agitation   Lorazepam 0 5mg PO q6h PRN anxiety, insomnia   Morphine solution 5mg q1h PRN severe pain, dyspnea   Zofran ODT 4mg q6h PRN N/V    Code status: Level 4 - Comfort Care    We appreciate the opportunity to participate in this patient's care  We will continue to follow  Please do not hesitate to contact our on-call provider through our clinic answering service at [phone] should you have acute symptom control concerns  INTERVAL HISTORY:    Patient has been tolerating PO per RN, and is breathing comfortably on RA at rest  Plan is to discharge to SNF w/ hospice services  He received 4 doses of 2mg morphine IV but we are transitioning to oral medications today      MEDICATIONS / ALLERGIES:  all current active meds have been reviewed and current meds:   Current Facility-Administered Medications   Medication Dose Route Frequency  haloperidol (HALDOL) tablet 0 5 mg  0 5 mg Oral Q6H PRN    LORazepam (ATIVAN) tablet 0 5 mg  0 5 mg Oral Q6H PRN    Morphine Sulfate (Concentrate) oral concentrated solution 5 mg  5 mg Oral Q1H PRN    ondansetron (ZOFRAN-ODT) dispersible tablet 4 mg  4 mg Oral Q6H PRN    phenol (CHLORASEPTIC) 1 4 % mucosal liquid 1 spray  1 spray Mouth/Throat Q2H PRN    saliva substitute (MOUTH KOTE) mucosal solution 5 spray  5 spray Mouth/Throat Q4H PRN       No Known Allergies     OBJECTIVE:  /68   Pulse 91   Temp 100 1 °F (37 8 °C)   Resp 20   Ht 5' 4" (1 626 m)   Wt 55 2 kg (121 lb 11 1 oz)   SpO2 (!) 83%   BMI 20 89 kg/m²   Nursing notes and vital signs reviewed  PE limited secondary to COVID-19 airborne precautions  Physical exam obtained via visual observation from outside the room  Physical Exam:  Constitutional: Chronically and acutely ill-appearing elderly male seen supine in bed  In no acute physical or emotional distress  Resting comfortably  Head: Normocephalic and atraumatic  Eyes: EOM are normal    Respiratory: Effort normal on room air  No stridor  No respiratory distress  Gastrointestinal: No abdominal distension  Neurological: Sleeping but awakens and makes eye contact w/ medical team   Psychiatric: No agitation; otherwise unable to assess  Lab Results: I have personally reviewed pertinent labs  Albumin:  0   Lab Value Date/Time    ALB 1 8 (L) 12/22/2020 0550     Imaging Studies: I have personally reviewed pertinent reports  EKG, Pathology, and Other Studies: I have personally reviewed pertinent reports  Counseling / Coordination of Care: Total floor / unit time spent today 15 minutes  Greater than 50% of total time was spent with the patient and / or family counseling and / or coordination of care   A description of the counseling / coordination of care: symptom assessment and management, medication review and adjustment, psychosocial support, chart review, imaging review, lab review, hospice services, disposition planning  Arnulfo Rizvi MD  [facility]      Portions of this document may have been created using dictation software and as such some "sound alike" terms may have been generated by the system  Do not hesitate to contact me with any questions or clarifications

## 2020-12-24 NOTE — ASSESSMENT & PLAN NOTE
· Pt with recent hx of stroke a few months ago followed by [facility] thought to be traumatic in setting of falls at home   · Anticoagulation discontinued, comfort measures only

## 2020-12-24 NOTE — ASSESSMENT & PLAN NOTE
· Likely secondary to COVID-19 infection and severe hypoxia  · Patient with waxing and waning mentation and appears over the last 24 hours

## 2020-12-24 NOTE — PROGRESS NOTES
Progress Note Cynthia Clark 1950, 65 y o  male MRN: [id number]    Unit/Bed#: -01 Encounter: [id number]    Primary Care Provider: Rosalia Aceves MD   Date and time admitted to hospital: 12/10/2020 12:59 PM      * COVID-19 virus infection  Assessment & Plan  · Pt presented with generalized weakness and increased confusion, required mid flow on high-flow and was transferred to the ICU  He does not want intubation, and a conversation was held with his family regarding goals of care  They wish to make him comfort, he has been transferred back out of the ICU to medical-surgical floor  · Patient was coherent this morning, appears to be distressed    Nurse gave morphine for comfort and will continue to monitor  · Palliative care following, appreciate follow-up in recommendations  · Case management following, if the patient remains stable enough to transfer out of the hospital to skilled nursing facility with hospice services, will pursue  · If the patient continues to decline, will look for GIP bed here    COPD (chronic obstructive pulmonary disease) ([address] )  Assessment & Plan  · Not typically maintained on supplemental O2 as an outpatient     Acute respiratory failure with hypoxia ([address] )  Assessment & Plan  · Supplemental O2 for comfort    CVA (cerebral vascular accident) ([address] )  Assessment & Plan  · Pt with recent hx of stroke a few months ago followed by [facility] thought to be traumatic in setting of falls at home   · Anticoagulation discontinued, comfort measures only    Acute metabolic encephalopathy  Assessment & Plan  · Likely secondary to COVID-19 infection and severe hypoxia  · Patient with waxing and waning mentation and appears over the last 24 hours    New onset a-fib ([address] )  Assessment & Plan  · Notable for new onset afib this hospitalization    Positive blood culture  Assessment & Plan  · 1/2 BC (+) for staph coag negative contaminant    Hyperglycemia  Assessment & Plan  · No longer monitoring blood glucose    Essential hypertension  Assessment & Plan  · Noted to have hypotension on admission    Other hyperlipidemia  Assessment & Plan  · Statin discontinued, comfort measures        VTE Pharmacologic Prophylaxis:   Pharmacologic: comfort  Mechanical VTE Prophylaxis in Place:     Patient Centered Rounds: I have performed bedside rounds with nursing staff today  Discussions with Specialists or Other Care Team Provider: palliative, CM     Education and Discussions with Family / Patient:     Time Spent for Care: 15 minutes  More than 50% of total time spent on counseling and coordination of care as described above  Current Length of Stay: 14 day(s)    Current Patient Status: Inpatient   Certification Statement: The patient will continue to require additional inpatient hospital stay due to awaiting determination of stability to transfer vs inpatient hospice bed here    Discharge Plan: hospice     Code Status: Level 4 - Comfort Care      Subjective:   Patient seen this morning, per nurse he appears to be more uncomfortable and has received morphine to help  He also is trying to speak but his speech is incomprehensible  He appears to have a dry mouth and will start saliva substitute for that  Objective:     Vitals:   Temp (24hrs), Av 1 °F (37 8 °C), Min:100 1 °F (37 8 °C), Max:100 1 °F (37 8 °C)    Temp:  [100 1 °F (37 8 °C)] 100 1 °F (37 8 °C)  SpO2:  [83 %] 83 %  Body mass index is 20 89 kg/m²  Input and Output Summary (last 24 hours): Intake/Output Summary (Last 24 hours) at 2020 1021  Last data filed at 2020 4158  Gross per 24 hour   Intake 60 ml   Output 850 ml   Net -790 ml       Physical Exam:     Physical Exam  Constitutional:       General: He is in acute distress  Appearance: He is ill-appearing (Chronic/acute)  HENT:      Mouth/Throat:      Mouth: Mucous membranes are dry  Pulmonary:      Effort: No respiratory distress     Neurological:      Mental Status: He is alert  He is disoriented  Additional Data:     Labs:    Results from last 7 days   Lab Units 12/22/20  0551  12/20/20  0507   WBC Thousand/uL 11 05*   < > 12 23*   HEMOGLOBIN g/dL 11 1*   < > 12 3   HEMATOCRIT % 32 5*   < > 36 1*   PLATELETS Thousands/uL 144*   < > 210   BANDS PCT %  --   --  1   LYMPHO PCT %  --   --  3*   MONO PCT %  --   --  3*   EOS PCT %  --   --  0    < > = values in this interval not displayed  Results from last 7 days   Lab Units 12/22/20  0550   SODIUM mmol/L 135*   POTASSIUM mmol/L 4 9   CHLORIDE mmol/L 102   CO2 mmol/L 24   BUN mg/dL 46*   CREATININE mg/dL 0 91   ANION GAP mmol/L 9   CALCIUM mg/dL 8 9   ALBUMIN g/dL 1 8*   TOTAL BILIRUBIN mg/dL 1 20*   ALK PHOS U/L 98   ALT U/L 93*   AST U/L 50*   GLUCOSE RANDOM mg/dL 121         Results from last 7 days   Lab Units 12/22/20  1539 12/22/20  1109 12/22/20  0711 12/22/20  0410 12/22/20  0246 12/22/20  0001 12/21/20  2217 12/21/20  2015 12/21/20  1813 12/21/20  1601 12/21/20  1419 12/21/20  1158   POC GLUCOSE mg/dl 269* 297* 149* 106 66 251* 348* 241* 140 109 95 243*         Results from last 7 days   Lab Units 12/20/20  0507 12/19/20  1349   PROCALCITONIN ng/ml <0 05 <0 05       Recent Cultures (last 7 days):           Last 24 Hours Medication List:   Current Facility-Administered Medications   Medication Dose Route Frequency Provider Last Rate    haloperidol  0 5 mg Oral Q6H PRN Kae Cordova MD      LORazepam  0 5 mg Oral Q6H PRN Kae Cordova MD      Morphine Sulfate (Concentrate)  5 mg Oral Q1H PRN Kae Cordova MD      ondansetron  4 mg Oral Q6H PRN Kae Cordova MD      phenol  1 spray Mouth/Throat Q2H PRN Ana Rosa Tolentino PA-C      saliva substitute  5 spray Mouth/Throat Q4H PRN Destini Ontiveros PA-C          Today, Patient Was Seen By: Destini Ontiveros PA-C    ** Please Note: Dictation voice to text software may have been used in the creation of this document   **

## 2020-12-24 NOTE — TREATMENT PLAN
I went back and talked to the patient at the bedside, he does not want oxygen in place  We discussed the risks and benefits of oxygen supplement and swell as the goals of care  He does not want to be intubated, he does not want oxygen supplement  He wants to be comfortable  I talked with him about possibly going home with hospice services, he is afraid of getting his family sick with COVID if he goes home  During my conversation, I checked his oxygen saturation multiple times, using multiple fingers as well as a toe  O2 range 77-86%

## 2020-12-24 NOTE — CASE MANAGEMENT
[facility] is reviewing  Pt ran a fever last night, so would have to be 72 hrs fever free before he could be accepted  Would also need PT/OT notes and Dustin Castro informed of this  She will see pt and make sure he is capable of tolerating this  Insurance Cash Ghulam will have to be obtained prior

## 2020-12-24 NOTE — SOCIAL WORK
Palliative LSW looked in on patient from outside window due to [place]  LSW appreciates the opportunity to provider patient/family with inpatient emotional support and guidance while patient continues to receive medical attention from the medical team     Topics discussed: With nursing staff, patient now eating/drinking with assistance  Areas that need follow-up: none  Resources given: none  Others present: Dr Hutson Stage will continue to follow as requested by the medical team, patient, or family      Call placed to patient's niece, Shantanu Villarreal to provide update, left message

## 2020-12-24 NOTE — CASE MANAGEMENT
CM spoke to rena Garcia William to see if home hospice would be an option? She is currently out of the home shopping, but will speak to pt's sister and will call us with the decision  She is considering this option and will let us know after family discussion    SLIM informed

## 2020-12-25 PROCEDURE — 99232 SBSQ HOSP IP/OBS MODERATE 35: CPT | Performed by: NURSE PRACTITIONER

## 2020-12-25 RX ADMIN — MORPHINE SULFATE 5 MG: 100 SOLUTION ORAL at 04:22

## 2020-12-25 RX ADMIN — MORPHINE SULFATE 5 MG: 100 SOLUTION ORAL at 02:35

## 2020-12-25 NOTE — ASSESSMENT & PLAN NOTE
· Noted to have hypotension on admission, vital signs are not being routinely monitored in the setting of comfort measures only

## 2020-12-25 NOTE — ASSESSMENT & PLAN NOTE
· Pt presented with generalized weakness and increased confusion, required mid flow on high-flow and was transferred to the ICU  He does not want intubation, and a conversation was held with his family regarding goals of care  They wish to make him comfort, he was transferred back out of the ICU to medical-surgical floor  · Patient was coherent this morning, appears to be distressed    Nurse gave morphine for comfort and will continue to monitor  · Palliative care following, appreciate follow-up in recommendations  · Comfort measures in place only at this time  · Case management following, if the patient remains stable enough to transfer out of the hospital to skilled nursing facility with hospice services, will pursue  · If the patient continues to decline, will look for GIP bed here

## 2020-12-25 NOTE — PROGRESS NOTES
Progress Note Gus Cochran 1950, 65 y o  male MRN: [id number]    Unit/Bed#: -01 Encounter: [id number]    Primary Care Provider: Josephine Daly MD   Date and time admitted to hospital: 12/10/2020 12:59 PM    * COVID-19 virus infection  Assessment & Plan  · Pt presented with generalized weakness and increased confusion, required mid flow on high-flow and was transferred to the ICU  He does not want intubation, and a conversation was held with his family regarding goals of care  They wish to make him comfort, he was transferred back out of the ICU to medical-surgical floor  · Patient was coherent this morning, appears to be distressed    Nurse gave morphine for comfort and will continue to monitor  · Palliative care following, appreciate follow-up in recommendations  · Comfort measures in place only at this time  · Case management following, if the patient remains stable enough to transfer out of the hospital to skilled nursing facility with hospice services, will pursue  · If the patient continues to decline, will look for GIP bed here    Acute respiratory failure with hypoxia (HCC)  Assessment & Plan  · Supplemental O2 for comfort    Acute metabolic encephalopathy  Assessment & Plan  · Likely secondary to COVID-19 infection and severe hypoxia  · Patient with waxing and waning mentation and appears over the last 24 hours    Hyperglycemia  Assessment & Plan  · No longer monitoring blood glucose    Positive blood culture  Assessment & Plan  · 1/2 BC (+) for staph coag negative contaminant    New onset a-fib ([address] )  Assessment & Plan  · Notable for new onset afib this hospitalization    COPD (chronic obstructive pulmonary disease) ([address] )  Assessment & Plan  · Not typically maintained on supplemental O2 as an outpatient     CVA (cerebral vascular accident) ([address] )  Assessment & Plan  · Pt with recent hx of stroke a few months ago followed by [facility] thought to be traumatic in setting of falls at home · Anticoagulation discontinued, comfort measures only    Essential hypertension  Assessment & Plan  · Noted to have hypotension on admission, vital signs are not being routinely monitored in the setting of comfort measures only    Other hyperlipidemia  Assessment & Plan  · Statin discontinued, comfort measures       VTE Pharmacologic Prophylaxis:   Pharmacologic: Comfort measures  Mechanical VTE Prophylaxis in Place: No    Patient Centered Rounds: I have performed bedside rounds with nursing staff today  Discussions with Specialists or Other Care Team Provider:  Reviewed previous provider's notes    Education and Discussions with Family / Patient:  Discussed plan of care with patient's family    Time Spent for Care: 20 minutes  More than 50% of total time spent on counseling and coordination of care as described above  Current Length of Stay: 15 day(s)    Current Patient Status: Inpatient   Certification Statement: The patient will continue to require additional inpatient hospital stay due to Management of COVID-19 symptoms and hospice care attempting to find hospice bed with case management    Discharge Plan / Estimated Discharge Date:  Unknown patient is in a very unstable condition pursuing hospice and comfort measures at this time      Code Status: Level 4 - Comfort Care      Subjective:   Patient does not appear to be in acute distress at this time he is resting comfortably in bed with his eyes closed he has 2 L nasal cannula in place for comfort  Objective:     Vitals:   No data recorded  Body mass index is 20 89 kg/m²  Input and Output Summary (last 24 hours): Intake/Output Summary (Last 24 hours) at 12/25/2020 1545  Last data filed at 12/24/2020 2000  Gross per 24 hour   Intake 120 ml   Output --   Net 120 ml       Physical Exam:     Physical Exam  Vitals signs and nursing note reviewed  Constitutional:       General: He is not in acute distress       Appearance: He is ill-appearing  HENT:      Head: Normocephalic  Neck:      Musculoskeletal: Normal range of motion  Cardiovascular:      Rate and Rhythm: Tachycardia present  Pulmonary:      Effort: No respiratory distress  Abdominal:      General: Abdomen is flat  Musculoskeletal: Normal range of motion  Skin:     General: Skin is warm  Coloration: Skin is pale  Neurological:      Mental Status: Mental status is at baseline  Additional Data:     Labs:    Results from last 7 days   Lab Units 12/22/20  0551  12/20/20  0507   WBC Thousand/uL 11 05*   < > 12 23*   HEMOGLOBIN g/dL 11 1*   < > 12 3   HEMATOCRIT % 32 5*   < > 36 1*   PLATELETS Thousands/uL 144*   < > 210   LYMPHO PCT %  --   --  3*   MONO PCT %  --   --  3*   EOS PCT %  --   --  0    < > = values in this interval not displayed  Results from last 7 days   Lab Units 12/22/20  0550   POTASSIUM mmol/L 4 9   CHLORIDE mmol/L 102   CO2 mmol/L 24   BUN mg/dL 46*   CREATININE mg/dL 0 91   CALCIUM mg/dL 8 9   ALK PHOS U/L 98   ALT U/L 93*   AST U/L 50*           * I Have Reviewed All Lab Data Listed Above  * Additional Pertinent Lab Tests Reviewed: [facility]  Recent Cultures (last 7 days):           Last 24 Hours Medication List:   Current Facility-Administered Medications   Medication Dose Route Frequency Provider Last Rate    haloperidol  0 5 mg Oral Q6H PRN Caroline Stout MD      LORazepam  0 5 mg Oral Q6H PRN Caroline Stout MD      Morphine Sulfate (Concentrate)  5 mg Oral Q1H PRN Caroline Stout MD      ondansetron  4 mg Oral Q6H PRN Caroline Stout MD      phenol  1 spray Mouth/Throat Q2H PRN Antony Cancel, PA-C      saliva substitute  5 spray Mouth/Throat Q4H PRN Kyle Blocker, PA-C          Today, Patient Was Seen By: ZULMA Machuca    ** Please Note: Dragon 360 Dictation voice to text software may have been used in the creation of this document   **

## 2020-12-26 PROCEDURE — 99232 SBSQ HOSP IP/OBS MODERATE 35: CPT | Performed by: NURSE PRACTITIONER

## 2020-12-26 RX ADMIN — LORAZEPAM 0.5 MG: 0.5 TABLET ORAL at 03:49

## 2020-12-26 NOTE — ASSESSMENT & PLAN NOTE
· Likely secondary to COVID-19 infection and severe hypoxia  · Patient with waxing and waning mentation and appears to be improving over the last 24 hours

## 2020-12-26 NOTE — PROGRESS NOTES
Progress Note Chiara Dexter 1950, 65 y o  male MRN: [id number]    Unit/Bed#: -01 Encounter: [id number]    Primary Care Provider: Daniela Patel MD   Date and time admitted to hospital: 12/10/2020 12:59 PM    * COVID-19 virus infection  Assessment & Plan  · Pt presented with generalized weakness and increased confusion, required mid flow on high-flow and was transferred to the ICU  He does not want intubation, and a conversation was held with his family regarding goals of care  They wish to make him comfort, he was transferred back out of the ICU to medical-surgical floor  · Patient was coherent this morning, appears to be distressed    Nurse gave morphine for comfort and will continue to monitor  · Palliative care following, appreciate follow-up in recommendations  · Comfort measures in place only at this time  · Case management following, if the patient remains stable enough to transfer out of the hospital to skilled nursing facility with hospice services, will pursue  · If the patient continues to decline, will look for GIP bed here    Acute respiratory failure with hypoxia (HCC)  Assessment & Plan  · Supplemental O2 for comfort  · Currently on 2 L, 90%    Acute metabolic encephalopathy  Assessment & Plan  · Likely secondary to COVID-19 infection and severe hypoxia  · Patient with waxing and waning mentation and appears to be improving over the last 24 hours    Hyperglycemia  Assessment & Plan  · No longer monitoring blood glucose    Positive blood culture  Assessment & Plan  · 1/2 BC (+) for staph coag negative contaminant    New onset a-fib ([address] )  Assessment & Plan  · Notable for new onset afib this hospitalization  · Heart rate 104    COPD (chronic obstructive pulmonary disease) ([address] )  Assessment & Plan  · Not typically maintained on supplemental O2 as an outpatient     CVA (cerebral vascular accident) ([address] )  Assessment & Plan  · Pt with recent hx of stroke a few months ago followed by [facility] thought to be traumatic in setting of falls at home   · Anticoagulation discontinued, comfort measures only    Essential hypertension  Assessment & Plan  · Noted to have hypotension on admission, vital signs are not being routinely monitored in the setting of comfort measures only    Other hyperlipidemia  Assessment & Plan  · Statin discontinued, comfort measures       VTE Pharmacologic Prophylaxis:   Pharmacologic: Comfort measures  Mechanical VTE Prophylaxis in Place: Yes    Patient Centered Rounds: I have performed bedside rounds with nursing staff today  Discussions with Specialists or Other Care Team Provider:  Discussed plan of care with case management and primary RN    Education and Discussions with Family / Patient:  Discussed plan of care with family    Time Spent for Care: 15 minutes  More than 50% of total time spent on counseling and coordination of care as described above  Current Length of Stay: 16 day(s)    Current Patient Status: Inpatient   Certification Statement: The patient will continue to require additional inpatient hospital stay due to Supportive care, with transition to hospice    Discharge Plan / Estimated Discharge Date:  Pending stability for transfer to hospice    Code Status: Level 4 - Comfort Care    Subjective:   Resting comfortably in bed lethargic but arousable  He was able to eat a small amount of his breakfast and lunch today  Offers no complaints does not appear to be in any acute distress    Objective:     Vitals:   Temp (24hrs), Av 7 °F (37 6 °C), Min:99 7 °F (37 6 °C), Max:99 7 °F (37 6 °C)    Temp:  [99 7 °F (37 6 °C)] 99 7 °F (37 6 °C)  HR:  [104] 104  Resp:  [20] 20  BP: (105)/(66) 105/66  SpO2:  [93 %] 93 %  Body mass index is 20 89 kg/m²  Input and Output Summary (last 24 hours):        Intake/Output Summary (Last 24 hours) at 2020 1014  Last data filed at 2020 2305  Gross per 24 hour   Intake --   Output 1025 ml   Net -1025 ml       Physical Exam:     Physical Exam  Vitals signs and nursing note reviewed  Constitutional:       General: He is not in acute distress  Appearance: He is underweight  HENT:      Head: Normocephalic  Neck:      Musculoskeletal: Normal range of motion  Cardiovascular:      Rate and Rhythm: Normal rate  Pulmonary:      Effort: Pulmonary effort is normal    Abdominal:      General: Abdomen is flat  Musculoskeletal: Normal range of motion  Skin:     General: Skin is warm  Neurological:      General: No focal deficit present  Mental Status: He is alert  Psychiatric:         Mood and Affect: Mood normal          Thought Content: Thought content normal          Judgment: Judgment normal          Additional Data:     Labs:    Results from last 7 days   Lab Units 12/22/20  0551  12/20/20  0507   WBC Thousand/uL 11 05*   < > 12 23*   HEMOGLOBIN g/dL 11 1*   < > 12 3   HEMATOCRIT % 32 5*   < > 36 1*   PLATELETS Thousands/uL 144*   < > 210   LYMPHO PCT %  --   --  3*   MONO PCT %  --   --  3*   EOS PCT %  --   --  0    < > = values in this interval not displayed  Results from last 7 days   Lab Units 12/22/20  0550   POTASSIUM mmol/L 4 9   CHLORIDE mmol/L 102   CO2 mmol/L 24   BUN mg/dL 46*   CREATININE mg/dL 0 91   CALCIUM mg/dL 8 9   ALK PHOS U/L 98   ALT U/L 93*   AST U/L 50*           * I Have Reviewed All Lab Data Listed Above  * Additional Pertinent Lab Tests Reviewed:  All [facility] Admission Reviewed    Recent Cultures (last 7 days):           Last 24 Hours Medication List:   Current Facility-Administered Medications   Medication Dose Route Frequency Provider Last Rate    haloperidol  0 5 mg Oral Q6H PRN Svetlana Ludwig MD      LORazepam  0 5 mg Oral Q6H PRN Svetlana Ludwig MD      Morphine Sulfate (Concentrate)  5 mg Oral Q1H PRN Svetlana Ludwig MD      ondansetron  4 mg Oral Q6H PRN Svetlana Ludwig MD      phenol  1 spray Mouth/Throat Q2H PRN Franklin Ortiz PA-C      saliva substitute  5 spray Mouth/Throat Q4H PRN Madhavi Hankins PA-C          Today, Patient Was Seen By: ZULMA Strong    ** Please Note: Dragon 360 Dictation voice to text software may have been used in the creation of this document   **

## 2020-12-27 PROBLEM — E78.49 OTHER HYPERLIPIDEMIA: Status: RESOLVED | Noted: 2020-08-17 | Resolved: 2020-12-27

## 2020-12-27 PROBLEM — R73.9 HYPERGLYCEMIA: Status: RESOLVED | Noted: 2020-12-14 | Resolved: 2020-12-27

## 2020-12-27 PROCEDURE — 99232 SBSQ HOSP IP/OBS MODERATE 35: CPT | Performed by: NURSE PRACTITIONER

## 2020-12-27 RX ADMIN — LORAZEPAM 0.5 MG: 0.5 TABLET ORAL at 00:00

## 2020-12-27 NOTE — CASE MANAGEMENT
Cm spoke to [facility] and they are unable to care for him at home w hospice  He does not have the finances for 24/7 care  They would like [address] [place] [address] and they are agreeable to any hospice agency they contract with  Message sent to [address] [place] [address]  Also, appears pt may have medicaid from his stay at [facility]--will need to follow up on Monday to clarify--as it will pay for his room and board   Cm to follow

## 2020-12-27 NOTE — PROGRESS NOTES
Progress Note Oliver Sousa 1950, 65 y o  male MRN: [id number]    Unit/Bed#: -01 Encounter: [id number]    Primary Care Provider: Leela Burgess MD   Date and time admitted to hospital: 12/10/2020 12:59 PM    * COVID-19 virus infection  Assessment & Plan  · Pt presented with generalized weakness and increased confusion, required mid flow on high-flow and was transferred to the ICU  He does not want intubation, and a conversation was held with his family regarding goals of care  They wish to make him comfort, he was transferred back out of the ICU to medical-surgical floor  · Patient was coherent this morning, appears to be distressed    Nurse gave morphine for comfort and will continue to monitor  · Palliative care following, appreciate follow-up in recommendations  · Comfort measures in place only at this time  · Case management following, if the patient remains stable enough to transfer out of the hospital to skilled nursing facility with hospice services, will pursue  · If the patient continues to decline, will look for GIP bed here    Acute respiratory failure with hypoxia (HCC)  Assessment & Plan  · Supplemental O2 for comfort  · Currently on 2 L, 93%, patient does remove his oxygen drops into the high 70s mid 42O    Acute metabolic encephalopathy  Assessment & Plan  · Likely secondary to COVID-19 infection and severe hypoxia  · Patient with waxing and waning mentation and appears to be stable over the last 24 hours    Positive blood culture  Assessment & Plan  · 1/2 BC (+) for staph coag negative contaminant    New onset a-fib ([address] )  Assessment & Plan  · Notable for new onset afib this hospitalization  · Heart rate 104    COPD (chronic obstructive pulmonary disease) ([address] )  Assessment & Plan  · Not typically maintained on supplemental O2 as an outpatient     CVA (cerebral vascular accident) ([address] )  Assessment & Plan  · Pt with recent hx of stroke a few months ago followed by [facility] thought to be traumatic in setting of falls at home   · Anticoagulation discontinued, comfort measures only    Essential hypertension  Assessment & Plan  · Noted to have hypotension on admission, vital signs are not being routinely monitored in the setting of comfort measures only    Hyperglycemia-resolved as of 12/27/2020  Assessment & Plan  · No longer monitoring blood glucose    Other hyperlipidemia-resolved as of 12/27/2020  Assessment & Plan  · Statin discontinued, comfort measures       VTE Pharmacologic Prophylaxis:   Pharmacologic: Comfort measures only  Mechanical VTE Prophylaxis in Place: No    Patient Centered Rounds: I have performed bedside rounds with nursing staff today  Discussions with Specialists or Other Care Team Provider:  Discussed with case management primary RN    Education and Discussions with Family / Patient:  Discussed plan of care with family denies any additional questions or concerns at this time    Time Spent for Care: 20 minutes  More than 50% of total time spent on counseling and coordination of care as described above  Current Length of Stay: 17 day(s)    Current Patient Status: Inpatient   Certification Statement: The patient will continue to require additional inpatient hospital stay due to Hospice care pending appropriate hospice facility    Discharge Plan / Estimated Discharge Date:  Pending available resources patient cannot return home for hospice is there are no family members that live with him and can provide 24 hour care    Code Status: Level 4 - Comfort Care      Subjective:   Resting comfortably in bed    Objective:     Vitals:   No data recorded  SpO2:  [93 %] 93 %  Body mass index is 20 89 kg/m²  Input and Output Summary (last 24 hours):        Intake/Output Summary (Last 24 hours) at 12/27/2020 1023  Last data filed at 12/27/2020 0641  Gross per 24 hour   Intake 50 ml   Output 545 ml   Net -495 ml       Physical Exam:     Physical Exam  Vitals signs and nursing note reviewed  Constitutional:       Appearance: He is toxic-appearing  HENT:      Head: Normocephalic  Right Ear: Tympanic membrane normal       Nose: Nose normal       Mouth/Throat:      Mouth: Mucous membranes are moist    Neck:      Musculoskeletal: Normal range of motion  Cardiovascular:      Rate and Rhythm: Normal rate  Pulmonary:      Effort: Pulmonary effort is normal    Abdominal:      General: Abdomen is flat  Skin:     General: Skin is warm  Coloration: Skin is pale  Neurological:      Mental Status: Mental status is at baseline  He is lethargic and disoriented  Additional Data:     Labs:    Results from last 7 days   Lab Units 12/22/20  0551   WBC Thousand/uL 11 05*   HEMOGLOBIN g/dL 11 1*   HEMATOCRIT % 32 5*   PLATELETS Thousands/uL 144*     Results from last 7 days   Lab Units 12/22/20  0550   POTASSIUM mmol/L 4 9   CHLORIDE mmol/L 102   CO2 mmol/L 24   BUN mg/dL 46*   CREATININE mg/dL 0 91   CALCIUM mg/dL 8 9   ALK PHOS U/L 98   ALT U/L 93*   AST U/L 50*           * I Have Reviewed All Lab Data Listed Above  * Additional Pertinent Lab Tests Reviewed: [facility] Review[facility]    Recent Cultures (last 7 days):         Last 24 Hours Medication List:   Current Facility-Administered Medications   Medication Dose Route Frequency Provider Last Rate    haloperidol  0 5 mg Oral Q6H PRN Brandon Gupta MD      LORazepam  0 5 mg Oral Q6H PRN Brandon Gupta MD      Morphine Sulfate (Concentrate)  5 mg Oral Q1H PRN Brandon Gupta MD      ondansetron  4 mg Oral Q6H PRN Brandon Gupta MD      phenol  1 spray Mouth/Throat Q2H PRN Talib Pike PA-C      saliva substitute  5 spray Mouth/Throat Q4H PRN Alyce Charlton PA-C          Today, Patient Was Seen By: ZULMA Nair    ** Please Note: Dragon 360 Dictation voice to text software may have been used in the creation of this document   **

## 2020-12-27 NOTE — ASSESSMENT & PLAN NOTE
· Noted to have hypotension on admission, vital signs are not being routinely monitored in the setting of comfort measures only Metronidazole Counseling:  I discussed with the patient the risks of metronidazole including but not limited to seizures, nausea/vomiting, a metallic taste in the mouth, nausea/vomiting and severe allergy.

## 2020-12-27 NOTE — ASSESSMENT & PLAN NOTE
· Supplemental O2 for comfort  · Currently on 2 L, 93%, patient does remove his oxygen drops into the high 70s mid 80s

## 2020-12-27 NOTE — ASSESSMENT & PLAN NOTE
· Likely secondary to COVID-19 infection and severe hypoxia  · Patient with waxing and waning mentation and appears to be stable over the last 24 hours

## 2020-12-27 NOTE — PROGRESS NOTES
Pt up this morning watching TV having good conversations with Rn  He ate half of his breakfast with little assistance  93% on 2L  HR 47

## 2020-12-27 NOTE — PLAN OF CARE
Problem: Potential for Falls  Goal: Patient will remain free of falls  Description: INTERVENTIONS:  - Assess patient frequently for physical needs  -  Identify cognitive and physical deficits and behaviors that affect risk of falls    -  [place] fall precautions as indicated by assessment   - Educate patient/family on patient safety including physical limitations  - Instruct patient to call for assistance with activity based on assessment  - Modify environment to reduce risk of injury  - Consider OT/PT consult to assist with strengthening/mobility  Outcome: Progressing     Problem: PAIN - ADULT  Goal: Verbalizes/displays adequate comfort level or baseline comfort level  Description: Interventions:  - Encourage patient to monitor pain and request assistance  - Assess pain using appropriate pain scale  - Administer analgesics based on type and severity of pain and evaluate response  - Implement non-pharmacological measures as appropriate and evaluate response  - Consider cultural and social influences on pain and pain management  - Notify physician/advanced practitioner if interventions unsuccessful or patient reports new pain  Outcome: Progressing     Problem: INFECTION - ADULT  Goal: Absence or prevention of progression during hospitalization  Description: INTERVENTIONS:  - Assess and monitor for signs and symptoms of infection  - Monitor lab/diagnostic results  - Monitor all insertion sites, i e  indwelling lines, tubes, and drains  - Monitor endotracheal if appropriate and nasal secretions for changes in amount and color  - [place] appropriate cooling/warming therapies per order  - Administer medications as ordered  - Instruct and encourage patient and family to use good hand hygiene technique  - Identify and instruct in appropriate isolation precautions for identified infection/condition  Outcome: Progressing  Goal: Absence of fever/infection during neutropenic period  Description: INTERVENTIONS:  - Monitor WBC    Outcome: Progressing     Problem: SAFETY ADULT  Goal: Patient will remain free of falls  Description: INTERVENTIONS:  - Assess patient frequently for physical needs  -  Identify cognitive and physical deficits and behaviors that affect risk of falls    -  [place] fall precautions as indicated by assessment   - Educate patient/family on patient safety including physical limitations  - Instruct patient to call for assistance with activity based on assessment  - Modify environment to reduce risk of injury  - Consider OT/PT consult to assist with strengthening/mobility  Outcome: Progressing  Goal: Maintain or return to baseline ADL function  Description: INTERVENTIONS:  -  Assess patient's ability to carry out ADLs; assess patient's baseline for ADL function and identify physical deficits which impact ability to perform ADLs (bathing, care of mouth/teeth, toileting, grooming, dressing, etc )  - Assess/evaluate cause of self-care deficits   - Assess range of motion  - Assess patient's mobility; develop plan if impaired  - Assess patient's need for assistive devices and provide as appropriate  - Encourage maximum independence but intervene and supervise when necessary  - Involve family in performance of ADLs  - Assess for home care needs following discharge   - Consider OT consult to assist with ADL evaluation and planning for discharge  - Provide patient education as appropriate  Outcome: Progressing  Goal: Maintain or return mobility status to optimal level  Description: INTERVENTIONS:  - Assess patient's baseline mobility status (ambulation, transfers, stairs, etc )    - Identify cognitive and physical deficits and behaviors that affect mobility  - Identify mobility aids required to assist with transfers and/or ambulation (gait belt, sit-to-stand, lift, walker, cane, etc )  - [place] fall precautions as indicated by assessment  - Record patient progress and toleration of activity level on Mobility SBAR; progress patient to next Phase/Stage  - Instruct patient to call for assistance with activity based on assessment  - Consider rehabilitation consult to assist with strengthening/weightbearing, etc   Outcome: Progressing     Problem: DISCHARGE PLANNING  Goal: Discharge to home or other facility with appropriate resources  Description: INTERVENTIONS:  - Identify barriers to discharge w/patient and caregiver  - Arrange for needed discharge resources and transportation as appropriate  - Identify discharge learning needs (meds, wound care, etc )  - Arrange for interpretive services to assist at discharge as needed  - Refer to Case Management Department for coordinating discharge planning if the patient needs post-hospital services based on physician/advanced practitioner order or complex needs related to functional status, cognitive ability, or social support system  Outcome: Progressing     Problem: Knowledge Deficit  Goal: Patient/family/caregiver demonstrates understanding of disease process, treatment plan, medications, and discharge instructions  Description: Complete learning assessment and assess knowledge base    Interventions:  - Provide teaching at level of understanding  - Provide teaching via preferred learning methods  Outcome: Progressing     Problem: Prexisting or High Potential for Compromised Skin Integrity  Goal: Skin integrity is maintained or improved  Description: INTERVENTIONS:  - Identify patients at risk for skin breakdown  - Assess and monitor skin integrity  - Assess and monitor nutrition and hydration status  - Monitor labs   - Assess for incontinence   - Turn and reposition patient  - Assist with mobility/ambulation  - Relieve pressure over bony prominences  - Avoid friction and shearing  - Provide appropriate hygiene as needed including keeping skin clean and dry  - Evaluate need for skin moisturizer/barrier cream  - Collaborate with interdisciplinary team   - Patient/family teaching  - Consider wound care consult   Outcome: Progressing     Problem: RESPIRATORY - ADULT  Goal: Achieves optimal ventilation and oxygenation  Description: INTERVENTIONS:  - Assess for changes in respiratory status  - Assess for changes in mentation and behavior  - Position to facilitate oxygenation and minimize respiratory effort  - Oxygen administered by appropriate delivery if ordered  - Initiate smoking cessation education as indicated  - Encourage broncho-pulmonary hygiene including cough, deep breathe, Incentive Spirometry  - Assess the need for suctioning and aspirate as needed  - Assess and instruct to report SOB or any respiratory difficulty  - Respiratory Therapy support as indicated  Outcome: Progressing     Problem: GENITOURINARY - ADULT  Goal: Maintains or returns to baseline urinary function  Description: INTERVENTIONS:  - Assess urinary function  - Encourage oral fluids to ensure adequate hydration if ordered  - Administer IV fluids as ordered to ensure adequate hydration  - Administer ordered medications as needed  - Offer frequent toileting  - Follow urinary retention protocol if ordered  Outcome: Progressing  Goal: Absence of urinary retention  Description: INTERVENTIONS:  - Assess patients ability to void and empty bladder  - Monitor I/O  - Bladder scan as needed  - Discuss with physician/AP medications to alleviate retention as needed  - Discuss catheterization for long term situations as appropriate  Outcome: Progressing     Problem: METABOLIC, FLUID AND ELECTROLYTES - ADULT  Goal: Electrolytes maintained within normal limits  Description: INTERVENTIONS:  - Monitor labs and assess patient for signs and symptoms of electrolyte imbalances  - Administer electrolyte replacement as ordered  - Monitor response to electrolyte replacements, including repeat lab results as appropriate  - Instruct patient on fluid and nutrition as appropriate  Outcome: Progressing  Goal: Fluid balance maintained  Description: INTERVENTIONS:  - Monitor labs   - Monitor I/O and WT  - Instruct patient on fluid and nutrition as appropriate  - Assess for signs & symptoms of volume excess or deficit  Outcome: Progressing  Goal: Glucose maintained within target range  Description: INTERVENTIONS:  - Monitor Blood Glucose as ordered  - Assess for signs and symptoms of hyperglycemia and hypoglycemia  - Administer ordered medications to maintain glucose within target range  - Assess nutritional intake and initiate nutrition service referral as needed  Outcome: Progressing     Problem: SKIN/TISSUE INTEGRITY - ADULT  Goal: Skin integrity remains intact  Description: INTERVENTIONS  - Identify patients at risk for skin breakdown  - Assess and monitor skin integrity  - Assess and monitor nutrition and hydration status  - Monitor labs (i e  albumin)  - Assess for incontinence   - Turn and reposition patient  - Assist with mobility/ambulation  - Relieve pressure over bony prominences  - Avoid friction and shearing  - Provide appropriate hygiene as needed including keeping skin clean and dry  - Evaluate need for skin moisturizer/barrier cream  - Collaborate with interdisciplinary team (i e  Nutrition, Rehabilitation, etc )   - Patient/family teaching  Outcome: Progressing

## 2020-12-28 PROBLEM — R78.81 POSITIVE BLOOD CULTURE: Status: RESOLVED | Noted: 2020-12-14 | Resolved: 2020-12-28

## 2020-12-28 PROCEDURE — 97164 PT RE-EVAL EST PLAN CARE: CPT

## 2020-12-28 PROCEDURE — 97168 OT RE-EVAL EST PLAN CARE: CPT

## 2020-12-28 PROCEDURE — 99232 SBSQ HOSP IP/OBS MODERATE 35: CPT | Performed by: PHYSICIAN ASSISTANT

## 2020-12-28 RX ADMIN — MORPHINE SULFATE 5 MG: 100 SOLUTION ORAL at 08:20

## 2020-12-28 RX ADMIN — LORAZEPAM 0.5 MG: 0.5 TABLET ORAL at 00:44

## 2020-12-28 RX ADMIN — MORPHINE SULFATE 5 MG: 100 SOLUTION ORAL at 05:42

## 2020-12-28 RX ADMIN — MORPHINE SULFATE 5 MG: 100 SOLUTION ORAL at 14:29

## 2020-12-28 NOTE — UTILIZATION REVIEW
Continued Stay Review    Date: 12/28                 Current Patient Class: Inpatient  Current Level of Care: Med/surg    HPI:66 y o  male initially admitted on 12/10  Assessment/Plan:  12/28 Case management:  Will need to complete MA form prior to being accepted at a certain facility if a bed opens up  Available bed found, awaiting family permission to move forward with auth  Unable to get a hold of at the moment  Continue with comfort care  Eating and drinking without difficulty  12/27 Update: PUlse ox is variable  Unable to return home with at home hospice as there is no one to take care of him 24 hours a day  12/24 UPdate:  He does not want to go home with hospice services due to being COVID +  Does not want oxygen  Pulse ox 77-86% on room air  Family discussing home hospice as an option  C[facility] working on placement  12/23 Case management update:  Patient and family agreeable to SNF with hospice services  Referrals sent    Pertinent Labs/Diagnostic Results:       Results from last 7 days   Lab Units 12/22/20  0551   WBC Thousand/uL 11 05*   HEMOGLOBIN g/dL 11 1*   HEMATOCRIT % 32 5*   PLATELETS Thousands/uL 144*         Results from last 7 days   Lab Units 12/22/20  0550   SODIUM mmol/L 135*   POTASSIUM mmol/L 4 9   CHLORIDE mmol/L 102   CO2 mmol/L 24   ANION GAP mmol/L 9   BUN mg/dL 46*   CREATININE mg/dL 0 91   EGFR ml/min/1 73sq m 84   CALCIUM mg/dL 8 9   MAGNESIUM mg/dL 2 0   PHOSPHORUS mg/dL 3 7     Results from last 7 days   Lab Units 12/22/20  0550   AST U/L 50*   ALT U/L 93*   ALK PHOS U/L 98   TOTAL PROTEIN g/dL 5 8*   ALBUMIN g/dL 1 8*   TOTAL BILIRUBIN mg/dL 1 20*     Results from last 7 days   Lab Units 12/22/20  1539 12/22/20  1109 12/22/20  0711 12/22/20  0410 12/22/20  0246 12/22/20  0001 12/21/20  2217 12/21/20  2015 12/21/20  1813 12/21/20  1601 12/21/20  1419   POC GLUCOSE mg/dl 269* 297* 149* 106 66 251* 348* 241* 140 109 95     Results from last 7 days   Lab Units 12/22/20  0550   GLUCOSE RANDOM mg/dL 121     Results from last 7 days   Lab Units 12/22/20  0550   D-DIMER QUANTITATIVE ug/ml FEU 3 51*       Results from last 7 days   Lab Units 12/22/20  0551   FERRITIN ng/mL 1,070*     Results from last 7 days   Lab Units 12/22/20  0550   CRP mg/L 184 8*       Vital Signs:  Date/Time  Pulse  Resp  SpO2  Calculated FIO2 (%) - Nasal Cannula  Nasal Cannula O2 Flow Rate (L/min)  O2 Device   12/28/20 0746  96  32Abnormal   90 %  32  3 L/min  Nasal cannula       Medications:   Scheduled Medications:     Continuous IV Infusions:     PRN Meds:  haloperidol, 0 5 mg, Oral, Q6H PRN  LORazepam, 0 5 mg, Oral, Q6H PRN  Morphine Sulfate (Concentrate), 5 mg, Oral, Q1H PRN  ondansetron, 4 mg, Oral, Q6H PRN  phenol, 1 spray, Mouth/Throat, Q2H PRN  saliva substitute, 5 spray, Mouth/Throat, Q4H PRN        Discharge Plan: D  Network Utilization Review Department  ATTENTION: Please call with any questions or concerns to [phone] and carefully listen to the prompts so that you are directed to the right person  All voicemails are confidential   Marina Li all requests for admission clinical reviews, approved or denied determinations and any other requests to dedicated fax number below belonging to the campus where the patient is receiving treatment   List of dedicated fax numbers for the Facilities:  [address] DENIALS (Administrative/Medical Necessity) [phone]   [address] (Maternity/NICU/Pediatrics) [phone]   [address] Cathi Guadarrama 1279 (  [facility] Te[facility]eda [facility]) [address] [place] [address] [phone]     [address] [phone]   [place] [address] [phone]

## 2020-12-28 NOTE — PLAN OF CARE
Problem: PHYSICAL THERAPY ADULT  Goal: Performs mobility at highest level of function for planned discharge setting  See evaluation for individualized goals  Description: Treatment/Interventions: Functional transfer training, LE strengthening/ROM, Therapeutic exercise, Endurance training, Cognitive reorientation, Patient/family training, Bed mobility, Gait training, Spoke to nursing, OT          See flowsheet documentation for full assessment, interventions and recommendations  Note: Prognosis: Fair  Problem List: Decreased strength, Decreased endurance, Impaired balance, Decreased mobility, Decreased cognition, Decreased safety awareness  Assessment: Pt is 65 y o  male seen for PT re-evaluation on 12/28/2020 s/p admit to [facility] 12/[facility]/2020 w/ COVID-19 virus infection  PT consulted to assess pt's functional mobility and d/c needs  Order placed for PT re-eval and tx  Performed at least 2 patient identifiers during session: Name and wristband  Comorbidities affecting pt's physical performance at time of assessment include: essential hypertension, CVA, COPD, acute respiratory failure with hypoxia, acute metabolic encephalopathy, new onset atrial fibrillation, right subdural hematoma, and traumatic remote weakness of left upper extremity  PTA, pt was requiring A for mobility  Personal factors affecting pt at time of IE include: inaccessible home environment, inability to ambulate household distances, inability to navigate community distances, inability to navigate level surfaces without external assistance, unable to perform dynamic tasks in community, decreased cognition, limited home support, inability to perform IADLs and inability to perform ADLs   Please find objective findings from PT assessment regarding body systems outlined above with impairments and limitations including weakness, impaired balance, decreased endurance, decreased activity tolerance, decreased safety awareness, fall risk and decreased cognition, as well as mobility assessment (need for cueing for mobility technique)  The following objective measures performed on IE also reveal limitations: Barthel Index: 25/100 and Modified Marisol: 5 (severe disability)  Pt's clinical presentation is currently unstable/unpredictable seen in pt's presentation of abnormal lab value(s), need for input for task focus and mobility technique and ongoing medical assessment  Pt to benefit from continued PT tx to address deficits as defined above and maximize level of functional independent mobility and consistency  From PT/mobility standpoint, recommendation at time of d/c would be post acute rehab pending progress in order to facilitate return to PLOF  Barriers to Discharge: Inaccessible home environment, Decreased caregiver support     PT Discharge Recommendation: [address]     PT - OK to Discharge: Yes(when medically cleared, if to STR)    See flowsheet documentation for full assessment

## 2020-12-28 NOTE — CASE MANAGEMENT
CM spoke to Philippe on home number [phone] who states she is open to patient going to rehab in [facility] or [place]  CM informed Edi of IMM and IMM is in the chart  CM phoned and spoke to Dola [phone] for the [facility] where patient was previous to this hospitalization  Veda Matute states she will call back to inform when and if the [facility] will accept  Veda Matute states there is an extreme staff shortage at the [facility]  CM phoned and spoke to Philippe who states she is open to patient going back to the [facility] if that is the only facility who will accept patient  CM received a call from raquel at [facility] central intake -[phone] who states she has an accepting bed for patient at [facility]  They would like PT and OT notes for auth  CM notified HENRIETTA and Ruel Monge for therapy  CM to call Arian to inform of accepting facility [facility]  Treatment team informed

## 2020-12-28 NOTE — CASE MANAGEMENT
Kiesha for [facility] is currently on vacation until jan 4th  CM phoned [facility] intake [phone] and spoke to Raya Cook who states she will call this cm back on bed availability after their meeting to notify of acceptance  CM to follow up with family to inform of updates on acceptance

## 2020-12-28 NOTE — CASE MANAGEMENT
CM received a message from [place] [phone]  To confirm the [facility] at [place] is not able to accept patient  Currently only the [facility] has a bed for patient  CM awaits call back from rena Kerns to confirm to move forward with auth for patient to go to [facility]  Treatment team informed

## 2020-12-28 NOTE — CASE MANAGEMENT
CM phoned and spoke to hazel's son-Ivan who said his mother -Kaci Dayece is currently at a dentist appointment and he will have her call me when she gets back  This cm left phone number for hazel to call back  Treatment team informed

## 2020-12-28 NOTE — OCCUPATIONAL THERAPY NOTE
Occupational Therapy Re-evaluation      Sakina Human    12/28/2020    Principal Problem:    COVID-19 virus infection  Active Problems:    Essential hypertension    CVA (cerebral vascular accident) ([place] [address] )    COPD (chronic obstructive pulmonary disease) ([place] [address] )    Acute respiratory failure with hypoxia (HCC)    Acute metabolic encephalopathy    New onset a-fib ([place] [address] )    Positive blood culture    Right Subdural hematoma ([place] [address] ) 09/2020      Past Medical History:   Diagnosis Date    Abnormality of gait and mobility     JOSHUA (acute kidney injury) ([place] [address] ) 12/11/2020    GERD (gastroesophageal reflux disease)     Hyperglycemia 12/14/2020    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     Other hyperlipidemia 8/17/2020    Other mechanical complication of other specified internal prosthetic devices, implants and grafts, sequela     Recurrent depressive disorder ([place] [address] )     Wedge compression fracture of t11-T12 vertebra, initial encounter for closed fracture ([place] [address] )     Wheezing        No past surgical history on file  12/28/20 1213   Note Type   Note type Re-Evaluation   Restrictions/Precautions   Weight Bearing Precautions Per Order No   Braces or Orthoses   (unknown)   Other Precautions Cognitive; Bed Alarm; Fall Risk;Contact/isolation; Airborne/isolation;O2  (+COVID19 confirmed)   Pain Assessment   Pain Assessment Tool Pain Assessment not indicated - pt denies pain   Pain Score No Pain   Home Living   Type of Home Apartment  (senior housing)   Home Layout One level  (No MANNY)   Additional Comments Obtained from CM note   Prior Function   Level of [place] Needs assistance with ADLs and functional mobility   Lives With Alone   Receives Help From Family   ADL Assistance Needs assistance   IADLs Needs assistance   Falls in the last 6 months   (unknown)   Lifestyle   Autonomy Patient unable to provide complete history   Chart review indicates- CM Management Note - obtained  from patient's sister Darshana Jackman- " patient lives alone in his senior apartment  Patient recently came home from being at the [employer] in [place] for five weeks  Patient recently came home on Wednesday prior to this hospitalization  Carlos Kiran states patient is too weak to do anything for himself at home  There are no stairs to enter from outside "   Reciprocal Relationships Supportive sister   Psychosocial   Psychosocial (WDL) X   Patient Behaviors/Mood Flat affect   ADL   Eating Assistance 4  Minimal Assistance   Eating Deficit Setup;Verbal cueing;Supervision/safety; Increased time to complete   Grooming Assistance 2  Maximal Assistance   Grooming Deficit Setup;Verbal cueing;Supervision/safety; Increased time to complete   UB Bathing Assistance 2  Maximal Assistance   UB Bathing Deficit Setup;Verbal cueing;Supervision/safety; Increased time to complete   LB Bathing Assistance 1  Total Assistance   LB Bathing Deficit Setup;Verbal cueing;Supervision/safety; Increased time to complete   UB Dressing Assistance 2  Maximal Assistance   UB Dressing Deficit Setup;Verbal cueing;Supervision/safety; Increased time to complete   LB Dressing Assistance 1  Total Assistance   LB Dressing Deficit Setup;Verbal cueing;Supervision/safety; Increased time to complete   Toileting Assistance  1  Total Assistance   Toileting Deficit Setup;Steadying;Verbal cueing;Supervison/safety; Increased time to complete   Functional Assistance 1  Total Assistance   Functional Deficit Setup;Verbal cueing;Supervision/safety; Increased time to complete   Bed Mobility   Rolling R 3  Moderate assistance   Additional items Assist x 2;HOB elevated; Bedrails; Increased time required;Verbal cues;LE management   Rolling L 3  Moderate assistance   Additional items Assist x 2;HOB elevated; Bedrails; Increased time required;Verbal cues;LE management   Activity Tolerance   Activity Tolerance Patient limited by fatigue   Medical Staff Made Aware PT [address] [facility]1 Pt's RN stated pt was appropriate for OT and made aware of otucomes   RUE Assessment   RUE Assessment X  (limited functional use of RUE)   RUE Strength   RUE Overall Strength Deficits  (3+/5)   LUE Assessment   LUE Assessment X  (non functional use of LUE/ left da/ flaccid?)   Hand Function   Gross Motor Coordination Impaired   Fine Motor Coordination Impaired   Sensation   Light Touch Partial deficits in the LUE   Vision-Basic Assessment   Current Vision Does not wear glasses   Cognition   Overall Cognitive Status Impaired   Arousal/Participation Alert;Arousable   Attention Difficulty attending to directions   Orientation Level Disoriented X4   Memory Decreased recall of precautions;Decreased recall of recent events;Decreased short term memory   Following Commands Follows one step commands with increased time or repetition   Comments Pt was agreeable to OT session   Assessment   Limitation Decreased ADL status; Decreased UE ROM; Decreased UE strength;Decreased Safe judgement during ADL;Decreased cognition;Decreased endurance;Decreased self-care trans;Decreased fine motor control;Decreased high-level ADLs; Non-func L UE;Mood limitation   Prognosis Fair   Assessment Pt is a 65 y o  male seen for OT re-evaluation s/p admit to [facility] on 12/10/2020 w/ COVID-19 virus infection  Comorbidities affecting pt's functional performance at time of assessment include: Acute metabolic encephalopathy, acute respiratory failure with hypoxia, COPD, CVA  With Left sided weakness, essential hypertension  Orders placed for OT re-evaluation  Performed at least two patient identifiers during session including name and wristband  Personal factors affecting pt at time of re-eval include:limited home support, behavioral pattern, difficulty performing ADLS, difficulty performing IADLS , compliance, flat affect, decreased initiation and engagement , health management  and environment  Prior to admission, pt reports A with ADLs, A with IADLs, and (-) driving    Upon evaluation: Pt requires max A with UB ADLs, total A with LB ADLs, deferred xfers at this time due to decreased safety  Limitations 2* the following deficits impacting occupational performance: weakness, decreased strength, decreased dynamic sit/ stand balance, decreased activity tolerance, decreased standing tolerance time for self care and functional mobility, decreased postural control, impaired attention, impaired problem solving, decreased safety awareness, impaired interpersonal skills, environmental deficits, decreased coping skills, decreased mobilty and requiring external assistance to complete transitional movements  Pt to benefit from continued skilled OT tx while in the hospital to address deficits as defined above and maximize level of functional independence w ADL's and functional mobility  Occupational Performance areas to address include: bathing/shower, toilet hygiene, dressing, medication management, socialization, health maintenance, functional mobility, community mobility, clothing management, cleaning, meal prep, money management and household maintenance  From OT standpoint, recommendation at time of d/c would be post acute rehab  Plan   Treatment Interventions ADL retraining;Functional transfer training;UE strengthening/ROM; Endurance training; Activityengagement; Energy conservation;Cardiac education;Continued evaluation; Compensatory technique education;Equipment evaluation/education;Patient/family training;Cognitive reorientation   Goal Expiration Date 01/10/21   OT Frequency 3-5x/wk   Recommendation   OT Discharge Recommendation Post-Acute Rehabilitation Services   OT - OK to Discharge Yes  (when medically cleared)   AM-PAC Daily Activity Inpatient   Lower Body Dressing 1   Bathing 1   Toileting 1   Upper Body Dressing 2   Grooming 2   Eating 2   Daily Activity Raw Score 9   Barthel Index   Feeding 5   Bathing 0   Grooming Score 0   Dressing Score 0   Bladder Score 5   Bowels Score 5 Toilet Use Score 5   Transfers (Bed/Chair) Score 5   Mobility (Level Surface) Score 0   Stairs Score 0   Barthel Index Score 25   Modified [place] Scale   Modified Marisol Scale 4     Occupational Therapy goals: In 7-14 days:    Patient will verbalize and demonstrate use of energy conservation/ deep breathing technique and work simplification skills during functional activity with no verbal cues  Patient will verbalize and demonstrate good body mechanics and joint protection techniques during  ADLs/ IADLs with no verbal cues  Patient will increase OOB/ sitting tolerance to 2-4 hours per day for increased participation in self care and leisure tasks with no s/s of exertion  Patient will increase standing tolerance time to 5  minutes with unilateral UE support to complete sink level ADLs@ mod I level  Patient will increase sitting tolerance at edge of bed to 20 minutes to complete UB ADLs @ set up assist level  Patient will transfer bed to Chair / toilet at Set up assist level with AD as indicated  Patient will complete UB ADLs with set up assist     Patient will complete LB ADLs with min assist with the use of adaptive equipment  Patient will complete toileting hygiene with set up assist/ supervision for thoroughness      Patient/ Family  will demonstrate competency with UE Home Exercise Program     Otis Watt MS OTR/L

## 2020-12-28 NOTE — CASE MANAGEMENT
CM returned Arian's phone call to inform [facility] will accept  Abby Chase states she will talk to her family and get back to this cm on what they have decided  Treatment team informed

## 2020-12-28 NOTE — CASE MANAGEMENT
CM spoke to [facility] -admissions director at [facility] who states they are bedlocked and are not accepting patients at this time  [facility] states although patient is COVID positive they would have been open to accepting patient because patient has to be 10 post positive test for them to accept  [facility] states if anything changes about open beds she will let this cm know  [facility] also states patient will need to complete MA application to be accepted into [facility]  ALYSSA to follow up with family about another facility to accepting patient

## 2020-12-28 NOTE — PHYSICAL THERAPY NOTE
Physical Therapy Evaluation     Patient's Name: Santana Francisco    Admitting Diagnosis  Difficulty walking [R26 2]  Hypoxia [R09 02]  Sepsis, due to unspecified organism, unspecified whether acute organ dysfunction present ([place] [address] ) [A41 9]  Pneumonia due to COVID-19 virus [U07 1, J12 89]    Problem List  Patient Active Problem List   Diagnosis    Other insomnia    Essential hypertension    Slow transit constipation    Peripheral neuropathy    DVT prophylaxis    COVID-19 virus infection    CVA (cerebral vascular accident) ([place] [address] )    COPD (chronic obstructive pulmonary disease) ([place] [address] )    Acute respiratory failure with hypoxia (HCC)    Acute metabolic encephalopathy    New onset a-fib ([place] [address] )    Positive blood culture    Right Subdural hematoma ([place] [address] ) 09/2020       Past Medical History  Past Medical History:   Diagnosis Date    Abnormality of gait and mobility     JOSHUA (acute kidney injury) ([place] [address] ) 12/11/2020    GERD (gastroesophageal reflux disease)     Hyperglycemia 12/14/2020    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     Other hyperlipidemia 8/17/2020    Other mechanical complication of other specified internal prosthetic devices, implants and grafts, sequela     Recurrent depressive disorder ([place] [address] )     Wedge compression fracture of t11-T12 vertebra, initial encounter for closed fracture ([place] [address] )     Wheezing        Past Surgical History  No past surgical history on file           12/28/20 1214   PT Last Visit   PT Visit Date 12/28/20   Note Type   Note type Re-Evaluation   Pain Assessment   Pain Assessment Tool Pain Assessment not indicated - pt denies pain   Pain Score No Pain   Home Living   Type of Home Apartment  (senior housing)   Home Layout One level  (0 MANNY)   Additional Comments Obtained from CM note   Prior Function   Level of [place] Needs assistance with ADLs and functional mobility   Lives With Alone   Receives Help From Family   ADL Assistance Needs assistance   IADLs Needs assistance   Falls in the last 6 months   (unknown)   Comments Patient unable to provide complete history  Chart review indicates- CM Management Note - obtained  from patient's sister Juan M Lechuga- " patient lives alone in his senior apartment  Patient recently came home from being at [facility] [employer] in [place] for five weeks  Patient recently came home on Wednesday prior to this hospitalization  Juan M Lechuga states patient is too weak to do anything for himself at home  There are no stairs to enter from outside "   Restrictions/Precautions   Weight Bearing Precautions Per Order No   Braces or Orthoses   (unknown)   Other Precautions Cognitive; Bed Alarm; Fall Risk;Contact/isolation; Airborne/isolation;O2  (+COVID19 confirmed)   General   Family/Caregiver Present No   Cognition   Overall Cognitive Status Impaired   Arousal/Participation Alert   Attention Difficulty attending to directions   Orientation Level Disoriented X4   Memory Decreased recall of precautions;Decreased recall of recent events;Decreased short term memory   Following Commands Follows one step commands with increased time or repetition   Comments pt agreeable to PT   RUE Assessment   RUE Assessment   (defer to OT eval for comments)   LUE Assessment   LUE Assessment   (defer to OT eval for comments)   RLE Assessment   RLE Assessment X   Strength RLE   RLE Overall Strength   (grossly 3-/5)   Strength LLE   LLE Overall Strength   (grossly 2/5)   Coordination   Movements are Fluid and Coordinated   (unable to formally assess)   Bed Mobility   Rolling R 3  Moderate assistance   Additional items Assist x 2;HOB elevated; Bedrails; Increased time required;Verbal cues;LE management   Rolling L 3  Moderate assistance   Additional items Assist x 2;HOB elevated; Bedrails; Increased time required;Verbal cues;LE management   Endurance Deficit   Endurance Deficit Yes   Activity Tolerance   Activity Tolerance Patient limited by fatigue   Medical Staff Made Aware ZHANE [place] Nurse Made Aware RN Shannon Barajas verbalized pt appropriate to see, made aware of session outcome/recs   Assessment   Prognosis Fair   Problem List Decreased strength;Decreased endurance; Impaired balance;Decreased mobility; Decreased cognition;Decreased safety awareness   Assessment Pt is 65 y o  male seen for PT re-evaluation on 12/28/2020 s/p admit to [facility] on 12/10/2020 w/ COVID-19 virus infection  PT consulted to assess pt's functional mobility and d/c needs  Order placed for PT re-eval and tx  Performed at least 2 patient identifiers during session: Name and wristband  Comorbidities affecting pt's physical performance at time of assessment include: essential hypertension, CVA, COPD, acute respiratory failure with hypoxia, acute metabolic encephalopathy, new onset atrial fibrillation, right subdural hematoma, and traumatic remote weakness of left upper extremity  PTA, pt was requiring A for mobility  Personal factors affecting pt at time of IE include: inaccessible home environment, inability to ambulate household distances, inability to navigate community distances, inability to navigate level surfaces without external assistance, unable to perform dynamic tasks in community, decreased cognition, limited home support, inability to perform IADLs and inability to perform ADLs  Please find objective findings from PT assessment regarding body systems outlined above with impairments and limitations including weakness, impaired balance, decreased endurance, decreased activity tolerance, decreased safety awareness, fall risk and decreased cognition, as well as mobility assessment (need for cueing for mobility technique)  The following objective measures performed on IE also reveal limitations: Barthel Index: 25/100 and Modified [place]: 5 (severe disability)   Pt's clinical presentation is currently unstable/unpredictable seen in pt's presentation of abnormal lab value(s), need for input for task focus and mobility technique and ongoing medical assessment  Pt to benefit from continued PT tx to address deficits as defined above and maximize level of functional independent mobility and consistency  From PT/mobility standpoint, recommendation at time of d/c would be post acute rehab pending progress in order to facilitate return to PLOF  Barriers to Discharge Inaccessible home environment;Decreased caregiver support   Goals   Patient Goals none expressed   STG Expiration Date 01/07/21   Short Term Goal #1 In 7-10 days: Increase bilateral LE strength 1/2 grade to facilitate independent mobility, Perform all bed mobility tasks with min A of 1 to decrease caregiver burden, Improve Barthel Index score to 40 or greater to facilitate independence and PT to see and establish goals for transfers, ambulation, sitting & standing balance when appropriate   PT Treatment Day 0   Plan   Treatment/Interventions LE strengthening/ROM; Therapeutic exercise; Endurance training;Patient/family training;Equipment eval/education; Bed mobility;Spoke to nursing;Continued evaluation;Spoke to case management;OT   PT Frequency   (3-5x/wk)   Recommendation   PT Discharge Recommendation Post-Acute Rehabilitation Services   Equipment Recommended   (TBD)   PT - OK to Discharge Yes  (when medically cleared, if to STR)   Modified [place] Scale   Modified [place] Scale 5   Barthel Index   Feeding 5   Bathing 0   Grooming Score 0   Dressing Score 0   Bladder Score 5   Bowels Score 5   Toilet Use Score 5   Transfers (Bed/Chair) Score 5   Mobility (Level Surface) Score 0   Stairs Score 0   Barthel Index Score 25       Byron Jang, PT

## 2020-12-28 NOTE — PLAN OF CARE
Problem: OCCUPATIONAL THERAPY ADULT  Goal: Performs self-care activities at highest level of function for planned discharge setting  See evaluation for individualized goals  Description: Treatment Interventions: ADL retraining, Functional transfer training, UE strengthening/ROM, Endurance training, Cognitive reorientation, Patient/family training, Equipment evaluation/education, Compensatory technique education, Continued evaluation, Energy conservation, Activityengagement          See flowsheet documentation for full assessment, interventions and recommendations  Note: Limitation: Decreased ADL status, Decreased UE ROM, Decreased UE strength, Decreased Safe judgement during ADL, Decreased cognition, Decreased endurance, Decreased self-care trans, Decreased fine motor control, Decreased high-level ADLs, Non-func L UE, Mood limitation  Prognosis: Fair  Assessment: Pt is a 65 y o  male seen for OT re-evaluation s/p admit to [facility] on 12/10/2020 w/ COVID-19 virus infection  Comorbidities affecting pt's functional performance at time of assessment include: Acute metabolic encephalopathy, acute respiratory failure with hypoxia, COPD, CVA  With Left sided weakness, essential hypertension  Orders placed for OT re-evaluation  Performed at least two patient identifiers during session including name and wristband  Personal factors affecting pt at time of re-eval include:limited home support, behavioral pattern, difficulty performing ADLS, difficulty performing IADLS , compliance, flat affect, decreased initiation and engagement , health management  and environment  Prior to admission, pt reports A with ADLs, A with IADLs, and (-) driving  Upon evaluation: Pt requires max A with UB ADLs, total A with LB ADLs, deferred xfers at this time due to decreased safety   Limitations 2* the following deficits impacting occupational performance: weakness, decreased strength, decreased dynamic sit/ stand balance, decreased activity tolerance, decreased standing tolerance time for self care and functional mobility, decreased postural control, impaired attention, impaired problem solving, decreased safety awareness, impaired interpersonal skills, environmental deficits, decreased coping skills, decreased mobilty and requiring external assistance to complete transitional movements  Pt to benefit from continued skilled OT tx while in the hospital to address deficits as defined above and maximize level of functional independence w ADL's and functional mobility  Occupational Performance areas to address include: bathing/shower, toilet hygiene, dressing, medication management, socialization, health maintenance, functional mobility, community mobility, clothing management, cleaning, meal prep, money management and household maintenance  From OT standpoint, recommendation at time of d/c would be post acute rehab         OT Discharge Recommendation: Post-Acute Rehabilitation Services  OT - OK to Discharge: Yes(when medically cleared)

## 2020-12-29 PROBLEM — E11.9 DIABETES MELLITUS (HCC): Status: ACTIVE | Noted: 2020-12-29

## 2020-12-29 PROCEDURE — 99232 SBSQ HOSP IP/OBS MODERATE 35: CPT | Performed by: INTERNAL MEDICINE

## 2020-12-29 PROCEDURE — 99232 SBSQ HOSP IP/OBS MODERATE 35: CPT | Performed by: PHYSICIAN ASSISTANT

## 2020-12-29 RX ORDER — HALOPERIDOL 5 MG/ML
0.5 INJECTION INTRAMUSCULAR EVERY 6 HOURS PRN
Status: DISCONTINUED | OUTPATIENT
Start: 2020-12-29 | End: 2020-12-29

## 2020-12-29 RX ORDER — MORPHINE SULFATE 100 MG/5ML
5 SOLUTION, CONCENTRATE ORAL EVERY 2 HOUR PRN
Status: DISCONTINUED | OUTPATIENT
Start: 2020-12-29 | End: 2020-12-31 | Stop reason: HOSPADM

## 2020-12-29 RX ORDER — ONDANSETRON 2 MG/ML
4 INJECTION INTRAMUSCULAR; INTRAVENOUS EVERY 6 HOURS PRN
Status: DISCONTINUED | OUTPATIENT
Start: 2020-12-29 | End: 2020-12-31 | Stop reason: HOSPADM

## 2020-12-29 RX ORDER — LORAZEPAM 2 MG/ML
0.5 INJECTION INTRAMUSCULAR EVERY 4 HOURS PRN
Status: DISCONTINUED | OUTPATIENT
Start: 2020-12-29 | End: 2020-12-29

## 2020-12-29 RX ORDER — DILTIAZEM HYDROCHLORIDE 120 MG/1
120 CAPSULE, COATED, EXTENDED RELEASE ORAL DAILY
Status: DISCONTINUED | OUTPATIENT
Start: 2020-12-30 | End: 2020-12-31 | Stop reason: HOSPADM

## 2020-12-29 RX ORDER — ASPIRIN 325 MG
325 TABLET ORAL DAILY
Status: DISCONTINUED | OUTPATIENT
Start: 2020-12-30 | End: 2020-12-31 | Stop reason: HOSPADM

## 2020-12-29 RX ORDER — LORAZEPAM 0.5 MG/1
0.5 TABLET ORAL EVERY 4 HOURS
Status: DISCONTINUED | OUTPATIENT
Start: 2020-12-29 | End: 2020-12-30

## 2020-12-29 RX ADMIN — MORPHINE SULFATE 2 MG: 2 INJECTION, SOLUTION INTRAMUSCULAR; INTRAVENOUS at 00:58

## 2020-12-29 RX ADMIN — MORPHINE SULFATE 2 MG: 2 INJECTION, SOLUTION INTRAMUSCULAR; INTRAVENOUS at 07:18

## 2020-12-29 RX ADMIN — LORAZEPAM 0.5 MG: 0.5 TABLET ORAL at 16:51

## 2020-12-29 RX ADMIN — LORAZEPAM 0.5 MG: 0.5 TABLET ORAL at 20:50

## 2020-12-29 RX ADMIN — LORAZEPAM 0.5 MG: 0.5 TABLET ORAL at 12:12

## 2020-12-29 NOTE — CASE MANAGEMENT
CM phoned and spoke to benignokatie Dianne Ashlie Juarez who states she and the family are agreeable to patient going to [facility]  CM phoned central intake at [facility] [phone] and spoke to Rhea Reddy who states raquel is currently on the phone and she will call this cm to discuss acceptance to [facility]  Treatment team informed

## 2020-12-29 NOTE — QUICK NOTE
Notified by RN pt currently lethargic and unable to accept PO meds and is tachypneic with labored breathing  2/2 more harm sticking multiple times with IM meds compared to IV access for meds and sticking once for IV cand benefit of prn meds for comfort care, ordered for maintaining IV access and insert   D/c po meds for now and ordered prn IV ativan, haldol, morphine and zofran

## 2020-12-29 NOTE — PROGRESS NOTES
Progress Note Loretta Cisneros 1950, 65 y o  male MRN: [id number]    Unit/Bed#: -[facility] Encounter: [id number]    Primary Care Provider: Kiara Tyson MD   Date and time admitted to hospital: 12/10/2020 12:59 PM      DOS: 12/28/2020    * COVID-19 virus infection  Assessment & Plan  · Pt presented with generalized weakness and increased confusion, required mid flow and high-flow and was transferred to the ICU  He does not want intubation, and a conversation was held with his family regarding goals of care  They wish to make him comfort, he was transferred back out of the ICU to medical-surgical floor  · Patient was coherent this morning, appears to be distressed and tachypnic    Nurse gave morphine for comfort and will continue to monitor  · Palliative care following, appreciate follow-up recommendations  · Comfort measures in place only at this time, pt continues to appear to be stable enough for transfer, CM following for transfer to SNF for hospice services  · Continue PRN ativan and morphine for comfort currently    New onset a-fib [facility])  Assessment & Plan  · Notable for new onset afib this hospitalization  · Most recent pulse check of 100   · Not maintained on any rate controlling medications or a/c in setting of comfort care status    Acute metabolic encephalopathy  Assessment & Plan  · Likely secondary to COVID-19 infection and severe hypoxia  · Patient with waxing and waning mentation   · Appears to be fairly alert today     Acute respiratory failure with hypoxia ([facility] )  Assessment & Plan  · Supplemental O2 for comfort  · Currently on 2-3 L, 93%, patient does remove his oxygen drops into the high 70s mid 80s  · Likely in setting of COVID infection and underlying COPD    COPD (chronic obstructive pulmonary disease) ([facility] [address] )  Assessment & Plan  · Not typically maintained on supplemental O2 as an outpatient   · Currently on 2-3 L NC here, however pt continues to rip off supplemental O2  · Continue PRN supplemental oxygen for comfort    CVA (cerebral vascular accident) [facility])  Assessment & Plan  · Pt with recent hx of stroke a few months ago followed by [facility] thought to be traumatic in setting of falls at home   · Anticoagulation discontinued, comfort measures only    Essential hypertension  Assessment & Plan  · Noted to have hypotension on admission, vital signs are not being routinely monitored in the setting of comfort measures only  · Not currently maintained on any medications at this time    Positive blood culture-resolved as of 12/28/2020  Assessment & Plan  · 1/2 BC (+) for staph coag negative   · Likely contaminant   · No additional inpatient recommendations at this time      VTE Pharmacologic Prophylaxis:   Pharmacologic: comfort measures only  Mechanical VTE Prophylaxis in Place: No    Patient Centered Rounds: I have evaluated patient without nursing staff present due to speaking to nurse outside patient's room in setting of COVID precautions     Discussions with Specialists or Other Care Team Provider:  Discussed with RN, alfred and reviewed previous notes    Education and Discussions with Family / Patient:  Discussed with patient at bedside as well as patient's niece over the phone regarding plan of care    Time Spent for Care: 20 minutes  More than 50% of total time spent on counseling and coordination of care as described above  Current Length of Stay: 18 day(s)    Current Patient Status: Inpatient   Certification Statement: The patient will continue to require additional inpatient hospital stay due to comfort measures, awaiting placement    Discharge Plan: Awaiting safe discharge placement     Code Status: Level 4 - Comfort Care      Subjective:   Pt reports that he does not want the oxygen on at this time  He does endorse some shortness of breath  Objective:     Vitals:   No data recorded  HR:  [] 100  Resp:  [24-32] 24  SpO2:  [90 %-92 %] 92 %  Body mass index is 20 89 kg/m²       Input and Output Summary (last 24 hours): Intake/Output Summary (Last 24 hours) at 12/28/2020 1904  Last data filed at 12/27/2020 2028  Gross per 24 hour   Intake 50 ml   Output --   Net 50 ml       Physical Exam:     Physical Exam  Vitals signs reviewed  Constitutional:       General: He is not in acute distress  Comments: Pt is in no acute distress, does appear slightly uncomfortable sitting up in bed  Cachectic and ill appearing  Tachypnea    HENT:      Head: Normocephalic and atraumatic  Cardiovascular:      Rate and Rhythm: Tachycardia present  Rhythm irregular  Pulmonary:      Comments: Diminished breath sounds bilaterally   Abdominal:      General: Bowel sounds are normal  There is no distension  Palpations: Abdomen is soft  Tenderness: There is no abdominal tenderness  Musculoskeletal:      Right lower leg: No edema  Left lower leg: No edema  Skin:     General: Skin is warm and dry  Findings: No erythema  Neurological:      Mental Status: He is alert  He is disoriented  Additional Data:     Labs:    Results from last 7 days   Lab Units 12/22/20  0551   WBC Thousand/uL 11 05*   HEMOGLOBIN g/dL 11 1*   HEMATOCRIT % 32 5*   PLATELETS Thousands/uL 144*     Results from last 7 days   Lab Units 12/22/20  0550   POTASSIUM mmol/L 4 9   CHLORIDE mmol/L 102   CO2 mmol/L 24   BUN mg/dL 46*   CREATININE mg/dL 0 91   CALCIUM mg/dL 8 9   ALK PHOS U/L 98   ALT U/L 93*   AST U/L 50*           * I Have Reviewed All Lab Data Listed Above  * Additional Pertinent Lab Tests Reviewed:  All Labs Within Last 24 Hours Reviewed    Imaging:    Imaging Reports Reviewed Today Include: CXR  Imaging Personally Reviewed by Myself Includes:  None    Recent Cultures (last 7 days):           Last 24 Hours Medication List:   Current Facility-Administered Medications   Medication Dose Route Frequency Provider Last Rate    haloperidol  0 5 mg Oral Q6H PRN Steve Acevedo MD      LORazepam  0 5 mg Oral Q6H PRN Jennifer Castano MD      Morphine Sulfate (Concentrate)  5 mg Oral Q1H PRN Jennifer Castano MD      ondansetron  4 mg Oral Q6H PRN Jennifer Castano MD      phenol  1 spray Mouth/Throat Q2H PRN Cecelia Brewster PA-C      saliva substitute  5 spray Mouth/Throat Q4H PRN Princess Stefany PA-C          Today, Patient Was Seen By: Sergio Mike PA-C    ** Please Note: Dictation voice to text software may have been used in the creation of this document   **

## 2020-12-29 NOTE — ASSESSMENT & PLAN NOTE
· Pt presented with generalized weakness and increased confusion, required mid flow and high-flow and was transferred to the ICU  He did not want intubation, and a conversation was held with his family regarding goals of care  They wish to make him comfort, he was transferred back out of the ICU to medical-surgical floor  · Pt continues to be alert, however tachypnic and coarse breath sounds noted   However upon improvement of his acute hypoxia, pt was transitioned back to level 3 currently and plan to d/c to SNF and possible hospice from there  · Continue PRN morphine and ativan for pt comfort, however will resume some of his cardiac medications as below   · Palliative care following, appreciate follow-up recommendations  · No longer strictly comfort care while hospitalized   · Note overnight on 12/28 did state that pt was lethargic and therefore comfort meds were changed to IV, however today pt's mentation is better, able to take in PO meds and food, however has been refusing some food here

## 2020-12-29 NOTE — ASSESSMENT & PLAN NOTE
· Pt presented with generalized weakness and increased confusion, required mid flow and high-flow and was transferred to the ICU  He does not want intubation, and a conversation was held with his family regarding goals of care  They wish to make him comfort, he was transferred back out of the ICU to medical-surgical floor  · Patient was coherent this morning, appears to be distressed and tachypnic    Nurse gave morphine for comfort and will continue to monitor  · Palliative care following, appreciate follow-up recommendations  · Comfort measures in place only at this time, pt continues to appear to be stable enough for transfer, CM following for transfer to SNF for hospice services  · Continue PRN ativan and morphine for comfort currently

## 2020-12-29 NOTE — ASSESSMENT & PLAN NOTE
· Not typically maintained on supplemental O2 as an outpatient   · Currently on 2-3 L NC here, however pt continues to rip off supplemental O2  · Continue PRN supplemental oxygen for comfort

## 2020-12-29 NOTE — ASSESSMENT & PLAN NOTE
· 1/2 BC (+) for staph coag negative   · Likely contaminant   · No additional inpatient recommendations at this time

## 2020-12-29 NOTE — ASSESSMENT & PLAN NOTE
· Likely secondary to COVID-19 infection and severe hypoxia  · Patient with waxing and waning mentation   · Appears to be fairly alert today

## 2020-12-29 NOTE — ASSESSMENT & PLAN NOTE
· Supplemental O2 for comfort  · Currently on 2-3 L, 93%, patient does remove his oxygen drops into the high 70s mid 80s  · Likely in setting of COVID infection and underlying COPD

## 2020-12-29 NOTE — ASSESSMENT & PLAN NOTE
· Notable for new onset afib this hospitalization  · Most recent pulse check of 100   · Not maintained on any rate controlling medications or a/c in setting of comfort care status

## 2020-12-29 NOTE — PROGRESS NOTES
Progress note - Palliative and Supportive Care   Kel Torres 65 y o  male [id number]    Assessment:      Patient Active Problem List   Diagnosis    Other insomnia    Essential hypertension    Slow transit constipation    Peripheral neuropathy    DVT prophylaxis    COVID-19 virus infection    [facility]cere[facility]    COPD (chronic obstructive pulmonary disease) [facility]    Acute respiratory failure with hypoxia (HCC)    Acute metabolic encephalopathy    New onset a-fib ([facility] )    Right Subdural hematoma ([facility] ) 09/2020         Plan:  1  Symptom management -    - Will reorder oral roxanol 5mg PRN for air hunger if able to take PO   - Continue PRN ativan 0 5mg        2  Goals - PT/OT to evaluate for possible placement  Spoke with niece today  She is agreeable to placement in SNF  She is aware that he will continue to qualify for hospice at any point  Will change code status to level 3 as patient is no longer strictly comfort care, though comfort-focused care is still a priority  Code Status: level 3       Interval history:       Patient was lethargic and tachypneic overnight  Patient recieved 3 doses IV morphine for air hunger  This morning he is alert and dysarthric, answering simple y/n questions  Denies pain  Denies SOB though clearly working to breath while conversing  No nausea  Feels thirsty        MEDICATIONS / ALLERGIES:     all current active meds have been reviewed and current meds:   Current Facility-Administered Medications   Medication Dose Route Frequency    LORazepam (ATIVAN) tablet 0 5 mg  0 5 mg Oral Q4H    morphine injection 2 mg  2 mg Intravenous Q1H PRN    Morphine Sulfate (Concentrate) oral concentrated solution 5 mg  5 mg Oral Q2H PRN    ondansetron (ZOFRAN) injection 4 mg  4 mg Intravenous Q6H PRN    phenol (CHLORASEPTIC) 1 4 % mucosal liquid 1 spray  1 spray Mouth/Throat Q2H PRN    saliva substitute (MOUTH KOTE) mucosal solution 5 spray  5 spray Mouth/Throat Q4H PRN       No Known Allergies    OBJECTIVE:    Physical Exam  Physical Exam  Vitals signs and nursing note reviewed  Constitutional:       General: He is not in acute distress  Appearance: He is ill-appearing  He is not toxic-appearing  HENT:      Head: Atraumatic  Right Ear: External ear normal       Left Ear: External ear normal       Nose: Nose normal       Comments: O2 via NC  Eyes:      General:         Right eye: No discharge  Left eye: No discharge  Cardiovascular:      Rate and Rhythm: Normal rate  Pulmonary:      Effort: No respiratory distress  Comments: Tachypnea, labored  Musculoskeletal:         General: No deformity  Skin:     General: Skin is warm and dry  Neurological:      Mental Status: He is alert  Comments: Awake and alert, answers y/n questions, not overly participative in conversation   Psychiatric:      Comments: pleasant         Lab Results: I have personally reviewed pertinent labs  , CBC: No results found for: WBC, HGB, HCT, MCV, PLT, ADJUSTEDWBC, MCH, MCHC, RDW, MPV, NRBC, CMP: No results found for: SODIUM, K, CL, CO2, ANIONGAP, BUN, CREATININE, GLUCOSE, CALCIUM, AST, ALT, ALKPHOS, PROT, BILITOT, EGFR, BMP:No results found for: SODIUM, K, CL, CO2, BUN, CREATININE, GLUC, GLUF, CALCIUM, AGAP, EGFR      Counseling / Coordination of Care    Total floor / unit time spent today 25+ minutes  Greater than 50% of total time was spent with the patient and / or family counseling and / or coordination of care  A description of the counseling / coordination of care: complex symptom mgmt with opioids/benzos, goals of care and follow up conversation wiht niece, change in code status

## 2020-12-29 NOTE — ASSESSMENT & PLAN NOTE
· Noted to have hypotension on admission, vital signs are not being routinely monitored in the setting of comfort measures only  · Not currently maintained on any medications at this time

## 2020-12-29 NOTE — CASE MANAGEMENT
Karuna White called this CM to confirm she has accepted patient and will initiate auth for [facility]  CM awaits auth    Treatment team informed

## 2020-12-30 LAB
GLUCOSE SERPL-MCNC: 131 MG/DL (ref 65–140)
GLUCOSE SERPL-MCNC: 152 MG/DL (ref 65–140)
GLUCOSE SERPL-MCNC: 157 MG/DL (ref 65–140)
GLUCOSE SERPL-MCNC: 193 MG/DL (ref 65–140)

## 2020-12-30 PROCEDURE — 82948 REAGENT STRIP/BLOOD GLUCOSE: CPT

## 2020-12-30 PROCEDURE — 99232 SBSQ HOSP IP/OBS MODERATE 35: CPT | Performed by: NURSE PRACTITIONER

## 2020-12-30 PROCEDURE — 92610 EVALUATE SWALLOWING FUNCTION: CPT

## 2020-12-30 RX ORDER — ACETAMINOPHEN 325 MG/1
650 TABLET ORAL EVERY 6 HOURS PRN
Status: DISCONTINUED | OUTPATIENT
Start: 2020-12-30 | End: 2020-12-31 | Stop reason: HOSPADM

## 2020-12-30 RX ORDER — LORAZEPAM 0.5 MG/1
0.5 TABLET ORAL EVERY 4 HOURS
Status: DISCONTINUED | OUTPATIENT
Start: 2020-12-30 | End: 2020-12-31 | Stop reason: HOSPADM

## 2020-12-30 RX ADMIN — LORAZEPAM 0.5 MG: 0.5 TABLET ORAL at 16:31

## 2020-12-30 RX ADMIN — LORAZEPAM 0.5 MG: 0.5 TABLET ORAL at 12:00

## 2020-12-30 RX ADMIN — LORAZEPAM 0.5 MG: 0.5 TABLET ORAL at 01:20

## 2020-12-30 RX ADMIN — ASPIRIN 325 MG ORAL TABLET 325 MG: 325 PILL ORAL at 11:46

## 2020-12-30 RX ADMIN — LORAZEPAM 0.5 MG: 0.5 TABLET ORAL at 05:10

## 2020-12-30 RX ADMIN — DILTIAZEM HYDROCHLORIDE 120 MG: 120 CAPSULE, COATED, EXTENDED RELEASE ORAL at 11:58

## 2020-12-30 RX ADMIN — ACETAMINOPHEN 650 MG: 325 TABLET, FILM COATED ORAL at 14:57

## 2020-12-30 RX ADMIN — LORAZEPAM 0.5 MG: 0.5 TABLET ORAL at 19:11

## 2020-12-30 NOTE — CASE MANAGEMENT
Ely Ceron from [facility] central intake [phone] phoned this cm to inform Jose Boyd has been obtained for patient to come to [facility]  Ely Ceron was informed patient had mitts earlier and the mitts have been removed this am   Ely Ceron states patient can be transported to [facility] in the am  providing all goes well through the night  Treatment team informed

## 2020-12-30 NOTE — ASSESSMENT & PLAN NOTE
· Notable for new onset afib this hospitalization  · Will resume pt's diltiazem   · Pt likely to be transitioned to hospice at SNF, therefore would continue to hold off on anticoagulation based on risk vs  Benefits at this time  · Resume pt's  mg daily

## 2020-12-30 NOTE — PROGRESS NOTES
Progress Note Sai Brukett 1950, 65 y o  male MRN: [id number]    Unit/Bed#: -[facility] Encounter: [id number]    Primary Care Provider: Lidia Taylor MD   Date and time admitted to hospital: 12/10/2020 12:59 PM    * COVID-19 virus infection  Assessment & Plan  · Pt presented with generalized weakness and increased confusion, required mid flow and high-flow and was transferred to the ICU  He did not want intubation, and a conversation was held with his family regarding goals of care  They wish to make him comfort, he was transferred back out of the ICU to medical-surgical floor  · Pt continues to be alert, however tachypnic and coarse breath sounds noted   However upon improvement of his acute hypoxia, pt was transitioned back to level 3 currently and plan to d/c to SNF and possible hospice from there  · Continue PRN morphine and ativan for pt comfort, however will resume some of his cardiac medications as below   · Palliative care following, appreciate follow-up recommendations  · No longer strictly comfort care while hospitalized   · Note overnight on 12/28 did state that pt was lethargic and therefore comfort meds were changed to IV, however today pt's mentation is better, able to take in PO meds and food, however has been refusing some food here    Acute respiratory failure with hypoxia (HCC)  Assessment & Plan  · Supplemental O2   · Currently on 2-3 L saturating mid 90s  · Likely in setting of COVID infection and underlying COPD    Acute metabolic encephalopathy  Assessment & Plan  · Likely secondary to COVID-19 infection and severe hypoxia  · Patient with waxing and waning mentation   · Continues to be fairly alert during the day time hours, however does continue to be very confused    Diabetes mellitus [facility])  Assessment & Plan  Lab Results   Component Value Date    HGBA1C 6 5 (H) 12/14/2020       Recent Labs     12/30/20  0610 12/30/20  1044   POCGLU 131 157*       Blood Sugar Average: Last 72 hrs:  · (P) 144Previously controlled outpatient   · Has not been obtaining finger sticks here in setting of prior comfort care measures   · As pt has had poor PO intake, would not place back on insulin for now, continue to encourage PO intake  · He has not received steroids in some time, place on fingersticks BID    New onset a-fib [facility])  Assessment & Plan  · Notable for new onset afib this hospitalization  · Will resume pt's diltiazem   · Pt likely to be transitioned to hospice at SNF, therefore would continue to hold off on anticoagulation based on risk vs  Benefits at this time  · Resume pt's  mg daily     COPD (chronic obstructive pulmonary disease) ([facility] )  Assessment & Plan  · Not typically maintained on supplemental O2 as an outpatient   · Currently on 2-3 L NC here, however pt continues to rip off supplemental O2  · Continue PRN supplemental oxygen for comfort  · O2 saturations have improved, continue to be in the 90s on 2-3 L as patient tolerates    CVA (cerebral vascular accident) ([facility] )  Assessment & Plan  · Pt with recent hx of stroke a few months ago followed by [facility] thought to be traumatic in setting of falls at home   · Anticoagulation continues to be discontinued   · Will resume ASA as pt able to take in PO at this time    Essential hypertension  Assessment & Plan  · Noted to have hypotension on admission, can resume checking vitals per unit protocol   · Diltiazem to be resumed as above, with holding parameters       VTE Pharmacologic Prophylaxis:   Pharmacologic: Pharmacologic VTE Prophylaxis contraindicated due to Age fall risk  Mechanical VTE Prophylaxis in Place: Yes    Patient Centered Rounds: I have performed bedside rounds with nursing staff today      Discussions with Specialists or Other Care Team Provider:  Reviewed previous provider's notes discussed with case management primary RN    Education and Discussions with Family / Patient:  Discussed plan of care with patient denies any additional questions or concerns at this time    Time Spent for Care: 20 minutes  More than 50% of total time spent on counseling and coordination of care as described above  Current Length of Stay: 20 day(s)    Current Patient Status: Inpatient   Certification Statement: The patient will continue to require additional inpatient hospital stay due to Improvement in mental status, transition to short-term rehab    Discharge Plan / Estimated Discharge Date:  Next 24 hours pending short-term rehab availability      Code Status: Level 3 - DNAR and DNI      Subjective:   Appears comfortable in bed is still confused offers no complaints at this time    Objective:     Vitals:   Temp (24hrs), Av °F (36 7 °C), Min:97 1 °F (36 2 °C), Max:98 5 °F (36 9 °C)    Temp:  [97 1 °F (36 2 °C)-98 5 °F (36 9 °C)] 97 1 °F (36 2 °C)  HR:  [] 106  Resp:  [18-24] 24  BP: ()/(55-76) 114/76  SpO2:  [87 %-94 %] 92 %  Body mass index is 20 89 kg/m²  Input and Output Summary (last 24 hours): Intake/Output Summary (Last 24 hours) at 2020 1257  Last data filed at 2020 1204  Gross per 24 hour   Intake 920 ml   Output --   Net 920 ml       Physical Exam:     Physical Exam  Vitals signs and nursing note reviewed  Constitutional:       General: He is not in acute distress  Appearance: He is ill-appearing  Interventions: Nasal cannula in place  Cardiovascular:      Rate and Rhythm: Normal rate  Pulmonary:      Effort: Pulmonary effort is normal       Breath sounds: Rhonchi present  Abdominal:      General: Abdomen is flat  Skin:     General: Skin is warm  Neurological:      Mental Status: Mental status is at baseline  He is disoriented  Psychiatric:         Mood and Affect: Mood normal          Thought Content: Thought content normal          Judgment: Judgment normal          Additional Data:     Labs:               Invalid input(s): LABALBU        * I Have Reviewed All Lab Data Listed Above   * Additional Pertinent [facility] Tests Reviewed: [facility]    Recent Cultures (last 7 days):           Last 24 Hours Medication List:   Current Facility-Administered Medications   Medication Dose Route Frequency Provider Last Rate    aspirin  325 mg Oral Daily Shannon Lindsay PA-C      diltiazem  120 mg Oral Daily Shannon Lindsay PA-C      LORazepam  0 5 mg Oral Q4H ZULMA Bryan      morphine injection  2 mg Intravenous Q1H PRN La Sarah MD      Morphine Sulfate (Concentrate)  5 mg Oral Q2H PRN La Sarah MD      ondansetron  4 mg Intravenous Q6H PRN Wally Cohen PA-C      phenol  1 spray Mouth/Throat Q2H PRN Fernando Banks PA-C      saliva substitute  5 spray Mouth/Throat Q4H PRN Jennifer Donis PA-C          Today, Patient Was Seen By: ZULMA Bryan    ** Please Note: Dragon 360 Dictation voice to text software may have been used in the creation of this document   **

## 2020-12-30 NOTE — PROGRESS NOTES
Progress Note Ashley Costa 1950, 65 y o  male MRN: [id number]    Unit/Bed#: -[facility] Encounter: [id number]    Primary Care Provider: Liborio Dickinson MD   Date and time admitted to hospital: 12/10/2020 12:59 PM      DOS: 12/29/2020    * COVID-19 virus infection  Assessment & Plan  · Pt presented with generalized weakness and increased confusion, required mid flow and high-flow and was transferred to the ICU  He did not want intubation, and a conversation was held with his family regarding goals of care  They wish to make him comfort, he was transferred back out of the ICU to medical-surgical floor  · Pt continues to be alert, however tachypnic and coarse breath sounds noted  However upon improvement of his acute hypoxia, pt was transitioned back to level 3 currently and plan to d/c to SNF and possible hospice from there  · Continue PRN morphine and ativan for pt comfort, however will resume some of his cardiac medications as below   · Palliative care following, appreciate follow-up recommendations  · No longer strictly comfort care while hospitalized   · Note overnight on 12/28 did state that pt was lethargic and therefore comfort meds were changed to IV, however today pt's mentation is better, able to take in PO meds and food, however has been refusing some food here    New onset a-fib [facility])  Assessment & Plan  · Notable for new onset afib this hospitalization  · Will resume pt's diltiazem   · Pt likely to be transitioned to hospice at SNF, therefore would continue to hold off on anticoagulation based on risk vs  Benefits at this time  · Resume pt's  mg daily     Diabetes mellitus ([facility] )  Assessment & Plan  Lab Results   Component Value Date    HGBA1C 6 5 (H) 12/14/2020       No results for input(s): POCGLU in the last 72 hours      Blood Sugar Average: Last 72 hrs:  · Previously controlled outpatient   · Has not been obtaining finger sticks here in setting of prior comfort care measures   · As pt has had poor PO intake, would not place back on insulin for now, continue to encourage PO intake  · He has not received steroids in some time, place on fingersticks BID    Acute metabolic encephalopathy  Assessment & Plan  · Likely secondary to COVID-19 infection and severe hypoxia  · Patient with waxing and waning mentation   · Continues to be fairly alert during the day time hours, however does continue to be very confused    Acute respiratory failure with hypoxia (HCC)  Assessment & Plan  · Supplemental O2   · Currently on 2-3 L saturating mid 90s  · Likely in setting of COVID infection and underlying COPD    COPD (chronic obstructive pulmonary disease) ([facility] )  Assessment & Plan  · Not typically maintained on supplemental O2 as an outpatient   · Currently on 2-3 L NC here, however pt continues to rip off supplemental O2  · Continue PRN supplemental oxygen for comfort  · O2 saturations have improved, continue to be in the 90s on 2-3 L as patient tolerates    CVA (cerebral vascular accident) ([facility] )  Assessment & Plan  · Pt with recent hx of stroke a few months ago followed by [facility] thought to be traumatic in setting of falls at home   · Anticoagulation continues to be discontinued   · Will resume ASA as pt able to take in PO at this time    Essential hypertension  Assessment & Plan  · Noted to have hypotension on admission, can resume checking vitals per unit protocol   · Diltiazem to be resumed as above, with holding parameters    VTE Pharmacologic Prophylaxis:   Pharmacologic: Aspirin resumed  Mechanical VTE Prophylaxis in Place: Yes    Patient Centered Rounds: I have evaluated patient without nursing staff present due to Speaking to nurse outside patient's room secondary to COVID precautions    Discussions with Specialists or Other Care Team Provider:  Discussed with palliative care, RN, cm and reviewed previous notes    Education and Discussions with Family / Patient:  Discussed with patient at bedside as well as patient's niece Dariel Dennis over the phone    Time Spent for Care: 20 minutes  More than 50% of total time spent on counseling and coordination of care as described above  Current Length of Stay: 19 day(s)    Current Patient Status: Inpatient   Certification Statement: The patient will continue to require additional inpatient hospital stay due to Awaiting placement to short-term rehab    Discharge Plan:  Awaiting placement    Code Status: Level 3 - DNAR and DNI      Subjective:   Patient reports that he feels okay, denies any significant pain  Reports that he is thirsty  Nursing staff reports that he been refusing to eat much today  Objective:     Vitals:   No data recorded  Body mass index is 20 89 kg/m²  Input and Output Summary (last 24 hours): Intake/Output Summary (Last 24 hours) at 12/29/2020 1950  Last data filed at 12/29/2020 1828  Gross per 24 hour   Intake 270 ml   Output --   Net 270 ml       Physical Exam:     Physical Exam  Vitals signs reviewed  Constitutional:       General: He is not in acute distress  Appearance: He is ill-appearing  He is not toxic-appearing  Comments: Patient is in no acute distress lying in his hospital bed, mild tachypnea, appears uncomfortable with any type of movement  On 2-3 L saturating mid 90s   HENT:      Head: Normocephalic and atraumatic  Cardiovascular:      Rate and Rhythm: Tachycardia present  Rhythm irregular  Pulmonary:      Effort: Pulmonary effort is normal  No respiratory distress  Breath sounds: No wheezing or rales  Comments: Decreased breath sounds and coarse sounds noted throughout  Abdominal:      General: Bowel sounds are normal  There is no distension  Palpations: Abdomen is soft  Tenderness: There is no abdominal tenderness  Musculoskeletal:      Right lower leg: No edema  Left lower leg: No edema  Skin:     General: Skin is warm and dry  Findings: No erythema     Neurological: Mental Status: He is alert  He is disoriented  Additional Data:     Labs: Invalid input(s): LABALBU        * I Have Reviewed All Lab Data Listed Above  * Additional Pertinent Lab Tests Reviewed: No New Labs Available For Today    Imaging:    Imaging Reports Reviewed Today Include: None  Imaging Personally Reviewed by Myself Includes:  None     Recent Cultures (last 7 days):           Last 24 Hours Medication List:   Current Facility-Administered Medications   Medication Dose Route Frequency Provider Last Rate    [START ON 12/30/2020] aspirin  325 mg Oral Daily Shannon Lindsay PA-C      [START ON 12/30/2020] diltiazem  120 mg Oral Daily Shannon Lindsay PA-C      LORazepam  0 5 mg Oral Q4H Madie Harris MD      morphine injection  2 mg Intravenous Q1H PRN Madie Harris MD      Morphine Sulfate (Concentrate)  5 mg Oral Q2H PRN Madie Harris MD      ondansetron  4 mg Intravenous Q6H PRN Percy Ospina PA-C      phenol  1 spray Mouth/Throat Q2H PRN Aster Schmidt PA-C      saliva substitute  5 spray Mouth/Throat Q4H PRN Nicky Finley PA-C          Today, Patient Was Seen By: Allyssa Guillen PA-C    ** Please Note: Dictation voice to text software may have been used in the creation of this document   **

## 2020-12-30 NOTE — ASSESSMENT & PLAN NOTE
· Not typically maintained on supplemental O2 as an outpatient   · Currently on 2-3 L NC here, however pt continues to rip off supplemental O2  · Continue PRN supplemental oxygen for comfort  · O2 saturations have improved, continue to be in the 90s on 2-3 L as patient tolerates

## 2020-12-30 NOTE — ASSESSMENT & PLAN NOTE
· Noted to have hypotension on admission, can resume checking vitals per unit protocol   · Diltiazem to be resumed as above, with holding parameters

## 2020-12-30 NOTE — ASSESSMENT & PLAN NOTE
· Supplemental O2   · Currently on 2-3 L saturating mid 90s  · Likely in setting of COVID infection and underlying COPD

## 2020-12-30 NOTE — ASSESSMENT & PLAN NOTE
Lab Results   Component Value Date    HGBA1C 6 5 (H) 12/14/2020       No results for input(s): POCGLU in the last 72 hours      Blood Sugar Average: Last 72 hrs:  · Previously controlled outpatient   · Has not been obtaining finger sticks here in setting of prior comfort care measures   · As pt has had poor PO intake, would not place back on insulin for now, continue to encourage PO intake  · He has not received steroids in some time, place on fingersticks BID

## 2020-12-30 NOTE — ASSESSMENT & PLAN NOTE
· Likely secondary to COVID-19 infection and severe hypoxia  · Patient with waxing and waning mentation   · Continues to be fairly alert during the day time hours, however does continue to be very confused

## 2020-12-30 NOTE — ASSESSMENT & PLAN NOTE
Lab Results   Component Value Date    HGBA1C 6 5 (H) 12/14/2020       Recent Labs     12/30/20  0610 12/30/20  1044   POCGLU 131 157*       Blood Sugar Average: Last 72 hrs:  · (P) 144Previously controlled outpatient   · Has not been obtaining finger sticks here in setting of prior comfort care measures   · As pt has had poor PO intake, would not place back on insulin for now, continue to encourage PO intake  · He has not received steroids in some time, place on fingersticks BID

## 2020-12-30 NOTE — ASSESSMENT & PLAN NOTE
· Pt with recent hx of stroke a few months ago followed by [facility] thought to be traumatic in setting of falls at home   · Anticoagulation continues to be discontinued   · Will resume ASA as pt able to take in PO at this time

## 2020-12-31 VITALS
OXYGEN SATURATION: 91 % | TEMPERATURE: 97.6 F | SYSTOLIC BLOOD PRESSURE: 127 MMHG | WEIGHT: 121.69 LBS | HEART RATE: 106 BPM | RESPIRATION RATE: 20 BRPM | BODY MASS INDEX: 20.78 KG/M2 | DIASTOLIC BLOOD PRESSURE: 79 MMHG | HEIGHT: 64 IN

## 2020-12-31 LAB
GLUCOSE SERPL-MCNC: 119 MG/DL (ref 65–140)
GLUCOSE SERPL-MCNC: 119 MG/DL (ref 65–140)

## 2020-12-31 PROCEDURE — 99239 HOSP IP/OBS DSCHRG MGMT >30: CPT | Performed by: NURSE PRACTITIONER

## 2020-12-31 PROCEDURE — 82948 REAGENT STRIP/BLOOD GLUCOSE: CPT

## 2020-12-31 RX ORDER — MORPHINE SULFATE 100 MG/5ML
5 SOLUTION, CONCENTRATE ORAL EVERY 2 HOUR PRN
Qty: 5 ML | Refills: 0 | Status: SHIPPED | OUTPATIENT
Start: 2020-12-31 | End: 2021-01-03 | Stop reason: SDUPTHER

## 2020-12-31 RX ORDER — XYLITOL/YERBA SANTA
5 AEROSOL, SPRAY WITH PUMP (ML) MUCOUS MEMBRANE EVERY 4 HOURS PRN
Qty: 59 ML | Refills: 0 | Status: SHIPPED | OUTPATIENT
Start: 2020-12-31 | End: 2021-01-03

## 2020-12-31 RX ORDER — DILTIAZEM HYDROCHLORIDE 120 MG/1
120 CAPSULE, COATED, EXTENDED RELEASE ORAL DAILY
Qty: 30 CAPSULE | Refills: 0 | Status: SHIPPED | OUTPATIENT
Start: 2020-12-31 | End: 2021-01-03

## 2020-12-31 RX ORDER — MORPHINE SULFATE 100 MG/5ML
5 SOLUTION, CONCENTRATE ORAL EVERY 2 HOUR PRN
Qty: 20 ML | Refills: 0 | Status: SHIPPED | OUTPATIENT
Start: 2020-12-31 | End: 2020-12-31

## 2020-12-31 RX ORDER — LORAZEPAM 0.5 MG/1
0.5 TABLET ORAL EVERY 6 HOURS PRN
Qty: 30 TABLET | Refills: 0 | Status: SHIPPED | OUTPATIENT
Start: 2020-12-31 | End: 2021-01-03 | Stop reason: DRUGHIGH

## 2020-12-31 RX ADMIN — LORAZEPAM 0.5 MG: 0.5 TABLET ORAL at 00:00

## 2020-12-31 RX ADMIN — LORAZEPAM 0.5 MG: 0.5 TABLET ORAL at 12:51

## 2020-12-31 RX ADMIN — DILTIAZEM HYDROCHLORIDE 120 MG: 120 CAPSULE, COATED, EXTENDED RELEASE ORAL at 12:43

## 2020-12-31 RX ADMIN — ACETAMINOPHEN 650 MG: 325 TABLET, FILM COATED ORAL at 12:50

## 2020-12-31 NOTE — ASSESSMENT & PLAN NOTE
· Not typically maintained on supplemental O2 as an outpatient   · Currently on 2-3 L NC here, encourage compliance continue at short-term rehabContinue PRN supplemental oxygen for comfort  · O2 saturations have improved, continue to be in the 90s on 2-3 L as patient tolerates

## 2020-12-31 NOTE — DISCHARGE INSTR - AVS FIRST PAGE
Thank you for choosing [facility], please take all prescriptions as instructed, please make appropriate follow-up visits

## 2020-12-31 NOTE — INCIDENTAL FINDINGS
The following findings require follow up:  Radiographic finding   Finding: XR chest portable: There are areas of groundglass density in the both lungs with peripheral predominance with associated organizing consolidation in the right midlung and right perihilar region  ,  Commonly reported imaging features of Covid 19 pneumonia are , present    Superimposed bacterial pneumonia is not entirely excluded  ,  Other less likely differential consideration could include influenza pneumonia and organizing pneumonia , Correlate clinically, Follow-up in 6-8 weeks to demonstrate resolution,  , I personally discussed this study with Dr Jodi Royal, on 12/10/2020 at 3:00 PM , Workstation performed: [id number]   Follow up required: na   Follow up should be done within 0     Please notify the following clinician to assist with the follow up:   Dr DIAS

## 2020-12-31 NOTE — CASE MANAGEMENT
Per SLIM patient is cleared for discharge today  CM phoned Jovan Coleman at [facility] who states she will accept patient today after 12PM   CM phoned and spoke to Francisco at [facility] who scheduled patient's transport for 12:45PM today  Medical necessity is in the chart  [facility]  CM phoned to inform Arian of  IMM and IMM is in the chart and inform of information above  CM phoned raquel of transport time  Treatment team informed

## 2020-12-31 NOTE — TRANSPORTATION MEDICAL NECESSITY
Section I - General Information    Name of Patient: Georgia Rinaldi                 : 1950    Medicare #: [id number]  Transport Date: 20 (PCS is valid for round trips on this date and for all repetitive trips in the 60-day range as noted below )  Origin: [address]: [address]  Is the pt's stay covered under Medicare Part A (PPS/DRG)   [x]     Closest appropriate facility? If no, why is transport to more distant facility required? Yes  If hospice pt, is this transport related to pt's terminal illness? No       Section II - Medical Necessity Questionnaire  Ambulance transportation is medically necessary only if other means of transport are contraindicated or would be potentially harmful to the patient  To meet this requirement, the patient must either be "bed confined" or suffer from a condition such that transport by means other than ambulance is contraindicated by the patient's condition  The following questions must be answered by the medical professional signing below for this form to be valid:    1)  Describe the MEDICAL CONDITION (physical and/or mental) of this patient AT [address] [place] [address] that requires the patient to be transported in an ambulance and why transport by other means is contraindicated by the patient's condition:    COVID POSITIVE  3-4 LITERS OF OXYGEN  DECREASED STRENGTH, ENDURANCE, MOBILITY, COGNITION  IMPAIRED BALANCE AND DECREASED SAFETY AWARENESS  2) Is the patient "bed confined" as defined below? Yes  To be "be confined" the patient must satisfy all three of the following conditions: (1) unable to get up from bed without Assistance; AND (2) unable to ambulate; AND (3) unable to sit in a chair or wheelchair      3) Can this patient safely be transported by car or wheelchair van (i e , seated during transport without a medical attendant or monitoring)? No    4) In addition to completing questions 1-3 above, please check any of the following conditions that apply*:   *Note: supporting documentation for any boxes checked must be maintained in the patient's medical records  If hosp-hosp transfer, describe services needed at 2nd facility not available at 1st facility? Patient is confused  Moderate/severe pain on movement   Medical attendant required   Requires oxygen-unable to self administer  Special handling/isolation/infection control precautions required   Unable to tolerate seated position for time needed to transport       Section III - Signature of Physician or Healthcare Professional  I certify that the above information is true and correct based on my evaluation of this patient, and represent that the patient requires transport by ambulance and that other forms of transport are contraindicated  I understand that this information will be used by the Centers for Medicare and Medicaid Services (CMS) to support the determination of medical necessity for ambulance services, and I represent that I have personal knowledge of the patient's condition at time of transport  []  If this box is checked, I also certify that the patient is physically or mentally incapable of signing the ambulance service's claim and that the institution with which I am affiliated has furnished care, services, or assistance to the patient  My signature below is made on behalf of the patient pursuant to 42 CFR §424 36(b)(4)  In accordance with 42 CFR §424 37, the specific reason(s) that the patient is physically or mentally incapable of signing the claim form is as follows: N/A        Signature of Physician* or Healthcare Professional______________________________________________________________  Signature Date 12/31/20 (For scheduled repetitive transports, this form is not valid for transports performed more than 60 days after this date)    Printed Name & Credentials of Physician or Healthcare Professional (MD, DO, RN, etc )__MAHESH BARR__  *Form must be signed by patient's attending physician for scheduled, repetitive transports   For non-repetitive, unscheduled ambulance transports, if unable to obtain the signature of the attending physician, any of the following may sign (choose appropriate option below)  [] Physician Assistant []  Clinical Nurse Specialist []  Registered Nurse  []  Nurse Practitioner  [x] Discharge Planner

## 2020-12-31 NOTE — ASSESSMENT & PLAN NOTE
· Pt presented with generalized weakness and increased confusion, required mid flow and high-flow and was transferred to the ICU  He did not want intubation, and a conversation was held with his family regarding goals of care  They wish to make him comfort, he was transferred back out of the ICU to medical-surgical floor  · Pt continues to be alert, however tachypnic and coarse breath sounds noted   However upon improvement of his acute hypoxia, pt was transitioned back to level 3 currently and plan to d/c to SNF and possible hospice from there  · Continue PRN morphine and ativan for pt comfort, continue some of his cardiac medications as below   · Palliative care following, follow up outpatient  · No longer strictly comfort care while hospitalized, transitioning to short-term rehab

## 2020-12-31 NOTE — PLAN OF CARE
Problem: Potential for Falls  Goal: Patient will remain free of falls  Description: INTERVENTIONS:  - Assess patient frequently for physical needs  -  Identify cognitive and physical deficits and behaviors that affect risk of falls    -  [place] fall precautions as indicated by assessment   - Educate patient/family on patient safety including physical limitations  - Instruct patient to call for assistance with activity based on assessment  - Modify environment to reduce risk of injury  - Consider OT/PT consult to assist with strengthening/mobility  Outcome: Progressing     Problem: PAIN - ADULT  Goal: Verbalizes/displays adequate comfort level or baseline comfort level  Description: Interventions:  - Encourage patient to monitor pain and request assistance  - Assess pain using appropriate pain scale  - Administer analgesics based on type and severity of pain and evaluate response  - Implement non-pharmacological measures as appropriate and evaluate response  - Consider cultural and social influences on pain and pain management  - Notify physician/advanced practitioner if interventions unsuccessful or patient reports new pain  Outcome: Progressing     Problem: INFECTION - ADULT  Goal: Absence or prevention of progression during hospitalization  Description: INTERVENTIONS:  - Assess and monitor for signs and symptoms of infection  - Monitor lab/diagnostic results  - Monitor all insertion sites, i e  indwelling lines, tubes, and drains  - Monitor endotracheal if appropriate and nasal secretions for changes in amount and color  - [place] appropriate cooling/warming therapies per order  - Administer medications as ordered  - Instruct and encourage patient and family to use good hand hygiene technique  - Identify and instruct in appropriate isolation precautions for identified infection/condition  Outcome: Progressing  Goal: Absence of fever/infection during neutropenic period  Description: INTERVENTIONS:  - Monitor WBC    Outcome: Progressing     Problem: SAFETY ADULT  Goal: Patient will remain free of falls  Description: INTERVENTIONS:  - Assess patient frequently for physical needs  -  Identify cognitive and physical deficits and behaviors that affect risk of falls    -  [place] fall precautions as indicated by assessment   - Educate patient/family on patient safety including physical limitations  - Instruct patient to call for assistance with activity based on assessment  - Modify environment to reduce risk of injury  - Consider OT/PT consult to assist with strengthening/mobility  Outcome: Progressing  Goal: Maintain or return to baseline ADL function  Description: INTERVENTIONS:  -  Assess patient's ability to carry out ADLs; assess patient's baseline for ADL function and identify physical deficits which impact ability to perform ADLs (bathing, care of mouth/teeth, toileting, grooming, dressing, etc )  - Assess/evaluate cause of self-care deficits   - Assess range of motion  - Assess patient's mobility; develop plan if impaired  - Assess patient's need for assistive devices and provide as appropriate  - Encourage maximum independence but intervene and supervise when necessary  - Involve family in performance of ADLs  - Assess for home care needs following discharge   - Consider OT consult to assist with ADL evaluation and planning for discharge  - Provide patient education as appropriate  Outcome: Progressing  Goal: Maintain or return mobility status to optimal level  Description: INTERVENTIONS:  - Assess patient's baseline mobility status (ambulation, transfers, stairs, etc )    - Identify cognitive and physical deficits and behaviors that affect mobility  - Identify mobility aids required to assist with transfers and/or ambulation (gait belt, sit-to-stand, lift, walker, cane, etc )  - [place] fall precautions as indicated by assessment  - Record patient progress and toleration of activity level on Mobility SBAR; progress patient to next Phase/Stage  - Instruct patient to call for assistance with activity based on assessment  - Consider rehabilitation consult to assist with strengthening/weightbearing, etc   Outcome: Progressing     Problem: DISCHARGE PLANNING  Goal: Discharge to home or other facility with appropriate resources  Description: INTERVENTIONS:  - Identify barriers to discharge w/patient and caregiver  - Arrange for needed discharge resources and transportation as appropriate  - Identify discharge learning needs (meds, wound care, etc )  - Arrange for interpretive services to assist at discharge as needed  - Refer to Case Management Department for coordinating discharge planning if the patient needs post-hospital services based on physician/advanced practitioner order or complex needs related to functional status, cognitive ability, or social support system  Outcome: Progressing     Problem: Knowledge Deficit  Goal: Patient/family/caregiver demonstrates understanding of disease process, treatment plan, medications, and discharge instructions  Description: Complete learning assessment and assess knowledge base    Interventions:  - Provide teaching at level of understanding  - Provide teaching via preferred learning methods  Outcome: Progressing     Problem: Prexisting or High Potential for Compromised Skin Integrity  Goal: Skin integrity is maintained or improved  Description: INTERVENTIONS:  - Identify patients at risk for skin breakdown  - Assess and monitor skin integrity  - Assess and monitor nutrition and hydration status  - Monitor labs   - Assess for incontinence   - Turn and reposition patient  - Assist with mobility/ambulation  - Relieve pressure over bony prominences  - Avoid friction and shearing  - Provide appropriate hygiene as needed including keeping skin clean and dry  - Evaluate need for skin moisturizer/barrier cream  - Collaborate with interdisciplinary team   - Patient/family teaching  - Consider wound care consult   Outcome: Progressing     Problem: Nutrition/Hydration-ADULT  Goal: Nutrient/Hydration intake appropriate for improving, restoring or maintaining nutritional needs  Description: Monitor and assess patient's nutrition/hydration status for malnutrition  Collaborate with interdisciplinary team and initiate plan and interventions as ordered  Monitor patient's weight and dietary intake as ordered or per policy  Utilize nutrition screening tool and intervene as necessary  Determine patient's food preferences and provide high-protein, high-caloric foods as appropriate       INTERVENTIONS:  - Monitor oral intake, urinary output, labs, and treatment plans  - Assess nutrition and hydration status and recommend course of action  - Evaluate amount of meals eaten  - Assist patient with eating if necessary   - Allow adequate time for meals  - Recommend/ encourage appropriate diets, oral nutritional supplements, and vitamin/mineral supplements  - Order, calculate, and assess calorie counts as needed  - Recommend, monitor, and adjust tube feedings and TPN/PPN based on assessed needs  - Assess need for intravenous fluids  - Provide specific nutrition/hydration education as appropriate  - Include patient/family/caregiver in decisions related to nutrition  Outcome: Progressing     Problem: RESPIRATORY - ADULT  Goal: Achieves optimal ventilation and oxygenation  Description: INTERVENTIONS:  - Assess for changes in respiratory status  - Assess for changes in mentation and behavior  - Position to facilitate oxygenation and minimize respiratory effort  - Oxygen administered by appropriate delivery if ordered  - Initiate smoking cessation education as indicated  - Encourage broncho-pulmonary hygiene including cough, deep breathe, Incentive Spirometry  - Assess the need for suctioning and aspirate as needed  - Assess and instruct to report SOB or any respiratory difficulty  - Respiratory Therapy support as indicated  Outcome: Progressing     Problem: GENITOURINARY - ADULT  Goal: Maintains or returns to baseline urinary function  Description: INTERVENTIONS:  - Assess urinary function  - Encourage oral fluids to ensure adequate hydration if ordered  - Administer IV fluids as ordered to ensure adequate hydration  - Administer ordered medications as needed  - Offer frequent toileting  - Follow urinary retention protocol if ordered  Outcome: Progressing  Goal: Absence of urinary retention  Description: INTERVENTIONS:  - Assess patients ability to void and empty bladder  - Monitor I/O  - Bladder scan as needed  - Discuss with physician/AP medications to alleviate retention as needed  - Discuss catheterization for long term situations as appropriate  Outcome: Progressing     Problem: METABOLIC, FLUID AND ELECTROLYTES - ADULT  Goal: Electrolytes maintained within normal limits  Description: INTERVENTIONS:  - Monitor labs and assess patient for signs and symptoms of electrolyte imbalances  - Administer electrolyte replacement as ordered  - Monitor response to electrolyte replacements, including repeat lab results as appropriate  - Instruct patient on fluid and nutrition as appropriate  Outcome: Progressing  Goal: Fluid balance maintained  Description: INTERVENTIONS:  - Monitor labs   - Monitor I/O and WT  - Instruct patient on fluid and nutrition as appropriate  - Assess for signs & symptoms of volume excess or deficit  Outcome: Progressing  Goal: Glucose maintained within target range  Description: INTERVENTIONS:  - Monitor Blood Glucose as ordered  - Assess for signs and symptoms of hyperglycemia and hypoglycemia  - Administer ordered medications to maintain glucose within target range  - Assess nutritional intake and initiate nutrition service referral as needed  Outcome: Progressing     Problem: SKIN/TISSUE INTEGRITY - ADULT  Goal: Skin integrity remains intact  Description: INTERVENTIONS  - Identify patients at risk for skin breakdown  - Assess and monitor skin integrity  - Assess and monitor nutrition and hydration status  - Monitor labs (i e  albumin)  - Assess for incontinence   - Turn and reposition patient  - Assist with mobility/ambulation  - Relieve pressure over bony prominences  - Avoid friction and shearing  - Provide appropriate hygiene as needed including keeping skin clean and dry  - Evaluate need for skin moisturizer/barrier cream  - Collaborate with interdisciplinary team (i e  Nutrition, Rehabilitation, etc )   - Patient/family teaching  Outcome: Progressing     Problem: SAFETY,RESTRAINT: NV/NON-SELF DESTRUCTIVE BEHAVIOR  Goal: Remains free of harm/injury (restraint for non violent/non self-detsructive behavior)  Description: INTERVENTIONS:  - Instruct patient/family regarding restraint use   - Assess and monitor physiologic and psychological status   - Provide interventions and comfort measures to meet assessed patient needs   - Identify and implement measures to help patient regain control  - Assess readiness for release of restraint   Outcome: Progressing  Goal: Returns to optimal restraint-free functioning  Description: INTERVENTIONS:  - Assess the patient's behavior and symptoms that indicate continued need for restraint  - Identify and implement measures to help patient regain control  - Assess readiness for release of restraint   Outcome: Progressing

## 2020-12-31 NOTE — ASSESSMENT & PLAN NOTE
Lab Results   Component Value Date    HGBA1C 6 5 (H) 12/14/2020       Recent Labs     12/30/20  1044 12/30/20  1543 12/30/20 2032 12/31/20  0827   POCGLU 157* 152* 193* 119       Blood Sugar Average: Last 72 hrs:  · (P) 150 4Previously controlled outpatient   · Has not been obtaining finger sticks here in setting of prior comfort care measures   · Patient continues to have poor p o   Intake, hesitant to resume anti hyperglycemics simply diet control at this point pending improvement of diet

## 2020-12-31 NOTE — PROGRESS NOTES
Patient AAO x3 pleasant removing o2  reapplied by this nurse   Explained the importance to maintain O2 status  Patient was also ok to take a m meds, then when given declined would except tylenol and Ativan , When gotten and reenter room patient NC was    off again  Patient encouraged to let me reapply   Patient became irritable and started hitting me, Would not let me apply O2 and refused all meds by knocking them out of med cup onto floor  I again told patient how vital it was to keep 02 on to maintain live  Patient let me apply  O2,  [id number]

## 2020-12-31 NOTE — DISCHARGE INSTRUCTIONS
A-fib (Atrial Fibrillation)   WHAT YOU NEED TO KNOW:   A-fib may come and go, or it may be a long-term condition  A-fib can cause blood clots, stroke, or heart failure  These conditions may become life-threatening  It is important to treat and manage a-fib to help prevent a blood clot, stroke, or heart failure  DISCHARGE INSTRUCTIONS:   Call your local emergency number (911 in the [address] [facility]) if:   · You have any of the following signs of a heart attack:      ? Squeezing, pressure, or pain in your chest    ? You may  also have any of the following:     ? Discomfort or pain in your back, neck, jaw, stomach, or arm    ? Shortness of breath    ? Nausea or vomiting    ? Lightheadedness or a sudden cold sweat    · You have any of the following signs of a stroke:      ? Numbness or drooping on one side of your face     ? Weakness in an arm or leg    ? Confusion or difficulty speaking    ? Dizziness, a severe headache, or vision loss    Call your cardiologist if:   · Your arm or leg feels warm, tender, and painful  It may look swollen and red  · Your heart rate is more than 110 beats per minute  · You have new or worsening swelling in your legs, feet, ankles, or abdomen  · You are short of breath, even at rest      · You have questions or concerns about your condition or care  Medicines: You may need any of the following:  · Heart medicines  help control your heart rate or rhythm  You may need more than one medicine to treat your symptoms  · Blood thinners  help prevent blood clots  Clots can cause strokes, heart attacks, and death  The following are general safety guidelines to follow while you are taking a blood thinner:    ? Watch for bleeding and bruising while you take blood thinners  Watch for bleeding from your gums or nose  Watch for blood in your urine and bowel movements  Use a soft washcloth on your skin, and a soft toothbrush to brush your teeth  This can keep your skin and gums from bleeding   If you shave, use an electric shaver  Do not play contact sports  ? Tell your dentist and other healthcare providers that you take a blood thinner  Wear a bracelet or necklace that says you take this medicine  ? Do not start or stop any other medicines unless your healthcare provider tells you to  Many medicines cannot be used with blood thinners  ? Take your blood thinner exactly as prescribed by your healthcare provider  Do not skip does or take less than prescribed  Tell your provider right away if you forget to take your blood thinner, or if you take too much  ? Warfarin  is a blood thinner that you may need to take  The following are things you should be aware of if you take warfarin:     § Foods and medicines can affect the amount of warfarin in your blood  Do not make major changes to your diet while you take warfarin  Warfarin works best when you eat about the same amount of vitamin K every day  Vitamin K is found in green leafy vegetables and certain other foods  Ask for more information about what to eat when you are taking warfarin  § You will need to see your healthcare provider for follow-up visits when you are on warfarin  You will need regular blood tests  These tests are used to decide how much medicine you need  · Antiplatelets , such as aspirin, help prevent blood clots  Take your antiplatelet medicine exactly as directed  These medicines make it more likely for you to bleed or bruise  If you are told to take aspirin, do not take acetaminophen or ibuprofen instead  · Take your medicine as directed  Contact your healthcare provider if you think your medicine is not helping or if you have side effects  Tell him or her if you are allergic to any medicine  Keep a list of the medicines, vitamins, and herbs you take  Include the amounts, and when and why you take them  Bring the list or the pill bottles to follow-up visits   Carry your medicine list with you in case of an emergency  Manage A-fib:   · Know your target heart rate  Learn how to check your pulse and monitor your heart rate  · Know the risks if you choose to drink alcohol  Alcohol can make a-fib hard to manage  Ask your healthcare provider if it is safe for you to drink alcohol  A drink of alcohol is 12 ounces of beer, 5 ounces of wine, or 1½ ounces of liquor  · Do not smoke  Nicotine can cause heart damage and make it more difficult to manage your a-fib  Do not use e-cigarettes or smokeless tobacco in place of cigarettes or to help you quit  They still contain nicotine  Ask your healthcare provider for information if you currently smoke and need help quitting  · Eat heart-healthy foods  Heart healthy foods will help keep your cholesterol low  These include fruits, vegetables, whole-grain breads, low-fat dairy products, beans, lean meats, and fish  Replace butter and margarine with heart-healthy oils such as olive oil and canola oil  · Maintain a healthy weight  Ask your healthcare provider how much you should weigh  Ask him or her to help you create a safe weight loss plan if you are overweight  Even a small goal of a 10% weight loss can improve your heart health  · Get regular physical activity  Physical activity helps improve your heart health  Get at least 150 minutes of moderate aerobic physical activity each week  Your healthcare provider can help you create an activity plan  · Manage other health conditions  This includes high blood pressure or cholesterol, sleep apnea, diabetes, and other heart conditions  Take medicine as directed and follow your treatment plan  Follow up with your cardiologist as directed: You will need regular blood tests and monitoring  Write down your questions so you remember to ask them during your visits     © Copyright [address] Information is for End User's use only and may not be sold, redistributed or otherwise used for commercial purposes  All illustrations and images included in CareNotes® are the copyrighted property of A D A M , Inc  or [facility] Daryl Miranda   The above information is an  only  It is not intended as medical advice for individual conditions or treatments  Talk to your doctor, nurse or pharmacist before following any medical regimen to see if it is safe and effective for you  Acute Kidney Injury   WHAT YOU NEED TO KNOW:   Acute kidney injury (JOSHUA) is also called acute kidney failure, or acute renal failure  JOSHUA happens when your kidneys suddenly stop working correctly  Normally, the kidneys remove fluid, chemicals, and waste from your blood  These wastes are turned into urine by your kidneys  JOSHUA usually happens over hours or days  When you have JOSHUA, your kidneys do not remove the waste, chemicals, or extra fluid from your body  A normal amount of urine is not produced  JOSHUA is usually temporary, it can take days to months to recover  JOSHUA can also become a chronic kidney condition  DISCHARGE INSTRUCTIONS:   Call 911 if:   · You have sudden chest pain or trouble breathing  Seek care immediately if:   · Your symptoms get worse  Contact your healthcare provider if:   · Your symptoms return  · Your blood sugar or blood pressure level is not within the range your healthcare provider recommends  · You have questions or concerns about your condition or care  Nutrition:  Your healthcare provider may tell you to eat food low in sodium (salt), potassium, phosphorus, or protein  A dietitian can help you plan your meals  Drink liquids as directed: Your healthcare provider may recommend that you drink a certain amount of liquids  This will help your kidneys work better and decrease your risk for dehydration  Ask how much liquid to drink each day and which liquids are best for you    Prevent acute kidney injury:   · Manage other health conditions  such as diabetes, high blood pressure, or heart disease  These conditions increase your risk for acute kidney injury  Take your medicines for these conditions as directed  Also, monitor your blood sugar and blood pressure levels as directed  Contact your healthcare provider if your levels are not in the range he or she says it should be  · Talk to your healthcare provider before you take over-the-counter-medicine  NSAIDs, stomach medicine, or laxatives may harm your kidneys and increase your risk for acute kidney injury  If it is okay to take the medicine, follow the directions on the package  Do not take more than directed  · Tell healthcare providers you have had acute kidney injury  before you get contrast liquid for an x-ray or CT scan  Your healthcare provider may give you medicine to prevent kidney problems caused by the liquid  Follow up with your healthcare provider as directed: You will need to return for more tests to make sure your kidneys are working properly  You may also be referred to a kidney specialist  Write down your questions so you remember to ask them during your visits  © Copyright [address] Information is for End User's use only and may not be sold, redistributed or otherwise used for commercial purposes  All illustrations and images included in CareNotes® are the copyrighted property of A D A M , Inc  or [address] [place] [address]  The above information is an  only  It is not intended as medical advice for individual conditions or treatments  Talk to your doctor, nurse or pharmacist before following any medical regimen to see if it is safe and effective for you  Bacteremia   WHAT YOU NEED TO KNOW:   Bacteremia is bacteria in the blood  Bacteremia happens when germs from infections in your body travel to your blood  It can also be caused by a catheter or drain that is inserted into the body and left in place   Examples of catheters and drains include a port-a-cath, PICC line, dialysis catheter, abdominal drain, or a urinary catheter  DISCHARGE INSTRUCTIONS:   Call your local emergency number (911 or have someone else call) for any of the following:   · You have a seizure or lose consciousness  · You have trouble breathing  · You feel extremely weak and have a hard time moving  Seek care immediately if:   · Your symptoms, such as fever, get worse, even if you are taking medicine to treat the infection  · You stop urinating or urinate very little  Call your doctor if:   · You have questions or concerns about your condition or care  Medicines: You may need any of the following:  · Antibiotics  may be given to treat the infection  Do not stop taking your antibiotics when you feel better  Take all of your medicine until it is finished  This may prevent the infection from returning or getting worse  · Acetaminophen  decreases pain and fever  It is available without a doctor's order  Ask how much to take and how often to take it  Follow directions  Read the labels of all other medicines you are using to see if they also contain acetaminophen, or ask your doctor or pharmacist  Acetaminophen can cause liver damage if not taken correctly  Do not use more than 4 grams (4,000 milligrams) total of acetaminophen in one day  · NSAIDs , such as ibuprofen, help decrease swelling, pain, and fever  This medicine is available with or without a doctor's order  NSAIDs can cause stomach bleeding or kidney problems in certain people  If you take blood thinner medicine, always ask your healthcare provider if NSAIDs are safe for you  Always read the medicine label and follow directions  · Take your medicine as directed  Contact your healthcare provider if you think your medicine is not helping or if you have side effects  Tell him of her if you are allergic to any medicine  Keep a list of the medicines, vitamins, and herbs you take  Include the amounts, and when and why you take them   Bring the list or the pill bottles to follow-up visits  Carry your medicine list with you in case of an emergency  Prevent bacteremia:   · Wash your hands often  Wash your hands several times each day  Wash after you use the bathroom, change a child's diaper, and before you prepare or eat food  Use soap and water every time  Rub your soapy hands together, lacing your fingers  Wash the front and back of your hands, and in between your fingers  Use the fingers of one hand to scrub under the fingernails of the other hand  Wash for at least 20 seconds  Rinse with warm, running water for several seconds  Then dry your hands with a clean towel or paper towel  Use hand  that contains alcohol if soap and water are not available  Do not touch your eyes, nose, or mouth without washing your hands first          · Care for catheters and drains as directed  Wash your hands before and after you touch your catheter or drain  Follow directions for dressing changes and bathing  Watch for signs and symptoms of infection such as pus, fever, swelling, pain, or drainage  Report symptoms immediately to your healthcare provider  · Clean surfaces often  Clean doorknobs, countertops, cell phones, and other surfaces that are touched often  Use a disinfecting wipe, a single-use sponge, or a cloth you can wash and reuse  Use disinfecting  if you do not have wipes  You can create a disinfecting  by mixing 1 part bleach with 10 parts water  · Ask about vaccines you may need  Get all recommended vaccinations  The pneumonia and influenza vaccines may prevent lung infections that could cause bacteremia  Your healthcare provider can recommend other vaccines and tell you when to get them  Follow up with your doctor as directed: You may need to return for more blood tests  This will show if the antibiotics are working  Write down your questions so you remember to ask them during your visits    © [address] Information is for End User's use only and may not be sold, redistributed or otherwise used for commercial purposes  All illustrations and images included in CareNotes® are the copyrighted property of A D A M , Inc  or [place]  The above information is an  only  It is not intended as medical advice for individual conditions or treatments  Talk to your doctor, nurse or pharmacist before following any medical regimen to see if it is safe and effective for you  COPD (Chronic Obstructive Pulmonary Disease)   WHAT YOU NEED TO KNOW:   COPD (chronic obstructive pulmonary disease) can get worse quickly  Your healthcare providers will help you create a care plan to use at home  The plan will give directions on how to prevent or manage shortness of breath  Your family members or anyone who cares for you will also get directions to help you  WHILE YOU ARE HERE:   Informed consent  is a legal document that explains the tests, treatments, or procedures that you may need  Informed consent means you understand what will be done and can make decisions about what you want  You give your permission when you sign the consent form  You can have someone sign this form for you if you are not able to sign it  You have the right to understand your medical care in words you know  Before you sign the consent form, understand the risks and benefits of what will be done  Make sure all your questions are answered  Medicines:   · Bronchodilators  open your airway so you can breathe easier  They are most often given through an inhaler or nebulizer  · Mucolytics  thin and loosen the mucus in your lungs so you can cough it out  · Steroids  help decrease swelling in your airway  · Antibiotics  help treat or prevent a bacterial infection  · Blood thinners  help prevent blood clots  You may bleed or bruise more easily while you are taking this medicine      Monitoring:   · A pulse oximeter  is a device that measures the amount of oxygen in your blood  A cord with a clip or sticky strip is placed on your finger, ear, or toe  The other end of the cord is hooked to a machine  · An EKG  records your heart rhythm and how fast your heart beats  Tests:   · Lung function tests  are used to find how well your lungs work  Spirometry measures the airflow into and out of your lungs  A lung volume test measures the amount of air your lungs can hold  The amount of oxygen that gets into your blood when you breathe may also be measured  · Blood tests  check for infection and measure oxygen levels in your blood  Blood tests may also be used to find if you have alpha-1 antitrypsin deficiency  This is a genetic condition that can increase your risk for COPD  Blood tests may also show if you have any nutrition problems, such as low vitamin levels  · A mucus sample  is collected in a cup when you cough  The sample is tested for bacteria to find out if you need antibiotic treatment  · A chest x-ray  is done to check for other lung problems  Treatment:   · Pulmonary rehabilitation (rehab)  is a program run by specialists who help you learn to manage COPD  Examples include a pulmonologist (lung specialist), dietitian, and exercise therapist  [employer] may start rehab in the hospital and continue after you return home  You will get directions on how to continue rehab if this is recommended  · Oxygen  may help you breathe easier and feel more alert  You may receive oxygen through a ventilator or a CPAP machine  RISKS:   COPD raises your risk for diabetes, high blood pressure, and heart disease  Without treatment, COPD can become life-threatening  CARE AGREEMENT:   You have the right to help plan your care  Learn about your health condition and how it may be treated  Discuss treatment options with your healthcare providers to decide what care you want to receive  You always have the right to refuse treatment     © Copyright [address] Information is for [employer] use only and may not be sold, redistributed or otherwise used for commercial purposes  All illustrations and images included in CareNotes® are the copyrighted property of [employer] M , Inc  or [place]  The above information is an  only  It is not intended as medical advice for individual conditions or treatments  Talk to your doctor, nurse or pharmacist before following any medical regimen to see if it is safe and effective for you    Should I see or contact a doctor?   o If you have fever, cough, or trouble breathing and might have been exposed to COVID-19, call your doctor or nurse  - If you had close contact with a person who has the confirmed virus- close contact meaning within about 6 feet of the person  - If you live in, or traveled to, an area where lots of people have the virus  o Must call or contact the doctor, nurse, or clinic prior to coming in   Preventing the Spread  o Wash your hands; full 20 seconds  - If a sink is not available, gels with at least 60% alcohol work best  o Avoid touching your face  o Try to stay away from people who have any symptoms   o Avoid crowds; try to stay home as much as you can   - Social distancing  o Experts do NOT recommend wearing a face mask if you are not sick, unless you are caring for someone who has or might have the virus  o There not yet a vaccine to prevent COVID-19   o Hand dryers, rinsing out your nose with salt water, or taking antibiotics do NOT work and are simply rumors      Someone in my home has COVID-19,  what do I do?   o Keep sick person away from other(separate room and bathroom if possible)  o Use face masks when in the same room as sick person or if you are caring for sick person (this is the most important if the sick person cannot wear a mask)  o Wash hands with soap and water often  o Clean often   - Use gloves(disposable if available) when handling sick persons laundry, dishes, utensils, and trash  - Regularly clean high touch areas  (counters, bedside tables, doorknobs, computers, phones, and bathrooms)      Learn more at:   o [employer] [facility] and Prevention (CDC)  o World Health Organization (WHO)

## 2020-12-31 NOTE — DISCHARGE SUMMARY
Discharge- Karlee Dalyraman 1950, 65 y o  male MRN: [id number]    Unit/Bed#: -01 Encounter: [id number]    Primary Care Provider: Meng Santoyo MD   Date and time admitted to hospital: 12/10/2020 12:59 PM    * COVID-19 virus infection  Assessment & Plan  · Pt presented with generalized weakness and increased confusion, required mid flow and high-flow and was transferred to the ICU  He did not want intubation, and a conversation was held with his family regarding goals of care  They wish to make him comfort, he was transferred back out of the ICU to medical-surgical floor  · Pt continues to be alert, however tachypnic and coarse breath sounds noted  However upon improvement of his acute hypoxia, pt was transitioned back to level 3 currently and plan to d/c to SNF and possible hospice from there  · Continue PRN morphine and ativan for pt comfort, continue some of his cardiac medications as below   · Palliative care following, follow up outpatient  · No longer strictly comfort care while hospitalized, transitioning to short-term rehab    Acute respiratory failure with hypoxia ([address] )  Assessment & Plan  · Supplemental O2   · Currently on 2-3 L saturating mid 90s  · Likely in setting of COVID infection and underlying COPD    Acute metabolic encephalopathy  Assessment & Plan  · Likely secondary to COVID-19 infection and severe hypoxia  · Patient with waxing and waning mentation   · Continues to be fairly alert during the day time hours, however does continue to be very confused    Diabetes mellitus [facility])  Assessment & Plan  Lab Results   Component Value Date    HGBA1C 6 5 (H) 12/14/2020       Recent Labs     12/30/20  1044 12/30/20  1543 12/30/20  2032 12/31/20  0827   POCGLU 157* 152* 193* 119       Blood Sugar Average: Last 72 hrs:  · (P) 150 4Previously controlled outpatient   · Has not been obtaining finger sticks here in setting of prior comfort care measures   · Patient continues to have poor p o  Intake, hesitant to resume anti hyperglycemics simply diet control at this point pending improvement of diet    New onset a-fib [facility])  Assessment & Plan  · Notable for new onset afib this hospitalization  · Will resume pt's diltiazem   · Pt likely to be transitioned to hospice at SNF, therefore would continue to hold off on anticoagulation based on risk vs  Benefits at this time  · Resume pt's  mg daily     COPD (chronic obstructive pulmonary disease) ([facility] )  Assessment & Plan  · Not typically maintained on supplemental O2 as an outpatient   · Currently on 2-3 L NC here, encourage compliance continue at short-term rehabContinue PRN supplemental oxygen for comfort  · O2 saturations have improved, continue to be in the 90s on 2-3 L as patient tolerates    CVA [facility]cerebral vascular accident) [facility] )  Assessment & Plan  · Pt with recent hx of stroke a few months ago followed by [facility] thought to be traumatic in setting of falls at home   · Anticoagulation continues to be discontinued   · Will resume ASA as pt able to take in PO at this time    Essential hypertension  Assessment & Plan  · Noted to have hypotension on admission, can resume checking vitals per unit protocol   · Diltiazem to be resumed as above, with holding parameters         Discharging Physician / Practitioner: Jesu Stoner, [facility]   PCP: Christiano Brooks MD  Admission Date:   Admission Orders (From admission, onward)     Ordered        12/10/20 1418  Inpatient Admission  Once                   Discharge Date: 12/31/20    Resolved Problems  Date Reviewed: 12/29/2020          Resolved    Other hyperlipidemia 12/27/2020     Resolved by  ZULMA Bryan    JOSHUA (acute kidney injury) ([facility] ) 12/12/2020     Resolved by  ZULMA Bryan    Positive blood culture 12/28/2020     Resolved by  Jeane Chaney PA-C    Hyperglycemia 12/27/2020     Resolved by  Jesu Stoner, [facility] Stay:   [facility] CONSULT TO CARDIOLOGY  IP CONSULT TO NEUROLOGY  IP CONSULT TO PALLIATIVE CARE  IP CONSULT TO HOSPICE  · IP CONSULT TO NUTRITION SERVICES      Procedures Performed:   · Multiple chest x-rays  · CT head    Significant Findings / Test Results:   1  No acute intracranial hemorrhage, mass effect or edema  2   Advanced, chronic microangiopathy  3   Moderate-sized, chronic appearing infarction in the right posterior cerebral artery vascular territory   1  Advanced, chronic microangiopathy and chronic right occipital lobe infarction are stable  2   No acute intracranial hemorrhage, mass effect or edema  No change in diffuse groundglass opacity in the right lung with increased peripheral groundglass opacity in the left lung due to Covid 19 pneumonia   ·     Incidental Findings:   · None     Test Results Pending at Discharge (will require follow up): · None     Outpatient Tests Requested:  · None    Complications:  Initially rib pursuing hospice measures however patient continued to improve so patient was switched to level 3 from a level 4    Reason for Admission:    Chief Complaint   Patient presents with    Difficulty Walking     Pt arrives via EMS, sister called EMS because pt was not able to walk, BP 90/50, was recently in a rehab for stroke   Flu Symptoms     Hospital Course:     Kobi Calvin is a 65 y o  male patient who originally presented to the hospital on 12/10/2020 due to flu symptoms and generalized weakness  After further evaluation patient was found to have COVID-19  An initial discussion was held to pursue comfort measures only as patient was not interested in intubation or any aggressive procedures to prolong his life  However even with conservative measures and comfort measures only patient continued to improve  Please see above list of diagnoses and related plan for additional information       Condition at Discharge: stable     Discharge Day Visit / Exam:     Subjective:  Patient is a little grumpy but not aggressive  Is eager to get home  Vitals: Blood Pressure: 126/77 (12/31/20 0830)  Pulse: 94 (12/31/20 0700)  Temperature: 97 6 °F (36 4 °C) (12/31/20 0830)  Temp Source: Oral (12/30/20 2133)  Respirations: 20 (12/31/20 0830)  Height: 5' 4" (162 6 cm) (12/10/20 1750)  Weight - Scale: 55 2 kg (121 lb 11 1 oz) (12/22/20 0600)  SpO2: 94 % (12/31/20 0700)  Exam:   Physical Exam  Constitutional:       General: He is awake  He is not in acute distress  Appearance: He is cachectic  He is ill-appearing and toxic-appearing  Neck:      Musculoskeletal: Normal range of motion  Cardiovascular:      Rate and Rhythm: Normal rate  Pulses: Normal pulses  Pulmonary:      Effort: Pulmonary effort is normal    Abdominal:      General: Abdomen is flat  Neurological:      Mental Status: Mental status is at baseline  Motor: Weakness present  Psychiatric:         Mood and Affect: Affect is labile  Speech: He is noncommunicative  Speech is slurred  Behavior: Behavior is uncooperative  Thought Content: Thought content normal          Cognition and Memory: Cognition is impaired  Memory is impaired  Discussion with Family:  Discussed plan of care with Edi chase    Discharge instructions/Information to patient and family:   See after visit summary for information provided to patient and family  Provisions for Follow-Up Care:  See after visit summary for information related to follow-up care and any pertinent home health orders  Disposition:     Acute Rehab at [facility] to in    For Discharges to [facility] SNF:   · Not Applicable to this Patient - Not Applicable to this Patient    Planned Readmission:  No     Discharge Statement:  I spent 60 minutes discharging the patient  This time was spent on the day of discharge  I had direct contact with the patient on the day of discharge   Greater than 50% of the total time was spent examining patient, answering all patient questions, arranging and discussing plan of care with patient as well as directly providing post-discharge instructions  Additional time then spent on discharge activities  Discharge Medications:  See after visit summary for reconciled discharge medications provided to patient and family        ** Please Note: This note has been constructed using a voice recognition system **

## 2021-01-02 ENCOUNTER — TELEPHONE (OUTPATIENT)
Dept: OTHER | Facility: OTHER | Age: 71
End: 2021-01-02

## 2021-01-02 NOTE — TELEPHONE ENCOUNTER
Severiano Fender / : 44- / [facility] [address] / Contact: Marcelina Tiffany / [phone] / Patient is a new admission to [facility] - "recently COVID positive 12-, patient has a fever of 101 4, chills, /65, blood sugar 196 and HR of 60"  Please call Mavis back

## 2021-01-03 ENCOUNTER — NURSING HOME VISIT (OUTPATIENT)
Dept: GERIATRICS | Facility: OTHER | Age: 71
End: 2021-01-03
Payer: COMMERCIAL

## 2021-01-03 DIAGNOSIS — U07.1 COVID-19 VIRUS INFECTION: ICD-10-CM

## 2021-01-03 DIAGNOSIS — I48.91 NEW ONSET A-FIB (HCC): ICD-10-CM

## 2021-01-03 DIAGNOSIS — I10 ESSENTIAL HYPERTENSION: ICD-10-CM

## 2021-01-03 DIAGNOSIS — G62.89 OTHER POLYNEUROPATHY: ICD-10-CM

## 2021-01-03 DIAGNOSIS — J96.01 ACUTE RESPIRATORY FAILURE WITH HYPOXIA (HCC): Primary | ICD-10-CM

## 2021-01-03 DIAGNOSIS — G93.41 ACUTE METABOLIC ENCEPHALOPATHY: ICD-10-CM

## 2021-01-03 PROBLEM — J44.9 COPD (CHRONIC OBSTRUCTIVE PULMONARY DISEASE) (HCC): Status: RESOLVED | Noted: 2020-12-10 | Resolved: 2021-01-03

## 2021-01-03 PROCEDURE — 99305 1ST NF CARE MODERATE MDM 35: CPT | Performed by: FAMILY MEDICINE

## 2021-01-03 RX ORDER — LORAZEPAM 2 MG/ML
0.5 CONCENTRATE ORAL EVERY 6 HOURS
COMMUNITY

## 2021-01-03 RX ORDER — MORPHINE SULFATE 20 MG/5ML
5.2 SOLUTION ORAL EVERY 4 HOURS
COMMUNITY

## 2021-01-03 NOTE — PROGRESS NOTES
[facility]  [address]  Facility: [place] [address] Care/31    NAME: Santana Francisco  AGE: 65 y o  SEX: male    DATE OF ENCOUNTER: 1/3/2021    Code status:  No CPR    Assessment and Plan     COVID-19 virus infection  Doing poorly, comfort measures  Requires O2, on isolation now  Needs NH care, close monitoring  Acute respiratory failure with hypoxia (HCC)  With increased O2 need, scheduled morphine with prn dose  Poor prognosis  Needs NH care  Death appears inevitable  Diabetes mellitus ([facility] )    Lab Results   Component Value Date    HGBA1C 6 5 (H) 12/14/2020   no meds  No benefit of meds at end of life care  New onset a-fib (HCC)  HR at 100  Dc'd po meds due to pt unable to take po meds  Scheduled Ativan with prn Ativan  Essential hypertension  BP at lower side, DC's meds, will continue with monitoring  Peripheral neuropathy  His Gabapentin was decreased in dose initially when he came to [facility] and now is Dc'd due to pt not being able to take po med  Acute metabolic encephalopathy  Multifactorial with main cause being Covid-19, poor prognosis  Will continue with monitoring  All medications and routine orders were reviewed and updated as needed  Plan discussed with: nursing staff     Chief Complaint     Pt is not able to provide any info     History of Present Illness    [facility] as this note who was admitted to [facility] with Covid-19 on 12/10/2020  Pt initially admitted to ICU but due to his wishes of not being intubated was put on comfort measures  Pt is not at [facility] since 112/31/2020 to continue with care with comfort measures as goal and with transition to hospice care  Pt has been doing poorly since admission, and yesterday started to be more lethargic with increased temp of 101 4 with increase O2 need, and some respiratory distress   Pt was put on isolation considering risk of possibility of not being recovered from covid  Pt's niece has contacted facility and has talked to 3 of nursing staff and confirmed that she is aware of pt's poor condition  Per staff pt is restless and removes his O2 repeatedly  Per staff pt has not been able to have any po intake including his po meds, fluid or meals  O2 sat at 86% on o2 but he keeps moving his oxygen  HISTORY:  Past Medical History:   Diagnosis Date    Abnormality of gait and mobility     [facility] 12/11/2020    GERD (gastroesophageal reflux disease)     Hyperglycemia 12/14/2020    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     Other hyperlipidemia 8/17/2020    Other mechanical complication of other specified internal prosthetic devices, implants and grafts, sequela     Recurrent depressive disorder ([place] [address] )     Wedge compression fracture of t11-T12 vertebra, initial encounter for closed fracture ([place] [address] )     Wheezing      History reviewed  No pertinent family history  Social History     Socioeconomic History    Marital status: Single     Spouse name: None    Number of children: None    Years of education: None    Highest education level: None   Occupational History    None   Social Needs    Financial resource strain: None    Food insecurity     Worry: None     Inability: None    Transportation needs     Medical: None     Non-medical: None   Tobacco Use    Smoking status: Current Every Day Smoker   Substance and Sexual Activity    Alcohol use:  Yes    Drug use: Yes     Comment: pt states "I take whatever I get a hold of"    Sexual activity: None   Lifestyle    Physical activity     Days per week: None     Minutes per session: None    Stress: None   Relationships    Social connections     Talks on phone: None     Gets together: None     Attends Evangel[employer] service: None     Active member of club or organization: None     Attends meetings of clubs or organizations: None     Relationship status: None    Intimate partner violence     Fear of current or ex partner: None     Emotionally abused: None     Physically abused: None     Forced sexual activity: None   Other Topics Concern    None   Social History Narrative    None       Allergies:  No Known Allergies    Review of Systems     Review of Systems   Reason unable to perform ROS: lethargic        Medications and orders     All medications reviewed and updated in Nursing Home EMR  Objective     Vitals: wt:121Ibs          BP:109/65       CO:112    RR:33    Temp:97 2     BS:196    Physical Exam  Vitals signs and nursing note reviewed  Constitutional:       General: He is not in acute distress  Appearance: He is well-developed  He is ill-appearing  He is not toxic-appearing or diaphoretic  Comments: Moaning  HENT:      Head: Normocephalic and atraumatic  Right Ear: External ear normal       Left Ear: External ear normal       Nose: Nose normal  No congestion or rhinorrhea  Eyes:      General:         Right eye: No discharge  Left eye: No discharge  Comments: Eyes closed    Neck:      Musculoskeletal: No neck rigidity or muscular tenderness  Cardiovascular:      Rate and Rhythm: Normal rate  Heart sounds: Normal heart sounds  No murmur  No friction rub  No gallop  Comments: Irregular, irregular  Pulmonary:      Effort: No respiratory distress  Breath sounds: No stridor  No wheezing, rhonchi or rales  Comments: Coarse sounds generalized b/L lungs  Diminished lungs sounds  Chest:      Chest wall: No tenderness  Abdominal:      General: Abdomen is flat  Bowel sounds are normal  There is no distension  Palpations: Abdomen is soft  There is no mass  Tenderness: There is no abdominal tenderness  There is no guarding or rebound  Hernia: No hernia is present  Genitourinary:     Comments: deferred  Musculoskeletal:         General: No tenderness, deformity or signs of injury        Right lower leg: No edema  Left lower leg: No edema  Comments: In bed, moving R leg  Lymphadenopathy:      Cervical: No cervical adenopathy  Skin:     General: Skin is warm and dry  Coloration: Skin is not jaundiced or pale  Findings: No bruising, erythema, lesion or rash  Comments: Didn't examine sacral area   Neurological:      Comments: Limited with pt's lethargy  Psychiatric:         Behavior: Behavior normal          Pertinent Laboratory/Diagnostic Studies: The following labs/studies were reviewed please see chart or hospital paperwork for details  Ref Range & Units 12/22/20 0551   Ferritin 8 - 388 ng/mL 1,070High           Specimen Collected: 12/22/20 05:51   Last Resulted: 12/22/20 08:21        Ref Range & Units 12/22/20 0551    WBC 4 31 - 10 16 Thousand/uL 11  05High     RBC 3 88 - 5 62 Million/uL 3 64Low     Hemoglobin 12 0 - 17 0 g/dL 11 1Low     Hematocrit 36 5 - 49 3 % 32 5Low     MCV 82 - 98 fL 89    MCH 26 8 - 34 3 pg 30 5    MCHC 31 4 - 37 4 g/dL 34 2    RDW 11 6 - 15 1 % 17  1High     MPV 8 9 - 12 7 fL 11 2    Platelets 631 - 214 Thousands/uL 144Low     nRBC /100 WBCs 0          Ref Range & Units 12/22/20 0550   D-Dimer, Quant <0 50 ug/ml FEU 3  51High     Comment: Reference and upper limits to exclude DVT and PE are the same   Do not use to exclude if clinical symptoms are present  Ref Range & Units 12/22/20 0550   CRP <3 0 mg/L 184  8High           Specimen Collected: 12/22/20 05:50   Last Resulted: 12/22/20 07:51        Ref Range & Units 12/22/20 0550    Sodium 136 - 145 mmol/L 135Low     Potassium 3 5 - 5 3 mmol/L 4 9    Chloride 100 - 108 mmol/L 102    CO2 21 - 32 mmol/L 24    ANION GAP 4 - 13 mmol/L 9    BUN 5 - 25 mg/dL 46High     Creatinine 0 60 - 1 30 mg/dL 0 91    Comment: Standardized to IDMS reference method   Glucose 65 - 140 mg/dL 121    Comment: If the patient is fasting, the ADA then defines impaired fasting glucose as > 100 mg/dL and diabetes as > or equal to 123 mg/dL  Specimen collection should occur prior to Sulfasalazine administration due to the potential for falsely depressed results  Specimen collection should occur prior to Sulfapyridine administration due to the potential for falsely elevated results  Calcium 8 3 - 10 1 mg/dL 8 9    Corrected Calcium 8 3 - 10 1 mg/dL 10 7High     AST 5 - 45 U/L 50High     Comment: Specimen collection should occur prior to Sulfasalazine administration due to the potential for falsely depressed results  ALT 12 - 78 U/L 93High     Comment: Specimen collection should occur prior to Sulfasalazine administration due to the potential for falsely depressed results  Alkaline Phosphatase 46 - 116 U/L 98    Total Protein 6 4 - 8 2 g/dL 5 8Low     Albumin 3 5 - 5 0 g/dL 1 8Low     Total Bilirubin 0 20 - 1 00 mg/dL 1  20High     Comment: Use of this assay is not recommended for patients undergoing treatment with eltrombopag due to the potential for falsely elevated results  eGFR ml/min/1 73sq m 84      Ref Range & Units 12/22/20 0550    Phosphorus 2 3 - 4 1 mg/dL 3 7          Specimen Collected: 12/22/20 05:50   Last Resulted: 12/22/20 07:51        Ref Range & Units 12/22/20 0550    Magnesium 1 6 - 2 6 mg/dL 2 0          Specimen Collected: 12/22/20 05:50   Last Resulted: 12/22/20 07:51        Ref Range & Units 12/19/20 1349    Procalcitonin <=0 25 ng/ml <0 05      Ref Range & Units 12/16/20 0543    T3, Free 2 30 - 4 20 pg/mL 0 68Low       Ref Range & Units 12/14/20 1241    Hemoglobin A1C Normal 3 8-5 6%; PreDiabetic 5 7-6 4%;  Diabetic >=6 5%; Glycemic control for adults with diabetes <7 0% % 6 5High     EAG mg/dl 140      Component 12/10/20 1649   ABO Grouping O    Rh Factor Positive    12/10:  Ref Range & Units     SARS-CoV-2 Negative PositiveAbnormal     INFLUENZA A PCR Negative Negative    INFLUENZA B PCR Negative Negative    RSV PCR Negative Negative       Narrative                  Kerri Holloway MD  1/3/2021 4:56 PM

## 2021-01-03 NOTE — ASSESSMENT & PLAN NOTE
With increased O2 need, scheduled morphine with prn dose  Poor prognosis  Needs NH care  Death appears inevitable

## 2021-01-03 NOTE — ASSESSMENT & PLAN NOTE
Lab Results   Component Value Date    HGBA1C 6 5 (H) 12/14/2020   no meds  No benefit of meds at end of life care

## 2021-01-03 NOTE — ASSESSMENT & PLAN NOTE
His Gabapentin was decreased in dose initially when he came to [facility] and now is Dc'd due to pt not being able to take po med

## 2021-01-06 NOTE — UTILIZATION REVIEW
ZULMA Bryan   Nurse Practitioner   Hospitalist   Discharge Summary       Attested   Date of Service:  12/31/2020 10:50 AM               Attested        Attestation signed by Elmo Santos MD at 1/3/2021 4:18 PM   Co-Sign Only (Services provided by AP)     I am only signing the AP's documentation and services provided  I was available by phone, if needed, for consultation  I was not involved in patient's care and did not play any role in drafting this A&P  I have not seen or examined the patient myself  Services were independently provided by the Florence Brady MD           Show:Clear all  [x]Manual[x]Template[x]Copied    Added by:  [x]Lona Barbosa    []Nataliya for details     Discharge- Kel Bras 1950, 65 y o  male MRN: [id number]     Unit/Bed#: -01 Encounter: [id number]     Primary Care Provider: Christiano Brooks MD   Date and time admitted to hospital: 12/10/2020 12:59 PM     * COVID-19 virus infection  Assessment & Plan  · Pt presented with generalized weakness and increased confusion, required mid flow and high-flow and was transferred to the ICU  He did not want intubation, and a conversation was held with his family regarding goals of care  They wish to make him comfort, he was transferred back out of the ICU to medical-surgical floor  ? Pt continues to be alert, however tachypnic and coarse breath sounds noted  However upon improvement of his acute hypoxia, pt was transitioned back to level 3 currently and plan to d/c to SNF and possible hospice from there  ? Continue PRN morphine and ativan for pt comfort, continue some of his cardiac medications as below   ? Palliative care following, follow up outpatient  ?  No longer strictly comfort care while hospitalized, transitioning to short-term rehab     Acute respiratory failure with hypoxia (HCC)  Assessment & Plan  · Supplemental O2   · Currently on 2-3 L saturating mid 90s  · Likely in setting of COVID infection and underlying COPD     Acute metabolic encephalopathy  Assessment & Plan  · Likely secondary to COVID-19 infection and severe hypoxia  · Patient with waxing and waning mentation   · Continues to be fairly alert during the day time hours, however does continue to be very confused     Diabetes mellitus [facility])  Assessment & Plan        Lab Results   Component Value Date     HGBA1C 6 5 (H) 12/14/2020                Recent Labs     12/30/20  1044 12/30/20  1543 12/30/20  2032 12/31/20  0827   POCGLU 157* 152* 193* 119         Blood Sugar Average: Last 72 hrs:  · (P) 150 4Previously controlled outpatient   · Has not been obtaining finger sticks here in setting of prior comfort care measures   · Patient continues to have poor p o   Intake, hesitant to resume anti hyperglycemics simply diet control at this point pending improvement of diet     New onset a-fib [facility])  Assessment & Plan  · Notable for new onset afib this hospitalization  · Will resume pt's diltiazem   · Pt likely to be transitioned to hospice at SNF, therefore would continue to hold off on anticoagulation based on risk vs  Benefits at this time  · Resume pt's  mg daily      COPD (chronic obstructive pulmonary disease) ([facility] )  Assessment & Plan  · Not typically maintained on supplemental O2 as an outpatient   · Currently on 2-3 L NC here, encourage compliance continue at short-term rehabContinue PRN supplemental oxygen for comfort  · O2 saturations have improved, continue to be in the 90s on 2-3 L as patient tolerates     CVA (cerebral vascular accident) ([facility] )  Assessment & Plan  · Pt with recent hx of stroke a few months ago followed by [facility] thought to be traumatic in setting of falls at home   · Anticoagulation continues to be discontinued   · Will resume ASA as pt able to take in PO at this time     Essential hypertension  Assessment & Plan  · Noted to have hypotension on admission, can resume checking vitals per unit protocol   · Diltiazem to be resumed as above, with holding parameters           Discharging Physician / Practitioner: Stephany Castaneda, [address]  PCP: Erminio Lanes, MD  Admission Date:   Admission Orders (From admission, onward)              Ordered          12/10/20 1418   Inpatient Admission  Once                       Discharge Date: 12/31/20              Resolved Problems  Date Reviewed: 12/29/2020           Resolved     Other hyperlipidemia 12/27/2020       Resolved by  ZULMA Mackenzie     JOSHUA (acute kidney injury) ([facility] ) 12/12/2020       Resolved by  ZULMA Mackenzie     Positive blood culture 12/28/2020       Resolved by  Gopal Rubio PA-C     Hyperglycemia 12/27/2020       Resolved by  ZULMA Mackenzie             Consultations During Hospital Stay:  ·  IP CONSULT TO PULMONOLOGY  · IP CONSULT TO CARDIOLOGY  · IP CONSULT TO NEUROLOGY  · IP CONSULT TO PALLIATIVE CARE  · IP CONSULT TO HOSPICE  · IP CONSULT TO NUTRITION SERVICES        Procedures Performed:   · Multiple chest x-rays  · CT head     Significant Findings / Test Results:   1   No acute intracranial hemorrhage, mass effect or edema  2   Advanced, chronic microangiopathy  3   Moderate-sized, chronic appearing infarction in the right posterior cerebral artery vascular territory   1   Advanced, chronic microangiopathy and chronic right occipital lobe infarction are stable  2   No acute intracranial hemorrhage, mass effect or edema  No change in diffuse groundglass opacity in the right lung with increased peripheral groundglass opacity in the left lung due to Covid 19 pneumonia   ·       Incidental Findings:   · None      Test Results Pending at Discharge (will require follow up):    · None     Outpatient Tests Requested:  · None     Complications:  Initially rib pursuing hospice measures however patient continued to improve so patient was switched to level 3 from a level 4     Reason for Admission:         Chief Complaint   Patient presents with    Difficulty Walking     Pt arrives via EMS, sister called EMS because pt was not able to walk, BP 90/50, was recently in a rehab for stroke   Flu Symptoms      Hospital Course:      Ruperto Chavarria is a 65 y o  male patient who originally presented to the hospital on 12/10/2020 due to flu symptoms and generalized weakness  After further evaluation patient was found to have COVID-19  An initial discussion was held to pursue comfort measures only as patient was not interested in intubation or any aggressive procedures to prolong his life  However even with conservative measures and comfort measures only patient continued to improve      Please see above list of diagnoses and related plan for additional information       Condition at Discharge: stable      Discharge Day Visit / Exam:      Subjective:  Patient is a little grumpy but not aggressive  Is eager to get home  Vitals: Blood Pressure: 126/77 (12/31/20 0830)  Pulse: 94 (12/31/20 0700)  Temperature: 97 6 °F (36 4 °C) (12/31/20 0830)  Temp Source: Oral (12/30/20 2133)  Respirations: 20 (12/31/20 0830)  Height: 5' 4" (162 6 cm) (12/10/20 1750)  Weight - Scale: 55 2 kg (121 lb 11 1 oz) (12/22/20 0600)  SpO2: 94 % (12/31/20 0700)  Exam:   Physical Exam  Constitutional:       General: He is awake  He is not in acute distress  Appearance: He is cachectic  He is ill-appearing and toxic-appearing  Neck:      Musculoskeletal: Normal range of motion  Cardiovascular:      Rate and Rhythm: Normal rate  Pulses: Normal pulses  Pulmonary:      Effort: Pulmonary effort is normal    Abdominal:      General: Abdomen is flat  Neurological:      Mental Status: Mental status is at baseline  Motor: Weakness present  Psychiatric:         Mood and Affect: Affect is labile  Speech: He is noncommunicative  Speech is slurred  Behavior: Behavior is uncooperative  Thought Content:  Thought content normal          Cognition and Memory: Cognition is impaired  Memory is impaired             Discussion with Family:  Discussed plan of care with Edi chase     Discharge instructions/Information to patient and family:   See after visit summary for information provided to patient and family        Provisions for Follow-Up Care:  See after visit summary for information related to follow-up care and any pertinent home health orders        Disposition:      Acute Rehab at [facility] to in     For Discharges to [facility] SNF:   · Not Applicable to this Patient - Not Applicable to this Patient     Planned Readmission:  No     Discharge Statement:  I spent 60 minutes discharging the patient  This time was spent on the day of discharge  I had direct contact with the patient on the day of discharge  Greater than 50% of the total time was spent examining patient, answering all patient questions, arranging and discussing plan of care with patient as well as directly providing post-discharge instructions    Additional time then spent on discharge activities      Discharge Medications:  See after visit summary for reconciled discharge medications provided to patient and family        ** Please Note: This note has been constructed using a voice recognition system **                                    Cosigned by: Yocasta Lees MD at 1/3/2021  4:18 PM   Revision History

## 2021-11-23 NOTE — ASSESSMENT & PLAN NOTE
----- Message from Marybeth Garcia RN sent at 11/23/2021 12:46 PM CST -----  Regarding: Unable to reach  Patient is not responding to Care Manager outreaches. Please assist with Care Management outreach by calling patient and encourage Care Management engagement. Thank you.     · Not in acute exacerbation    · Bronchodilators as needed

## 2022-04-04 NOTE — ASSESSMENT & PLAN NOTE
PRE-SEDATION ASSESSMENT    CONSENT  Risks, benefits, and alternatives have been discussed with the patient/patient representative, and patient/patient representative agrees to proceed: Yes    MEDICAL HISTORY  Significant medical/surgical history: Yes  Past Complications with Sedation/Anesthesia: No  Significant Family History: No  Smoking History: No  Alcohol/Drug abuse: No  Possible Pregnancy (LMP): No  Cardiac History: No  Respiratory History: No    PHYSICAL EXAM  History and Physical Reviewed: H&P completed today  Airway Risk History: No previous history  Airway Anatomy : Class II  Heart : Normal  Lungs : Normal  LOC/Mental Status : Normal    OTHER FINDINGS  Reviewed current medications and allergies: Yes  Pertinent lab/diagnostic test reviewed: Yes    SEDATION RISK ASSESSMENT  Risk Status ASA: Class II - Normal patient with mild systemic disease  Plan for Sedation: Moderate Sedation  Indications for Procedure/Pre-Procedure Diagnosis and Planned Procedure: Screening for colorectal cancer  EKG Monitoring: Yes    NARRATIVE FINDINGS      · Pt presented with generalized weakness and increased confusion, slowly improving   · COVID (+)   · Initially requiring 5 L NC, now increased to 15 L MFNC, saturating 91% on 15 L  · Pulmonary following, appreciate recommendations   · Moderate pathway,   · Completed 5 days of IV Remdesivir on 12/14   · Continue IV Decadron (day 6/10)  · S/p covalescent plasma administration   · Procalcitonin negative x2, IV antibiotics discontinued  · Vit C, Zinc, Vit D supplementation  · Lipitor 40 mg daily   · Pepcid 20 mg daily  · Switch to PO Eliquis 5 mg BID in setting of new onset a  Fib after discussion with neurology and neurosurgery  · D-dimer is < 2 5, did have increase to 2 3 today  · Repeat D-dimer in the AM  · Inflammatory markers improving  · Consideration for Actemra   · Discussed with patient regarding code status, he is currently alert and oriented and is requesting to be down graded to DNR/DNI status   Depending on clinical course, if worsening hypoxia would consider palliative care consultation for goals of care with patient

## 2024-05-04 NOTE — SPEECH THERAPY NOTE
Patient presented with acute on chronic urinary retention, Sumner catheter in place  -History of bladder neck contracture, status post cystoscopy transurethral resection bladder neck contracture bladder tumor evacuation of clots on 4/11/2024.   -Was discharged with Sumner in place on previous admission.  - now with SPT in place  -Further management as noted above.   Speech-Language Pathology Bedside Swallow Evaluation        Patient Name: Sai Ashton    QCTAW'O Date: 12/30/2020     Problem List  Patient Active Problem List   Diagnosis    Other insomnia    Essential hypertension    Slow transit constipation    Peripheral neuropathy    DVT prophylaxis    COVID-19 virus infection    [facility]    COPD (chronic obstructive pulmonary disease) [facility]    Acute respiratory failure with hypoxia ([facility] )    Acute metabolic encephalopathy    New onset a-fib ([facility] )    Right Subdural hematoma ([facility] ) 09/2020    Diabetes mellitus ([facility] )       Past Medical History  Past Medical History:   Diagnosis Date    Abnormality of gait and mobility     JOSHUA (acute kidney injury) ([facility] ) 12/11/2020    GERD (gastroesophageal reflux disease)     Hyperglycemia 12/14/2020    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     Other hyperlipidemia 8/17/2020    Other mechanical complication of other specified internal prosthetic devices, implants and grafts, sequela     Recurrent depressive disorder ([facility] )     Wedge compression fracture of t11-T12 vertebra, initial encounter for closed fracture ([place] [id number] )     Wheezing        Past Surgical History  No past surgical history on file  Current Medical Status  Pt is a 65 y o  male who presented to [facility] with generalized weakness and increased confusion  He is COVID positive  He was on mid-flow and then high-flow and refused intubation  He was initially made comfort care and has since improved and is now a level 3 code status  ST consult was initially placed 12/15 due to reported coughing with food however upon my arrival RN and PCA deny any dysphagia/difficulty with food or liquids  New consult placed today as RN reporting coughing with liquids       Past medical history:   Please see H&P for details    Special Studies:  12/21 CXR: No change in diffuse groundglass opacity in the right lung with increased peripheral groundglass opacity in the left lung due to Covid 19 pneumonia      12/19 CXR: No change in right greater than left ground glass opacity due to Covid 19 pneumonia  12/18 CT head: 1  Advanced, chronic microangiopathy and chronic right occipital lobe infarction are stable  2   No acute intracranial hemorrhage, mass effect or edema  12/17 CXR:  No change in right greater than left ground glass opacity due to Covid-19 pneumonia  12/14 CT head: 1  No acute intracranial hemorrhage, mass effect or edema  2   Advanced, chronic microangiopathy  3   Moderate-sized, chronic appearing infarction in the right posterior cerebral artery vascular territory  12/10 CXR: There are areas of groundglass density in the both lungs with peripheral predominance with associated organizing consolidation in the right midlung and right perihilar region  ,  Commonly reported imaging features of Covid 19 pneumonia are present  Superimposed bacterial pneumonia is not entirely excluded  Other less likely differential consideration could include influenza pneumonia and organizing pneumonia  Correlate clinically   Follow-up in 6-8 weeks to demonstrate resolution    Swallow Information   Current Risks for Dysphagia & Aspiration: change in respiratory status and reported new dysphagia     Current Symptoms/Concerns: coughing with po and change in respiratory status    Current Diet: regular diet and thin liquids      Baseline Diet: regular diet and thin liquids      Baseline Assessment   Behavior/Cognition: alert and decreased attention    Speech/Language Status: able to participate in basic conversation and able to follow commands    Patient Positioning: upright in bed      Swallow Mechanism Exam   Facial: symmetrical  Labial: WFL  Lingual: bilateral decreased strength (mild)  Velum: unable to visualize  Mandible: adequate ROM  Dentition: full dentures- placed with fixodent  Vocal quality:clear/adequate Volitional Cough: weak   Resp: 3L NC- congested    Consistencies Assessed and Performance   Consistencies Administered: thin liquids, nectar thick, honey thick, puree, soft solids, hard solids and mixed consistency  Specific materials administered included: thin liquids, thick liquids, pudding, stuffed shells, broccoli, chicken noodle soup    Oral Stage:  Mastication was prolonged but adequate with the materials administered today  Bolus formation and transfer were functional with no significant oral residue noted  No overt s/s reduced oral control  Pharyngeal Stage:  Swallowing initiation appeared prompt to minimally delayed  Laryngeal rise was palpated and judged to be within functional limits  Intermittent cough was observed throughout trials and at baseline however this did not appear to be directly related to swallow function  No overt coughing, throat clearing, change in vocal quality or respiratory status noted today  SPO2 88 at baseline and fluctuated between 87-90 throughout  He was also seen with large meds whole with thin liquids- no sxs of aspiration observed  Esophageal Concerns: none reported    Summary   Pts oropharyngeal swallow function appears generally WFL at this time with the materials administered today  Patient does present with coughing during intake however this was noted at baseline and occasional across multiple textures- does not appear to be directly related to swallow function  Cannot r/o silent aspiration however patient unable to participate in VBS at this time given COVID + status      Recommendations: regular diet and thin liquids     Recommended Form of Meds: as tolerated     Aspiration precautions and compensatory swallowing strategies: upright posture, only feed when fully alert, slow rate of feeding, small bites/sips and alternating bites and sips    Results Reviewed with: patient, RN and CRNP     Dysphagia Goals: pt will tolerate regular with thin  without s/s of aspiration x2-3      Plan  Will f/u 1-2x if patient remains hospitalized ( he is currently pending discharge to [facility])    JULIETTE Santiago , [id number] [facility]  Speech Language Pathologist   Available via [address] [place] [address] [address][id number]  [place] #[id number]

## 2025-06-23 NOTE — ASSESSMENT & PLAN NOTE
Spoke with pt, informed him that he was not diagnosed with REZA. Informed him and provided explanation of nocturnal hypoxemia. Provided pulmonologist info and phone number. Informed him of need for a wedge or slumber bump. Pt odom not know how to access [employer] to order one. He stated there is a [employer] company is area, he will look there. I provided him the number to [employer].    ----- Message from Vazquez Aiken CNP sent at 6/23/2025  9:30 AM CDT -----  6/16/2025 PSG total sleep time 350.8 minutes AHI 4.4, central apnea index 4.1, REM AHI 0.8, supine AHI 4.4 nonsupine AHI 0 oxygen jarad 88% time under 88% 9.4 minutes  No obstructive sleep apnea, central sleep apnea, upper airway resistance or periodic leg movements.  Patient has a diagnosis of nocturnal hypoxemia.  Recommend the patient avoid supine sleeping and follow-up with pulmonary will place referral for Dr. valenzuela.  Team to call and update patient on the following.     · Continue Lipitor 40 mg daily